# Patient Record
Sex: MALE | Race: WHITE | NOT HISPANIC OR LATINO | URBAN - METROPOLITAN AREA
[De-identification: names, ages, dates, MRNs, and addresses within clinical notes are randomized per-mention and may not be internally consistent; named-entity substitution may affect disease eponyms.]

---

## 2017-02-07 ENCOUNTER — OUTPATIENT (OUTPATIENT)
Dept: OUTPATIENT SERVICES | Facility: HOSPITAL | Age: 62
LOS: 1 days | End: 2017-02-07

## 2017-02-17 DIAGNOSIS — Z01.20 ENCOUNTER FOR DENTAL EXAMINATION AND CLEANING WITHOUT ABNORMAL FINDINGS: ICD-10-CM

## 2017-03-23 ENCOUNTER — NON-APPOINTMENT (OUTPATIENT)
Age: 62
End: 2017-03-23

## 2017-03-23 ENCOUNTER — APPOINTMENT (OUTPATIENT)
Dept: CARDIOLOGY | Facility: CLINIC | Age: 62
End: 2017-03-23

## 2017-03-23 VITALS — HEART RATE: 77 BPM | SYSTOLIC BLOOD PRESSURE: 106 MMHG | DIASTOLIC BLOOD PRESSURE: 70 MMHG | OXYGEN SATURATION: 97 %

## 2017-08-08 ENCOUNTER — OUTPATIENT (OUTPATIENT)
Dept: OUTPATIENT SERVICES | Facility: HOSPITAL | Age: 62
LOS: 1 days | End: 2017-08-08

## 2017-08-16 DIAGNOSIS — Z01.20 ENCOUNTER FOR DENTAL EXAMINATION AND CLEANING WITHOUT ABNORMAL FINDINGS: ICD-10-CM

## 2017-09-21 ENCOUNTER — NON-APPOINTMENT (OUTPATIENT)
Age: 62
End: 2017-09-21

## 2017-09-21 ENCOUNTER — APPOINTMENT (OUTPATIENT)
Dept: CARDIOLOGY | Facility: CLINIC | Age: 62
End: 2017-09-21
Payer: MEDICARE

## 2017-09-21 VITALS — OXYGEN SATURATION: 99 % | HEART RATE: 61 BPM | DIASTOLIC BLOOD PRESSURE: 66 MMHG | SYSTOLIC BLOOD PRESSURE: 110 MMHG

## 2017-09-21 VITALS — HEIGHT: 69 IN | BODY MASS INDEX: 27.55 KG/M2 | WEIGHT: 186 LBS

## 2017-09-21 PROCEDURE — 99214 OFFICE O/P EST MOD 30 MIN: CPT | Mod: 25

## 2017-09-21 PROCEDURE — 93000 ELECTROCARDIOGRAM COMPLETE: CPT

## 2017-10-03 ENCOUNTER — OUTPATIENT (OUTPATIENT)
Dept: OUTPATIENT SERVICES | Facility: HOSPITAL | Age: 62
LOS: 1 days | End: 2017-10-03

## 2017-10-04 DIAGNOSIS — Z01.20 ENCOUNTER FOR DENTAL EXAMINATION AND CLEANING WITHOUT ABNORMAL FINDINGS: ICD-10-CM

## 2018-03-22 ENCOUNTER — APPOINTMENT (OUTPATIENT)
Dept: CARDIOLOGY | Facility: CLINIC | Age: 63
End: 2018-03-22

## 2018-04-06 ENCOUNTER — APPOINTMENT (OUTPATIENT)
Dept: CARDIOLOGY | Facility: CLINIC | Age: 63
End: 2018-04-06
Payer: MEDICARE

## 2018-04-06 ENCOUNTER — NON-APPOINTMENT (OUTPATIENT)
Age: 63
End: 2018-04-06

## 2018-04-06 VITALS
HEART RATE: 75 BPM | DIASTOLIC BLOOD PRESSURE: 75 MMHG | BODY MASS INDEX: 27.7 KG/M2 | WEIGHT: 187 LBS | SYSTOLIC BLOOD PRESSURE: 118 MMHG | HEIGHT: 69 IN | OXYGEN SATURATION: 97 %

## 2018-04-06 DIAGNOSIS — R73.01 IMPAIRED FASTING GLUCOSE: ICD-10-CM

## 2018-04-06 PROCEDURE — 99214 OFFICE O/P EST MOD 30 MIN: CPT | Mod: 25

## 2018-04-06 PROCEDURE — 93000 ELECTROCARDIOGRAM COMPLETE: CPT

## 2018-05-22 ENCOUNTER — OUTPATIENT (OUTPATIENT)
Dept: OUTPATIENT SERVICES | Facility: HOSPITAL | Age: 63
LOS: 1 days | End: 2018-05-22

## 2018-05-24 DIAGNOSIS — Z01.20 ENCOUNTER FOR DENTAL EXAMINATION AND CLEANING WITHOUT ABNORMAL FINDINGS: ICD-10-CM

## 2018-07-16 ENCOUNTER — INPATIENT (INPATIENT)
Facility: HOSPITAL | Age: 63
LOS: 2 days | Discharge: HOME | End: 2018-07-19
Attending: ORTHOPAEDIC SURGERY | Admitting: ORTHOPAEDIC SURGERY
Payer: MEDICARE

## 2018-07-16 VITALS
HEART RATE: 83 BPM | SYSTOLIC BLOOD PRESSURE: 127 MMHG | RESPIRATION RATE: 16 BRPM | TEMPERATURE: 98 F | WEIGHT: 169.98 LBS | HEIGHT: 70 IN | DIASTOLIC BLOOD PRESSURE: 96 MMHG | OXYGEN SATURATION: 96 %

## 2018-07-16 DIAGNOSIS — F31.9 BIPOLAR DISORDER, UNSPECIFIED: ICD-10-CM

## 2018-07-16 DIAGNOSIS — N40.1 BENIGN PROSTATIC HYPERPLASIA WITH LOWER URINARY TRACT SYMPTOMS: ICD-10-CM

## 2018-07-16 DIAGNOSIS — I10 ESSENTIAL (PRIMARY) HYPERTENSION: ICD-10-CM

## 2018-07-16 DIAGNOSIS — S82.892A OTHER FRACTURE OF LEFT LOWER LEG, INITIAL ENCOUNTER FOR CLOSED FRACTURE: ICD-10-CM

## 2018-07-16 DIAGNOSIS — E78.5 HYPERLIPIDEMIA, UNSPECIFIED: ICD-10-CM

## 2018-07-16 DIAGNOSIS — K21.9 GASTRO-ESOPHAGEAL REFLUX DISEASE WITHOUT ESOPHAGITIS: ICD-10-CM

## 2018-07-16 LAB
ABO RH CONFIRMATION: SIGNIFICANT CHANGE UP
ALBUMIN SERPL ELPH-MCNC: 3.9 G/DL — SIGNIFICANT CHANGE UP (ref 3.3–5)
ALP SERPL-CCNC: 116 U/L — SIGNIFICANT CHANGE UP (ref 40–120)
ALT FLD-CCNC: 25 U/L — SIGNIFICANT CHANGE UP (ref 12–78)
ANION GAP SERPL CALC-SCNC: 6 MMOL/L — SIGNIFICANT CHANGE UP (ref 5–17)
APTT BLD: 34.1 SEC — SIGNIFICANT CHANGE UP (ref 27.5–37.4)
AST SERPL-CCNC: 23 U/L — SIGNIFICANT CHANGE UP (ref 15–37)
BASOPHILS # BLD AUTO: 0.04 K/UL — SIGNIFICANT CHANGE UP (ref 0–0.2)
BASOPHILS NFR BLD AUTO: 0.4 % — SIGNIFICANT CHANGE UP (ref 0–2)
BILIRUB SERPL-MCNC: 0.4 MG/DL — SIGNIFICANT CHANGE UP (ref 0.2–1.2)
BLD GP AB SCN SERPL QL: SIGNIFICANT CHANGE UP
BUN SERPL-MCNC: 15 MG/DL — SIGNIFICANT CHANGE UP (ref 7–23)
CALCIUM SERPL-MCNC: 9.7 MG/DL — SIGNIFICANT CHANGE UP (ref 8.5–10.1)
CHLORIDE SERPL-SCNC: 106 MMOL/L — SIGNIFICANT CHANGE UP (ref 96–108)
CK SERPL-CCNC: 76 U/L — SIGNIFICANT CHANGE UP (ref 26–308)
CO2 SERPL-SCNC: 27 MMOL/L — SIGNIFICANT CHANGE UP (ref 22–31)
CREAT SERPL-MCNC: 0.84 MG/DL — SIGNIFICANT CHANGE UP (ref 0.5–1.3)
EOSINOPHIL # BLD AUTO: 0.2 K/UL — SIGNIFICANT CHANGE UP (ref 0–0.5)
EOSINOPHIL NFR BLD AUTO: 2.2 % — SIGNIFICANT CHANGE UP (ref 0–6)
GLUCOSE SERPL-MCNC: 111 MG/DL — HIGH (ref 70–99)
HCT VFR BLD CALC: 40.1 % — SIGNIFICANT CHANGE UP (ref 39–50)
HGB BLD-MCNC: 14.2 G/DL — SIGNIFICANT CHANGE UP (ref 13–17)
IMM GRANULOCYTES NFR BLD AUTO: 0.2 % — SIGNIFICANT CHANGE UP (ref 0–1.5)
INR BLD: 1.15 RATIO — SIGNIFICANT CHANGE UP (ref 0.88–1.16)
LYMPHOCYTES # BLD AUTO: 2.29 K/UL — SIGNIFICANT CHANGE UP (ref 1–3.3)
LYMPHOCYTES # BLD AUTO: 25.5 % — SIGNIFICANT CHANGE UP (ref 13–44)
MCHC RBC-ENTMCNC: 30.8 PG — SIGNIFICANT CHANGE UP (ref 27–34)
MCHC RBC-ENTMCNC: 35.4 GM/DL — SIGNIFICANT CHANGE UP (ref 32–36)
MCV RBC AUTO: 87 FL — SIGNIFICANT CHANGE UP (ref 80–100)
MONOCYTES # BLD AUTO: 0.61 K/UL — SIGNIFICANT CHANGE UP (ref 0–0.9)
MONOCYTES NFR BLD AUTO: 6.8 % — SIGNIFICANT CHANGE UP (ref 2–14)
NEUTROPHILS # BLD AUTO: 5.81 K/UL — SIGNIFICANT CHANGE UP (ref 1.8–7.4)
NEUTROPHILS NFR BLD AUTO: 64.9 % — SIGNIFICANT CHANGE UP (ref 43–77)
NRBC # BLD: 0 /100 WBCS — SIGNIFICANT CHANGE UP (ref 0–0)
PLATELET # BLD AUTO: 221 K/UL — SIGNIFICANT CHANGE UP (ref 150–400)
POTASSIUM SERPL-MCNC: 3.9 MMOL/L — SIGNIFICANT CHANGE UP (ref 3.5–5.3)
POTASSIUM SERPL-SCNC: 3.9 MMOL/L — SIGNIFICANT CHANGE UP (ref 3.5–5.3)
PROT SERPL-MCNC: 7.7 GM/DL — SIGNIFICANT CHANGE UP (ref 6–8.3)
PROTHROM AB SERPL-ACNC: 12.5 SEC — SIGNIFICANT CHANGE UP (ref 9.8–12.7)
RBC # BLD: 4.61 M/UL — SIGNIFICANT CHANGE UP (ref 4.2–5.8)
RBC # FLD: 11.5 % — SIGNIFICANT CHANGE UP (ref 10.3–14.5)
SODIUM SERPL-SCNC: 139 MMOL/L — SIGNIFICANT CHANGE UP (ref 135–145)
TROPONIN I SERPL-MCNC: <0.015 NG/ML — SIGNIFICANT CHANGE UP (ref 0.01–0.04)
TYPE + AB SCN PNL BLD: SIGNIFICANT CHANGE UP
WBC # BLD: 8.97 K/UL — SIGNIFICANT CHANGE UP (ref 3.8–10.5)
WBC # FLD AUTO: 8.97 K/UL — SIGNIFICANT CHANGE UP (ref 3.8–10.5)

## 2018-07-16 PROCEDURE — 73700 CT LOWER EXTREMITY W/O DYE: CPT | Mod: 26,LT

## 2018-07-16 PROCEDURE — 70450 CT HEAD/BRAIN W/O DYE: CPT | Mod: 26

## 2018-07-16 PROCEDURE — 76377 3D RENDER W/INTRP POSTPROCES: CPT | Mod: 26

## 2018-07-16 PROCEDURE — 99285 EMERGENCY DEPT VISIT HI MDM: CPT

## 2018-07-16 PROCEDURE — 71045 X-RAY EXAM CHEST 1 VIEW: CPT | Mod: 26

## 2018-07-16 PROCEDURE — 72125 CT NECK SPINE W/O DYE: CPT | Mod: 26

## 2018-07-16 PROCEDURE — 93010 ELECTROCARDIOGRAM REPORT: CPT

## 2018-07-16 PROCEDURE — 73610 X-RAY EXAM OF ANKLE: CPT | Mod: 26,76,LT

## 2018-07-16 PROCEDURE — 73590 X-RAY EXAM OF LOWER LEG: CPT | Mod: 26,LT

## 2018-07-16 RX ORDER — HEPARIN SODIUM 5000 [USP'U]/ML
5000 INJECTION INTRAVENOUS; SUBCUTANEOUS ONCE
Qty: 0 | Refills: 0 | Status: DISCONTINUED | OUTPATIENT
Start: 2018-07-16 | End: 2018-07-16

## 2018-07-16 RX ORDER — SODIUM CHLORIDE 9 MG/ML
1000 INJECTION INTRAMUSCULAR; INTRAVENOUS; SUBCUTANEOUS
Qty: 0 | Refills: 0 | Status: COMPLETED | OUTPATIENT
Start: 2018-07-16 | End: 2018-07-16

## 2018-07-16 RX ORDER — MORPHINE SULFATE 50 MG/1
4 CAPSULE, EXTENDED RELEASE ORAL ONCE
Qty: 0 | Refills: 0 | Status: DISCONTINUED | OUTPATIENT
Start: 2018-07-16 | End: 2018-07-16

## 2018-07-16 RX ORDER — SODIUM CHLORIDE 9 MG/ML
3 INJECTION INTRAMUSCULAR; INTRAVENOUS; SUBCUTANEOUS ONCE
Qty: 0 | Refills: 0 | Status: COMPLETED | OUTPATIENT
Start: 2018-07-16 | End: 2018-07-16

## 2018-07-16 RX ORDER — SODIUM CHLORIDE 9 MG/ML
1000 INJECTION, SOLUTION INTRAVENOUS
Qty: 0 | Refills: 0 | Status: DISCONTINUED | OUTPATIENT
Start: 2018-07-16 | End: 2018-07-17

## 2018-07-16 RX ADMIN — SODIUM CHLORIDE 3 MILLILITER(S): 9 INJECTION INTRAMUSCULAR; INTRAVENOUS; SUBCUTANEOUS at 07:34

## 2018-07-16 RX ADMIN — MORPHINE SULFATE 4 MILLIGRAM(S): 50 CAPSULE, EXTENDED RELEASE ORAL at 08:35

## 2018-07-16 RX ADMIN — MORPHINE SULFATE 4 MILLIGRAM(S): 50 CAPSULE, EXTENDED RELEASE ORAL at 08:50

## 2018-07-16 RX ADMIN — SODIUM CHLORIDE 75 MILLILITER(S): 9 INJECTION, SOLUTION INTRAVENOUS at 16:30

## 2018-07-16 RX ADMIN — SODIUM CHLORIDE 125 MILLILITER(S): 9 INJECTION INTRAMUSCULAR; INTRAVENOUS; SUBCUTANEOUS at 11:38

## 2018-07-16 NOTE — ED PROVIDER NOTE - OBJECTIVE STATEMENT
62 y/o male in ED from group home found at bottom of stairs unwitnessed fall.   pt with left ankle deformity.   pt A&Ox0.   no other visible trauma.  no v/d noted as per group staff.

## 2018-07-16 NOTE — H&P ADULT - PROBLEM SELECTOR PLAN 2
BP stable  Continue valsartan-HCTZ. Hold for SBP <90mmHg BP stable  Continue valsartan-HCTZ. Hold for SBP <90mmHg  Continue ppx aspirin

## 2018-07-16 NOTE — H&P ADULT - HISTORY OF PRESENT ILLNESS
Pt is a 64 yo male with pertinent h/o severe Intellectual Disability, HTN, presenting with LLE deformity following a fall at group home. Per pt's aide who is present with pt, the fall was unwitnessed, pt was found lying at the bottom of the staircase, unknown if there was any LOC. No known previous falls in the past. Per aide, pt is independent at the group, walks unassisted with any device. Denies any recent illness or change in mental status. Pt is reported to be at his normal baseline.    In the ED, pt is afebrile, normotensive (BP of 119/69), XR of the Left ankle showed displaced fracture of the medial malleolus with lateral tibiotalar dislocation. Orthopedics was consulted with ORIF in the OR planned for the AM. Pt received x1 dose of morphine for pain    CBC and CMP were also obtained and wnl. CT head with no evidence of intracranial bleed or infarct, CT cervical spine with no evidence of fracture or dislocation. Pt is a 64 yo, nonverbal, male with pertinent h/o severe Intellectual Disability, HTN, presenting with LLE deformity following a fall at group home. Per pt's aide who is present with pt, the fall was unwitnessed, pt was found lying at the bottom of ~8 steps staircase, unknown if there was any LOC. No known previous falls in the past. Per aide, pt is independent at the group home, walks unassisted with any device. Staff denies any recent illness or change in mental status. Pt is reported to be at his normal baseline.    In the ED, pt is afebrile, normotensive (BP of 119/69), XR of the Left ankle showed displaced fracture of the medial malleolus with lateral tibiotalar dislocation. Orthopedics was consulted with ORIF in the OR planned for the AM. Pt received x1 dose of morphine for pain    CBC and CMP were also obtained and wnl. CT head with no evidence of intracranial bleed or infarct, CT cervical spine with no evidence of fracture or dislocation.

## 2018-07-16 NOTE — ED PROVIDER NOTE - CARE PLAN
Principal Discharge DX:	Closed fracture of left ankle, initial encounter  Secondary Diagnosis:	Fall, initial encounter

## 2018-07-16 NOTE — CHART NOTE - NSCHARTNOTEFT_GEN_A_CORE
Pt seen and examined at bedside  LLE elevated  Swelling increased from prior exam  Compartments soft and compressible  Cap refill brisk, toes appear well perfused

## 2018-07-16 NOTE — H&P ADULT - EXTREMITIES COMMENTS
LLE in ACE wrap from mid tibial to midfoot, no pedal edema, toes warm  with good capillary refill, RLE intact

## 2018-07-16 NOTE — ED PROVIDER NOTE - MUSCULOSKELETAL MINIMAL EXAM
visible deformity of left ankle.  pulses present  via doppler.  pt moving left foot/RANGE OF MOTION LIMITED

## 2018-07-16 NOTE — ED ADULT NURSE NOTE - PMH
Mental retardation Aggressiveness    Agitation    Autism    Bipolar affective disorder    BPH (benign prostatic hypertrophy) with urinary obstruction    GERD (gastroesophageal reflux disease)    Hematuria    Hyperlipidemia    Hypertension    Mental retardation    Nonverbal    Schizophrenia, paranoid type

## 2018-07-16 NOTE — ED ADULT NURSE REASSESSMENT NOTE - NS ED NURSE REASSESS COMMENT FT1
received report from kallie albarran, pt has movement to all LLE toes wrapped in cast, aide at bedside.

## 2018-07-16 NOTE — H&P ADULT - NSHPPHYSICALEXAM_GEN_ALL_CORE
Vital Signs Last 24 Hrs  T(C): 37 (16 Jul 2018 17:46), Max: 37 (16 Jul 2018 17:46)  T(F): 98.6 (16 Jul 2018 17:46), Max: 98.6 (16 Jul 2018 17:46)  HR: 70 (16 Jul 2018 17:46) (70 - 85)  BP: 119/69 (16 Jul 2018 17:46) (119/69 - 127/96)  BP(mean): --  RR: 20 (16 Jul 2018 17:46) (15 - 20)  SpO2: 98% (16 Jul 2018 17:46) (96% - 98%)

## 2018-07-16 NOTE — H&P ADULT - NSHPLABSRESULTS_GEN_ALL_CORE
XR of Left ankle:  "Markedly displaced fracture of the medial malleolus   with severe lateral displacement of the distal fracture fragment. There   is an additional fracture fragment of the distal tibia likely   representing a posterior malleolar fracture. There is lateral tibiotalar dislocation"    CT head: no intracranial hemorrhage    CT cervical spine: no fracture or dislocation XR of Left ankle:  "Markedly displaced fracture of the medial malleolus   with severe lateral displacement of the distal fracture fragment. There   is an additional fracture fragment of the distal tibia likely   representing a posterior malleolar fracture. There is lateral tibiotalar dislocation"    CT head: no intracranial hemorrhage    CT cervical spine: no fracture or dislocation    EKG: normal sinus rhythm

## 2018-07-16 NOTE — H&P ADULT - ASSESSMENT
64yo M with L trimalleolar fracture secondary to fall, with cause of fall unknown however considering possible syncope. Pt is currently at normal baseline behavior.    Admit to Med Surg 62yo M with L trimalleolar fracture secondary to unwitnessed fall down 8 steps staircase. Pt is currently at normal baseline behavior. Pt observed in the emergency room for ~12-16hrs and has been hemodynamically stable, troponin x1 negative          Admit to Med Surg 64yo M with L trimalleolar fracture secondary to unwitnessed fall down 8 steps staircase. Pt is currently at normal baseline behavior. Pt observed in the emergency room for ~12-16hrs and has been hemodynamically stable, troponin x1 negative    Admit to Med Surg

## 2018-07-16 NOTE — H&P ADULT - PMH
Aggressiveness    Agitation    Autism    Bipolar affective disorder    BPH (benign prostatic hypertrophy) with urinary obstruction    GERD (gastroesophageal reflux disease)    Hematuria    Hyperlipidemia    Hypertension    Mental retardation    Nonverbal    Schizophrenia, paranoid type

## 2018-07-16 NOTE — CONSULT NOTE ADULT - SUBJECTIVE AND OBJECTIVE BOX
Patient is a 63M with severe MR who presents to the ED today for a c/o of L Ankle Pain and visible deformity. Patient's aid states that he was walking down a flight of stairs when he tripped and fell. Patient was found at the bottom of the stairs following his fall. Patient ambulates in group home without assistive devices. Patient is a poor historian secondary to mental retardation. Unable to assess full review of systems secondary to mental status. No known orthopedic history per aid and documents available from group home. No other orthopedic concerns at this time       PAST MEDICAL & SURGICAL HISTORY:  Mental retardation  HTN  HLD  Autism  Impulse control disorder  Schizophrenia  GERD  BPH  Mild Osteopenia    Home Medications:  Fluticasone  Risperidone  Tamulosin  Pravastatin  Fluvoxamine  Niacin  Valsartan  Loratadine  Calcium  ASA 81mg    Allergies  No Known Allergies    PHYSICAL EXAM:  Vital Signs Last 24 Hrs  T(C): 36.7 (16 Jul 2018 07:15), Max: 36.7 (16 Jul 2018 07:15)  T(F): 98 (16 Jul 2018 07:15), Max: 98 (16 Jul 2018 07:15)  HR: 83 (16 Jul 2018 07:15) (83 - 83)  BP: 127/96 (16 Jul 2018 07:15) (127/96 - 127/96)  RR: 16 (16 Jul 2018 07:15) (16 - 16)  SpO2: 96% (16 Jul 2018 07:15) (96% - 96%)    Gen: NAD; Resting comfortably  LLE: Gross deformity of ankle noted; 2cm ecchymosis over medial malleolus  Skin intact with tenting over medial malleolus; No erythema  SILT L3-S1  EHL/FHL/TA/GS intact  Full AROM Knee and Hip  -Log roll  +DP (doppler); No palpable DP/PT pulse; Foot cold to palpation  Compartments soft and compressible   No calf tenderness    Secondary Survey: No TTP over bony prominences, SILT, palpable pulses, full/painless range of motion, compartments soft     Imaging:  XR L Ankle: Closed Trimalleolar Ankle fracture/ dislocation with Lateral Talar displacement    Procedure: Closed Reduction of L Ankle  Patient was NVI preprocedure and postprocedure. Patient tolerated procedure well with no complications. Patient was placed in a well padded Trilam splint and post-reduction films were obtained which showed adequate alignment.

## 2018-07-16 NOTE — ED ADULT TRIAGE NOTE - CHIEF COMPLAINT QUOTE
Pt presents to the ED after being found at the bottom of the stairs at an adult care home, CDD. Pt nonverbal at baseline. Pt has pain and deformity noted to the left ankle. Trauma alert called.

## 2018-07-16 NOTE — H&P ADULT - NSHPSOCIALHISTORY_GEN_ALL_CORE
Lives at [facility] group home, no h/o alcohol use or tobacco use Lives at [facility] group home, no h/o alcohol use or tobacco use

## 2018-07-16 NOTE — H&P ADULT - ATTENDING COMMENTS
Pt seen and examined with PGY 1, Family Medicine , Dr. Bertha Fisher.    Presentation, diagnostic details and assessment and plan discussed and reviewed in detail.    Agree with plan of care.     Pt is a 64 yo, nonverbal, male with pertinent h/o severe Intellectual Disability, HTN, presenting with LLE injury  after a fall down a staircase.     Pt is admitted with a  left trimaleolar fracture.      - pt received 1 dose of heparin for DVT prophylaxis in the ED  - cont pain control   - NPO after midnight  - for surgical repair in AM  - cont usual meds  - pt can be cleared for surgery

## 2018-07-16 NOTE — H&P ADULT - PROBLEM SELECTOR PLAN 1
OR in the AM for ORIF  NPO after midnight  Leg in ACE wrap  Keep LLE elevated and non-weight bearing  Morphine PRN for pain  Heparin SubQ for ppx - hold for OR    RCRI score of 0.4%- pt at low risk for surgical intervention OR in the AM for ORIF  NPO after midnight  LR IVF while NPO  Leg in ACE wrap  Keep LLE elevated and non-weight bearing  Morphine PRN for pain  Heparin SubQ for ppx - hold for OR    RCRI score of 0.4%- pt at low risk for surgical intervention OR in the AM for ORIF  NPO after midnight  LR IVF while NPO  Leg in ACE wrap  Keep LLE elevated and non-weight bearing  Morphine PRN for pain      RCRI score of 0.4%- pt at low risk for surgical intervention OR in the AM for ORIF  NPO after midnight  LR IVF while NPO  Leg in ACE wrap      Keep LLE elevated and non-weight bearing  Morphine PRN for pain      RCRI score of 0.4%- pt at low risk for surgical intervention  Pt can be cleared for surgery at this time

## 2018-07-16 NOTE — ED PROVIDER NOTE - PROGRESS NOTE DETAILS
Attending RAMSEY Paredes/riaz Ulrich and dr. Llanos at bedside.  Reviewed with pt and aides results of xrays. Attending Serg Paredes informs that Dr. Reardon will take pt to his service. Attending josephine Paredes/riaz Howard for admission Attending Paredes, no bruising seen on chest, abd, or back.  Nontender to palpation torso

## 2018-07-16 NOTE — ED PROVIDER NOTE - MEDICAL DECISION MAKING DETAILS
pt s/p fall down flight of stairs unwitnessed with visible left ankle deformity.   will w/u and admit for possible syncope.  ortho consult

## 2018-07-16 NOTE — CHART NOTE - NSCHARTNOTEFT_GEN_A_CORE
Trauma alert  Pt seen and examined at bedside  LLE without palpable DP pulse but detectable by doppler  Pt closed reduced and placed in well padded splint  Pt NVI post-procedure  Post-reduction films reveal adequate anatomic alignment  Ice and elevate  Formal consult note to follow

## 2018-07-16 NOTE — H&P ADULT - MUSCULOSKELETAL
detailed exam ROM intact/LLE in ACE wrap from mid tibial to midfoot, no pedal edema, toes warm  with good capillary refill, RLE intact

## 2018-07-17 ENCOUNTER — TRANSCRIPTION ENCOUNTER (OUTPATIENT)
Age: 63
End: 2018-07-17

## 2018-07-17 DIAGNOSIS — Z29.9 ENCOUNTER FOR PROPHYLACTIC MEASURES, UNSPECIFIED: ICD-10-CM

## 2018-07-17 LAB
ANION GAP SERPL CALC-SCNC: 8 MMOL/L — SIGNIFICANT CHANGE UP (ref 5–17)
APPEARANCE UR: ABNORMAL
APTT BLD: 35.1 SEC — SIGNIFICANT CHANGE UP (ref 27.5–37.4)
BACTERIA # UR AUTO: ABNORMAL
BILIRUB UR-MCNC: NEGATIVE — SIGNIFICANT CHANGE UP
BUN SERPL-MCNC: 17 MG/DL — SIGNIFICANT CHANGE UP (ref 7–23)
CALCIUM SERPL-MCNC: 9 MG/DL — SIGNIFICANT CHANGE UP (ref 8.5–10.1)
CHLORIDE SERPL-SCNC: 109 MMOL/L — HIGH (ref 96–108)
CO2 SERPL-SCNC: 26 MMOL/L — SIGNIFICANT CHANGE UP (ref 22–31)
COLOR SPEC: YELLOW — SIGNIFICANT CHANGE UP
COMMENT - URINE: SIGNIFICANT CHANGE UP
CREAT SERPL-MCNC: 0.67 MG/DL — SIGNIFICANT CHANGE UP (ref 0.5–1.3)
DIFF PNL FLD: NEGATIVE — SIGNIFICANT CHANGE UP
GLUCOSE SERPL-MCNC: 112 MG/DL — HIGH (ref 70–99)
GLUCOSE UR QL: NEGATIVE MG/DL — SIGNIFICANT CHANGE UP
HCT VFR BLD CALC: 35.5 % — LOW (ref 39–50)
HGB BLD-MCNC: 12.4 G/DL — LOW (ref 13–17)
INR BLD: 1.26 RATIO — HIGH (ref 0.88–1.16)
KETONES UR-MCNC: ABNORMAL
LEUKOCYTE ESTERASE UR-ACNC: ABNORMAL
MCHC RBC-ENTMCNC: 30 PG — SIGNIFICANT CHANGE UP (ref 27–34)
MCHC RBC-ENTMCNC: 34.9 GM/DL — SIGNIFICANT CHANGE UP (ref 32–36)
MCV RBC AUTO: 86 FL — SIGNIFICANT CHANGE UP (ref 80–100)
NITRITE UR-MCNC: NEGATIVE — SIGNIFICANT CHANGE UP
NRBC # BLD: 0 /100 WBCS — SIGNIFICANT CHANGE UP (ref 0–0)
PH UR: 6.5 — SIGNIFICANT CHANGE UP (ref 5–8)
PLATELET # BLD AUTO: 230 K/UL — SIGNIFICANT CHANGE UP (ref 150–400)
POTASSIUM SERPL-MCNC: 3.6 MMOL/L — SIGNIFICANT CHANGE UP (ref 3.5–5.3)
POTASSIUM SERPL-SCNC: 3.6 MMOL/L — SIGNIFICANT CHANGE UP (ref 3.5–5.3)
PROT UR-MCNC: 15 MG/DL
PROTHROM AB SERPL-ACNC: 13.7 SEC — HIGH (ref 9.8–12.7)
RBC # BLD: 4.13 M/UL — LOW (ref 4.2–5.8)
RBC # FLD: 11.6 % — SIGNIFICANT CHANGE UP (ref 10.3–14.5)
RBC CASTS # UR COMP ASSIST: SIGNIFICANT CHANGE UP /HPF (ref 0–4)
SODIUM SERPL-SCNC: 143 MMOL/L — SIGNIFICANT CHANGE UP (ref 135–145)
SP GR SPEC: 1.01 — SIGNIFICANT CHANGE UP (ref 1.01–1.02)
UROBILINOGEN FLD QL: 1 MG/DL
WBC # BLD: 11.64 K/UL — HIGH (ref 3.8–10.5)
WBC # FLD AUTO: 11.64 K/UL — HIGH (ref 3.8–10.5)
WBC UR QL: ABNORMAL

## 2018-07-17 PROCEDURE — 27823 TREATMENT OF ANKLE FRACTURE: CPT | Mod: LT

## 2018-07-17 PROCEDURE — 99024 POSTOP FOLLOW-UP VISIT: CPT

## 2018-07-17 PROCEDURE — 99223 1ST HOSP IP/OBS HIGH 75: CPT | Mod: 57

## 2018-07-17 PROCEDURE — 76000 FLUOROSCOPY <1 HR PHYS/QHP: CPT | Mod: 26

## 2018-07-17 RX ORDER — FLUVOXAMINE MALEATE 25 MG/1
25 TABLET ORAL
Qty: 0 | Refills: 0 | Status: DISCONTINUED | OUTPATIENT
Start: 2018-07-17 | End: 2018-07-19

## 2018-07-17 RX ORDER — FLUTICASONE PROPIONATE 50 MCG
1 SPRAY, SUSPENSION NASAL
Qty: 0 | Refills: 0 | Status: DISCONTINUED | OUTPATIENT
Start: 2018-07-16 | End: 2018-07-17

## 2018-07-17 RX ORDER — VALSARTAN 80 MG/1
80 TABLET ORAL DAILY
Qty: 0 | Refills: 0 | Status: DISCONTINUED | OUTPATIENT
Start: 2018-07-17 | End: 2018-07-19

## 2018-07-17 RX ORDER — LIDOCAINE 4 G/100G
1 CREAM TOPICAL AT BEDTIME
Qty: 0 | Refills: 0 | Status: DISCONTINUED | OUTPATIENT
Start: 2018-07-17 | End: 2018-07-19

## 2018-07-17 RX ORDER — CHLORHEXIDINE GLUCONATE 213 G/1000ML
15 SOLUTION TOPICAL
Qty: 0 | Refills: 0 | Status: DISCONTINUED | OUTPATIENT
Start: 2018-07-17 | End: 2018-07-17

## 2018-07-17 RX ORDER — ONDANSETRON 8 MG/1
4 TABLET, FILM COATED ORAL
Qty: 0 | Refills: 0 | Status: DISCONTINUED | OUTPATIENT
Start: 2018-07-17 | End: 2018-07-17

## 2018-07-17 RX ORDER — RISPERIDONE 4 MG/1
2 TABLET ORAL
Qty: 0 | Refills: 0 | Status: DISCONTINUED | OUTPATIENT
Start: 2018-07-16 | End: 2018-07-17

## 2018-07-17 RX ORDER — SODIUM CHLORIDE 9 MG/ML
3 INJECTION INTRAMUSCULAR; INTRAVENOUS; SUBCUTANEOUS ONCE
Qty: 0 | Refills: 0 | Status: COMPLETED | OUTPATIENT
Start: 2018-07-17 | End: 2018-07-17

## 2018-07-17 RX ORDER — HYDROCHLOROTHIAZIDE 25 MG
12.5 TABLET ORAL DAILY
Qty: 0 | Refills: 0 | Status: DISCONTINUED | OUTPATIENT
Start: 2018-07-17 | End: 2018-07-17

## 2018-07-17 RX ORDER — CHLORHEXIDINE GLUCONATE 213 G/1000ML
15 SOLUTION TOPICAL
Qty: 0 | Refills: 0 | Status: DISCONTINUED | OUTPATIENT
Start: 2018-07-17 | End: 2018-07-19

## 2018-07-17 RX ORDER — ATORVASTATIN CALCIUM 80 MG/1
10 TABLET, FILM COATED ORAL AT BEDTIME
Qty: 0 | Refills: 0 | Status: DISCONTINUED | OUTPATIENT
Start: 2018-07-17 | End: 2018-07-17

## 2018-07-17 RX ORDER — MORPHINE SULFATE 50 MG/1
4 CAPSULE, EXTENDED RELEASE ORAL ONCE
Qty: 0 | Refills: 0 | Status: DISCONTINUED | OUTPATIENT
Start: 2018-07-17 | End: 2018-07-17

## 2018-07-17 RX ORDER — CEFAZOLIN SODIUM 1 G
2000 VIAL (EA) INJECTION EVERY 8 HOURS
Qty: 0 | Refills: 0 | Status: COMPLETED | OUTPATIENT
Start: 2018-07-17 | End: 2018-07-18

## 2018-07-17 RX ORDER — DOCUSATE SODIUM 100 MG
100 CAPSULE ORAL DAILY
Qty: 0 | Refills: 0 | Status: DISCONTINUED | OUTPATIENT
Start: 2018-07-17 | End: 2018-07-19

## 2018-07-17 RX ORDER — NIACIN 50 MG
1000 TABLET ORAL AT BEDTIME
Qty: 0 | Refills: 0 | Status: DISCONTINUED | OUTPATIENT
Start: 2018-07-17 | End: 2018-07-17

## 2018-07-17 RX ORDER — LIDOCAINE 4 G/100G
1 CREAM TOPICAL AT BEDTIME
Qty: 0 | Refills: 0 | Status: DISCONTINUED | OUTPATIENT
Start: 2018-07-17 | End: 2018-07-17

## 2018-07-17 RX ORDER — ACETAMINOPHEN 500 MG
650 TABLET ORAL EVERY 6 HOURS
Qty: 0 | Refills: 0 | Status: DISCONTINUED | OUTPATIENT
Start: 2018-07-17 | End: 2018-07-19

## 2018-07-17 RX ORDER — DOCUSATE SODIUM 100 MG
100 CAPSULE ORAL DAILY
Qty: 0 | Refills: 0 | Status: DISCONTINUED | OUTPATIENT
Start: 2018-07-17 | End: 2018-07-17

## 2018-07-17 RX ORDER — HEPARIN SODIUM 5000 [USP'U]/ML
5000 INJECTION INTRAVENOUS; SUBCUTANEOUS EVERY 8 HOURS
Qty: 0 | Refills: 0 | Status: DISCONTINUED | OUTPATIENT
Start: 2018-07-17 | End: 2018-07-18

## 2018-07-17 RX ORDER — TAMSULOSIN HYDROCHLORIDE 0.4 MG/1
0.4 CAPSULE ORAL AT BEDTIME
Qty: 0 | Refills: 0 | Status: DISCONTINUED | OUTPATIENT
Start: 2018-07-17 | End: 2018-07-17

## 2018-07-17 RX ORDER — FLUTICASONE PROPIONATE 50 MCG
1 SPRAY, SUSPENSION NASAL
Qty: 0 | Refills: 0 | Status: DISCONTINUED | OUTPATIENT
Start: 2018-07-17 | End: 2018-07-19

## 2018-07-17 RX ORDER — MORPHINE SULFATE 50 MG/1
4 CAPSULE, EXTENDED RELEASE ORAL
Qty: 0 | Refills: 0 | Status: DISCONTINUED | OUTPATIENT
Start: 2018-07-17 | End: 2018-07-17

## 2018-07-17 RX ORDER — ONDANSETRON 8 MG/1
4 TABLET, FILM COATED ORAL ONCE
Qty: 0 | Refills: 0 | Status: DISCONTINUED | OUTPATIENT
Start: 2018-07-17 | End: 2018-07-17

## 2018-07-17 RX ORDER — LORATADINE 10 MG/1
10 TABLET ORAL DAILY
Qty: 0 | Refills: 0 | Status: DISCONTINUED | OUTPATIENT
Start: 2018-07-17 | End: 2018-07-17

## 2018-07-17 RX ORDER — SODIUM CHLORIDE 9 MG/ML
1000 INJECTION, SOLUTION INTRAVENOUS
Qty: 0 | Refills: 0 | Status: DISCONTINUED | OUTPATIENT
Start: 2018-07-17 | End: 2018-07-17

## 2018-07-17 RX ORDER — FENTANYL CITRATE 50 UG/ML
50 INJECTION INTRAVENOUS
Qty: 0 | Refills: 0 | Status: DISCONTINUED | OUTPATIENT
Start: 2018-07-17 | End: 2018-07-17

## 2018-07-17 RX ORDER — ASPIRIN/CALCIUM CARB/MAGNESIUM 324 MG
81 TABLET ORAL DAILY
Qty: 0 | Refills: 0 | Status: DISCONTINUED | OUTPATIENT
Start: 2018-07-17 | End: 2018-07-18

## 2018-07-17 RX ORDER — NIACIN 50 MG
1000 TABLET ORAL AT BEDTIME
Qty: 0 | Refills: 0 | Status: DISCONTINUED | OUTPATIENT
Start: 2018-07-17 | End: 2018-07-19

## 2018-07-17 RX ORDER — LORATADINE 10 MG/1
10 TABLET ORAL DAILY
Qty: 0 | Refills: 0 | Status: DISCONTINUED | OUTPATIENT
Start: 2018-07-17 | End: 2018-07-19

## 2018-07-17 RX ORDER — MORPHINE SULFATE 50 MG/1
4 CAPSULE, EXTENDED RELEASE ORAL
Qty: 0 | Refills: 0 | Status: DISCONTINUED | OUTPATIENT
Start: 2018-07-17 | End: 2018-07-19

## 2018-07-17 RX ORDER — HYDROCHLOROTHIAZIDE 25 MG
12.5 TABLET ORAL DAILY
Qty: 0 | Refills: 0 | Status: DISCONTINUED | OUTPATIENT
Start: 2018-07-17 | End: 2018-07-19

## 2018-07-17 RX ORDER — FLUVOXAMINE MALEATE 25 MG/1
25 TABLET ORAL
Qty: 0 | Refills: 0 | Status: DISCONTINUED | OUTPATIENT
Start: 2018-07-17 | End: 2018-07-17

## 2018-07-17 RX ORDER — RISPERIDONE 4 MG/1
2 TABLET ORAL
Qty: 0 | Refills: 0 | Status: DISCONTINUED | OUTPATIENT
Start: 2018-07-17 | End: 2018-07-19

## 2018-07-17 RX ORDER — ACETAMINOPHEN 500 MG
650 TABLET ORAL EVERY 6 HOURS
Qty: 0 | Refills: 0 | Status: DISCONTINUED | OUTPATIENT
Start: 2018-07-17 | End: 2018-07-17

## 2018-07-17 RX ORDER — OXYCODONE HYDROCHLORIDE 5 MG/1
5 TABLET ORAL EVERY 4 HOURS
Qty: 0 | Refills: 0 | Status: DISCONTINUED | OUTPATIENT
Start: 2018-07-17 | End: 2018-07-17

## 2018-07-17 RX ORDER — TAMSULOSIN HYDROCHLORIDE 0.4 MG/1
0.4 CAPSULE ORAL AT BEDTIME
Qty: 0 | Refills: 0 | Status: DISCONTINUED | OUTPATIENT
Start: 2018-07-17 | End: 2018-07-19

## 2018-07-17 RX ORDER — ASPIRIN/CALCIUM CARB/MAGNESIUM 324 MG
81 TABLET ORAL DAILY
Qty: 0 | Refills: 0 | Status: DISCONTINUED | OUTPATIENT
Start: 2018-07-17 | End: 2018-07-17

## 2018-07-17 RX ORDER — ATORVASTATIN CALCIUM 80 MG/1
10 TABLET, FILM COATED ORAL AT BEDTIME
Qty: 0 | Refills: 0 | Status: DISCONTINUED | OUTPATIENT
Start: 2018-07-17 | End: 2018-07-19

## 2018-07-17 RX ORDER — VALSARTAN 80 MG/1
80 TABLET ORAL DAILY
Qty: 0 | Refills: 0 | Status: DISCONTINUED | OUTPATIENT
Start: 2018-07-17 | End: 2018-07-17

## 2018-07-17 RX ORDER — ONDANSETRON 8 MG/1
4 TABLET, FILM COATED ORAL
Qty: 0 | Refills: 0 | Status: DISCONTINUED | OUTPATIENT
Start: 2018-07-17 | End: 2018-07-19

## 2018-07-17 RX ADMIN — SODIUM CHLORIDE 100 MILLILITER(S): 9 INJECTION, SOLUTION INTRAVENOUS at 18:48

## 2018-07-17 RX ADMIN — TAMSULOSIN HYDROCHLORIDE 0.4 MILLIGRAM(S): 0.4 CAPSULE ORAL at 02:24

## 2018-07-17 RX ADMIN — HEPARIN SODIUM 5000 UNIT(S): 5000 INJECTION INTRAVENOUS; SUBCUTANEOUS at 22:23

## 2018-07-17 RX ADMIN — CHLORHEXIDINE GLUCONATE 15 MILLILITER(S): 213 SOLUTION TOPICAL at 06:40

## 2018-07-17 RX ADMIN — FLUVOXAMINE MALEATE 25 MILLIGRAM(S): 25 TABLET ORAL at 02:25

## 2018-07-17 RX ADMIN — Medication 1 SPRAY(S): at 06:39

## 2018-07-17 RX ADMIN — RISPERIDONE 2 MILLIGRAM(S): 4 TABLET ORAL at 06:40

## 2018-07-17 RX ADMIN — Medication 12.5 MILLIGRAM(S): at 06:40

## 2018-07-17 RX ADMIN — Medication 100 MILLIGRAM(S): at 21:57

## 2018-07-17 RX ADMIN — SODIUM CHLORIDE 75 MILLILITER(S): 9 INJECTION, SOLUTION INTRAVENOUS at 03:59

## 2018-07-17 RX ADMIN — VALSARTAN 80 MILLIGRAM(S): 80 TABLET ORAL at 06:40

## 2018-07-17 NOTE — DISCHARGE NOTE ADULT - SECONDARY DIAGNOSIS.
Bipolar affective disorder BPH with urinary obstruction GERD (gastroesophageal reflux disease) Hyperlipidemia Hypertension

## 2018-07-17 NOTE — CHART NOTE - NSCHARTNOTEFT_GEN_A_CORE
Dx: L Trimalleolar Ankle Fx    Sx: ORIF of L Ankle    Surgeon: Luis    Clearance: Cleared    Consent: Obtained    H&P: In chart                          12.4   11.64 )-----------( 230      ( 17 Jul 2018 04:04 )             35.5     17 Jul 2018 04:17    143    |  109    |  17     ----------------------------<  112    3.6     |  26     |  0.67     Ca    9.0        17 Jul 2018 04:17    TPro  7.7    /  Alb  3.9    /  TBili  0.4    /  DBili  x      /  AST  23     /  ALT  25     /  AlkPhos  116    16 Jul 2018 07:25    PT/INR - ( 17 Jul 2018 04:04 )   PT: 13.7 sec;   INR: 1.26 ratio         PTT - ( 17 Jul 2018 04:04 )  PTT:35.1 sec    Type + Screen 07-16 @ 10:55  --  --  NEG  --  --  A POS      UA: Pending    EKG: Complete    CXR: Complete

## 2018-07-17 NOTE — PROGRESS NOTE ADULT - ASSESSMENT
63M w/ severe MR with L Trimalleolar Ankle Fracture Dislocation  Medical Optimization for OR  Analgesia  OR today for ORIF of L ankle  Hold chemical AC ppx for OR today  NWB LLE  NPO for OR today  IVF while NPO  FU CT LLE  All risks, benefits and alternatives to the recommended surgical procedure were discussed which include but are not limited to bleeding, infection, nerve damage, vascular damage, failure of the wound to heal, the need for further surgery, loss of limb, DVT, PE, loss of life as well as the risks associated with general anesthesia. The patient verbalized understanding and provided informed consent to move forward with surgery.  All questions were answered and the patient verbalized understanding.  The patient is in agreement with this treatment plan.  Discussed at length with the patient's brother healthcare proxy

## 2018-07-17 NOTE — PROGRESS NOTE ADULT - ASSESSMENT
A/P: 63M with L trimalleolar ankle fracture  Plan for OR today with Dr. Smith for ORIF  NPO except meds  IVFs while npo  Hold chemical dvt prophylaxis   SCD  Medical optimization for OR  FU Labs  Pain control  NWB LLE in splint  Keep splint c/d/i  Ice/elevation at all times

## 2018-07-17 NOTE — BRIEF OPERATIVE NOTE - OPERATION/FINDINGS
Comminuted fracture of the medial malleolus with displaced fractures of the fibula and the posterior malleolus.

## 2018-07-17 NOTE — DISCHARGE NOTE ADULT - MEDICATION SUMMARY - MEDICATIONS TO STOP TAKING
I will STOP taking the medications listed below when I get home from the hospital:  None I will STOP taking the medications listed below when I get home from the hospital:    Aspirin Enteric Coated 81 mg oral delayed release tablet  -- 1 tab(s) by mouth once a day

## 2018-07-17 NOTE — PROGRESS NOTE ADULT - ASSESSMENT
62yo M with L trimalleolar fracture secondary to unwitnessed fall down 8 steps staircase. Pt is currently at normal baseline behavior. Pt observed in the emergency room for ~12-16hrs and has been hemodynamically stable, troponin x1 negative

## 2018-07-17 NOTE — BRIEF OPERATIVE NOTE - PRE-OP DX
Closed fracture of left ankle, initial encounter  07/17/2018  Trimalleolar fracture of the Left ankle  Active  Tania Juarez

## 2018-07-17 NOTE — DISCHARGE NOTE ADULT - CARE PLAN
Principal Discharge DX:	Closed fracture of left ankle, initial encounter  Goal:	Return to baseline ADLs  Assessment and plan of treatment:	1.	Pain Control  2.	Non-Weight Bearing left lower Extremity in splint, with assistive devices as needed  3.	DVT Prophylaxis as instructed by AC team, see med rec for details  4.	PT as needed  5.	Follow up with Dr. Smith as outpatient in 10-14 days after discharge from the hospital or rehab. Call office for appointment.   6.	Staple/Suture removal and repeat x-rays on  Post-Op Day 14  7.	Ice/Elevate affected area as needed  8.	Keep Splint Clean and dry. Principal Discharge DX:	Closed fracture of left ankle, initial encounter  Goal:	Return to baseline ADLs  Assessment and plan of treatment:	1.	Pain Control  2.	Non-Weight Bearing left lower Extremity in splint, with assistive devices as needed  3.	Aspirin 325 mg twice daily until 8/15/2018, may take home dose baby aspirin thereafter  4.	PT as needed  5.	Follow up with Dr. Smith as outpatient in 10-14 days after discharge from the hospital or rehab. Call office for appointment.   6.	Staple/Suture removal and repeat x-rays on  Post-Op Day 14  7.	Ice/Elevate affected area as needed  8.	Keep Splint Clean and dry.  Secondary Diagnosis:	Bipolar affective disorder  Goal:	Stable patient  Assessment and plan of treatment:	Continue home medications. Follow-up with primary care physician within 1-2 weeks  Secondary Diagnosis:	BPH with urinary obstruction  Goal:	Stable patient  Assessment and plan of treatment:	Continue home medications. Follow-up with primary care physician within 1-2 weeks  Secondary Diagnosis:	GERD (gastroesophageal reflux disease)  Goal:	Stable patient  Assessment and plan of treatment:	May add or increase dose of PPI if experiences stomach discomfort from aspirin at increased dose.  Secondary Diagnosis:	Hyperlipidemia  Goal:	Stable patient  Assessment and plan of treatment:	Continue home medications. Follow-up with primary care physician within 1-2 weeks.  Secondary Diagnosis:	Hypertension  Goal:	Stable patient  Assessment and plan of treatment:	Continue home medications. Follow-up with primary care physician within 1-2 weeks.

## 2018-07-17 NOTE — BRIEF OPERATIVE NOTE - POST-OP DX
Closed fracture of left ankle, initial encounter  07/17/2018  Trimalleolar ankle fracture of Left side  Active  Tania Juarez

## 2018-07-17 NOTE — BRIEF OPERATIVE NOTE - PROCEDURE
<<-----Click on this checkbox to enter Procedure Ankle fracture surgery  07/17/2018    Active  CTONG

## 2018-07-17 NOTE — DISCHARGE NOTE ADULT - CARE PROVIDERS DIRECT ADDRESSES
,[email or web address]_Unknown

## 2018-07-17 NOTE — DISCHARGE NOTE ADULT - PLAN OF CARE
Return to baseline ADLs 1.	Pain Control  2.	Non-Weight Bearing left lower Extremity in splint, with assistive devices as needed  3.	DVT Prophylaxis as instructed by AC team, see med rec for details  4.	PT as needed  5.	Follow up with Dr. Smith as outpatient in 10-14 days after discharge from the hospital or rehab. Call office for appointment.   6.	Staple/Suture removal and repeat x-rays on  Post-Op Day 14  7.	Ice/Elevate affected area as needed  8.	Keep Splint Clean and dry. 1.	Pain Control  2.	Non-Weight Bearing left lower Extremity in splint, with assistive devices as needed  3.	Aspirin 325 mg twice daily until 8/15/2018, may take home dose baby aspirin thereafter  4.	PT as needed  5.	Follow up with Dr. Smith as outpatient in 10-14 days after discharge from the hospital or rehab. Call office for appointment.   6.	Staple/Suture removal and repeat x-rays on  Post-Op Day 14  7.	Ice/Elevate affected area as needed  8.	Keep Splint Clean and dry. Stable patient Continue home medications. Follow-up with primary care physician within 1-2 weeks May add or increase dose of PPI if experiences stomach discomfort from aspirin at increased dose. Continue home medications. Follow-up with primary care physician within 1-2 weeks.

## 2018-07-17 NOTE — DISCHARGE NOTE ADULT - PROVIDER TOKENS
DANIELLE:'[id number]' TOKEN:'[id number]',FREE:[LAST:[[facility]valis],PHONE:[(   )    -],FAX:[(   )    -]]

## 2018-07-17 NOTE — DISCHARGE NOTE ADULT - PATIENT PORTAL LINK FT
You can access the [facility] Patient Portal, offered by [facility], by registering with the following website: [email or web address]

## 2018-07-17 NOTE — DISCHARGE NOTE ADULT - CARE PROVIDER_API CALL
Aly Smith (DO), [facility]  [address] [place] [address]  Phone: [phone]  Fax: [phone] Aly Smith (DO), [facility]  [address] [place] [address]  Phone: [phone]  Fax: [phone]    Rosalia,   Phone: (   )    -  Fax: (   )    -

## 2018-07-17 NOTE — DISCHARGE NOTE ADULT - MEDICATION SUMMARY - MEDICATIONS TO TAKE
I will START or STAY ON the medications listed below when I get home from the hospital:    chlorhexadine rinse  -- 0.5 dose(s) by mouth 2 times a day Swish and spit out  -- Indication: For Oral hygiene     acetaminophen 325 mg oral tablet  -- 2 tab(s) by mouth every 6 hours, As needed, Mild Pain (1 - 3)  -- Indication: For As needed for pain    oxyCODONE 5 mg oral tablet  -- 1 tab(s) by mouth every 8 hours, As Needed for pain for 3 days  -- Indication: For As needed for pain    Aspirin Enteric Coated 81 mg oral delayed release tablet  -- 1 tab(s) by mouth once a day  -- Indication: For Heart health    tamsulosin 0.4 mg oral capsule  -- 1 cap(s) by mouth once a day  -- Indication: For BPH with urinary obstruction    fluvoxaMINE 50 mg oral tablet  -- 0.5 tab(s) by mouth 2 times a day  -- Indication: For Bipolar affective disorder    loratadine 10 mg oral tablet  -- 1 tab(s) by mouth once a day  -- Indication: For Allergies    niacin 1000 mg oral tablet, extended release  -- 1 tab(s) by mouth once a day (at bedtime)  -- Indication: For Supplement    pravastatin 40 mg oral tablet  -- 1 tab(s) by mouth once a day  -- Indication: For Cholesterol    valsartan-hydrochlorothiazide 80mg-12.5mg oral tablet  -- 1 tab(s) by mouth once a day  -- Indication: For Blood pressure    risperiDONE 2 mg oral tablet  -- 1 tab(s) by mouth 2 times a day  -- Indication: For Bipolar affective disorder    Lidoderm 5% topical film  -- Apply on skin to affected area once a day (at bedtime), As Needed  -- Indication: For Pain    fluticasone 50 mcg/inh nasal spray  -- 1 spray(s) into nose 2 times a day  -- Indication: For Allergies (duplicate, only need one)    fluticasone 50 mcg inhalation powder  -- 1 puff(s) inhaled 2 times a day  -- Indication: For Allergies (duplicate, only need one)    Multiple Vitamins oral capsule  -- 1 cap(s) by mouth once a day  -- Indication: For Supplement    Calcium 600+D oral tablet  -- 1 tab(s) by mouth 2 times a day  -- Indication: For Supplement I will START or STAY ON the medications listed below when I get home from the hospital:    chlorhexadine rinse  -- 0.5 dose(s) by mouth 2 times a day Swish and spit out  -- Indication: For Oral hygiene     acetaminophen 325 mg oral tablet  -- 2 tab(s) by mouth every 6 hours, As needed, Mild Pain (1 - 3)  -- Indication: For As needed for pain    oxyCODONE 5 mg oral tablet  -- 1 tab(s) by mouth every 8 hours, As Needed for pain for 3 days  -- Indication: For As needed for pain    Aspirin Enteric Coated 325 mg oral delayed release tablet  -- 1 tab(s) by mouth 2 times a day for 4 weeks  -- Indication: For Blood clot prevention. Resume 81 mg AFTER     tamsulosin 0.4 mg oral capsule  -- 1 cap(s) by mouth once a day  -- Indication: For BPH with urinary obstruction    fluvoxaMINE 50 mg oral tablet  -- 0.5 tab(s) by mouth 2 times a day  -- Indication: For Bipolar affective disorder    loratadine 10 mg oral tablet  -- 1 tab(s) by mouth once a day  -- Indication: For Allergies    niacin 1000 mg oral tablet, extended release  -- 1 tab(s) by mouth once a day (at bedtime)  -- Indication: For Supplement    pravastatin 40 mg oral tablet  -- 1 tab(s) by mouth once a day  -- Indication: For Cholesterol    valsartan-hydrochlorothiazide 80mg-12.5mg oral tablet  -- 1 tab(s) by mouth once a day  -- Indication: For Blood pressure    risperiDONE 2 mg oral tablet  -- 1 tab(s) by mouth 2 times a day  -- Indication: For Bipolar affective disorder    Lidoderm 5% topical film  -- Apply on skin to affected area once a day (at bedtime), As Needed  -- Indication: For Pain    fluticasone 50 mcg/inh nasal spray  -- 1 spray(s) into nose 2 times a day  -- Indication: For Allergies (duplicate, only need one)    fluticasone 50 mcg inhalation powder  -- 1 puff(s) inhaled 2 times a day  -- Indication: For Allergies (duplicate, only need one)    Multiple Vitamins oral capsule  -- 1 cap(s) by mouth once a day  -- Indication: For Supplement    Calcium 600+D oral tablet  -- 1 tab(s) by mouth 2 times a day  -- Indication: For Supplement

## 2018-07-17 NOTE — PROGRESS NOTE ADULT - PROBLEM SELECTOR PLAN 1
s/p ORIF, ortho resident note appreciated, no complications  LLE in ACE bandage      Keep LLE elevated and non-weight bearing  Morphine PRN for pain      RCRI score of 0.4%- pt at low risk for surgical intervention  Pt can be cleared for surgery at this time

## 2018-07-17 NOTE — PROGRESS NOTE ADULT - ATTENDING COMMENTS
Patient seen and examined with Family Medicine Residents Summer Giraldo, Chaparrita Gabriel and Kailee Gama on the Family Medicine Teaching Service.  Agree with history, physical, labs and plan which were reviewed in detail after a face to face encounter with the patient.

## 2018-07-17 NOTE — DISCHARGE NOTE ADULT - HOSPITAL COURSE
Pt is a 64 yo, nonverbal, male with pertinent h/o severe Intellectual Disability, HTN, presenting with LLE deformity following a fall at group home. Per pt's aide who is present with pt, the fall was unwitnessed, pt was found lying at the bottom of ~8 steps staircase, unknown if there was any LOC. No known previous falls in the past. Per aide, pt is independent at the group home, walks unassisted with any device. Staff denies any recent illness or change in mental status. Pt is reported to be at his normal baseline. Now s/p repair of LLE trimalleolar fracture. Doing well. Pt is a 64 yo, nonverbal, male with pertinent h/o severe Intellectual Disability, HTN, presenting with LLE deformity following a fall at group home. Per pt's aide who is present with pt, the fall was unwitnessed, pt was found lying at the bottom of ~8 steps staircase, unknown if there was any LOC. No known previous falls in the past. Per aide, pt is independent at the group home, walks unassisted with any device. Staff denies any recent illness or change in mental status. Pt is reported to be at his normal baseline. Now s/p repair of LLE trimalleolar fracture. Doing well.    Orthopedic Summary  H&P:  Pt is a 63y Male  PAST MEDICAL & SURGICAL HISTORY:  Mental retardation  Aggressiveness  Agitation  Nonverbal  Autism  Bipolar affective disorder  Schizophrenia, paranoid type  GERD (gastroesophageal reflux disease)  Hypertension  Hyperlipidemia  Hematuria  BPH (benign prostatic hypertrophy) with urinary obstruction  No significant past surgical history       Now s/p ORIF Left trimal. Pt is afebrile with stable vital signs. Pain is controlled. Alert and Oriented. Exam reveals intact EHL FHL +DP. Splint is clean and dry and intact.  Vital Signs Last 24 Hrs  T(C): 36.8 (07-19-18 @ 05:58), Max: 36.9 (07-18-18 @ 11:23)  T(F): 98.2 (07-19-18 @ 05:58), Max: 98.4 (07-18-18 @ 11:23)  HR: 77 (07-19-18 @ 05:58) (74 - 95)  BP: 115/54 (07-19-18 @ 05:58) (94/57 - 115/54)  BP(mean): 3 (07-18-18 @ 17:01) (3 - 3)  RR: 16 (07-19-18 @ 05:58) (16 - 17)  SpO2: 93% (07-19-18 @ 05:58) (93% - 99%)                        10.7   11.36 )-----------( 197      ( 19 Jul 2018 06:18 )             30.8         Hospital Course:     The patient is a 63y Male status post Open Reduction Internal Fixation of a Left  Trimal Fracture. Pt sustained injury from a fall and was admitted through the ED. Prior to surgery Patient was medically optimized. Prophylactic antibiotics were started within 30 minutes before the procedure and continued for 24 hours.  There were no complications during the procedure.  The patient was transferred to recovery room in stable condition and subsequently to the orthopedic floor.  Patient was placed on ECASA 325BID for DVT/PE prophylaxis per the Anticoagulation Team.  All home medications were continued.  Physical therapy daily and daily labs were followed.  The Splint was kept clean, dry, intact. IF applicable, dressing was changed prior to discharge. The rest of the hospital stay was unremarkable.

## 2018-07-18 LAB
ANION GAP SERPL CALC-SCNC: 11 MMOL/L — SIGNIFICANT CHANGE UP (ref 5–17)
BASOPHILS # BLD AUTO: 0.02 K/UL — SIGNIFICANT CHANGE UP (ref 0–0.2)
BASOPHILS NFR BLD AUTO: 0.1 % — SIGNIFICANT CHANGE UP (ref 0–2)
BUN SERPL-MCNC: 13 MG/DL — SIGNIFICANT CHANGE UP (ref 7–23)
CALCIUM SERPL-MCNC: 9 MG/DL — SIGNIFICANT CHANGE UP (ref 8.5–10.1)
CHLORIDE SERPL-SCNC: 108 MMOL/L — SIGNIFICANT CHANGE UP (ref 96–108)
CO2 SERPL-SCNC: 23 MMOL/L — SIGNIFICANT CHANGE UP (ref 22–31)
CREAT SERPL-MCNC: 0.76 MG/DL — SIGNIFICANT CHANGE UP (ref 0.5–1.3)
EOSINOPHIL # BLD AUTO: 0 K/UL — SIGNIFICANT CHANGE UP (ref 0–0.5)
EOSINOPHIL NFR BLD AUTO: 0 % — SIGNIFICANT CHANGE UP (ref 0–6)
GLUCOSE SERPL-MCNC: 119 MG/DL — HIGH (ref 70–99)
HCT VFR BLD CALC: 32.4 % — LOW (ref 39–50)
HGB BLD-MCNC: 11.5 G/DL — LOW (ref 13–17)
IMM GRANULOCYTES NFR BLD AUTO: 0.5 % — SIGNIFICANT CHANGE UP (ref 0–1.5)
LYMPHOCYTES # BLD AUTO: 0.93 K/UL — LOW (ref 1–3.3)
LYMPHOCYTES # BLD AUTO: 6.2 % — LOW (ref 13–44)
MCHC RBC-ENTMCNC: 31.1 PG — SIGNIFICANT CHANGE UP (ref 27–34)
MCHC RBC-ENTMCNC: 35.5 GM/DL — SIGNIFICANT CHANGE UP (ref 32–36)
MCV RBC AUTO: 87.6 FL — SIGNIFICANT CHANGE UP (ref 80–100)
MONOCYTES # BLD AUTO: 1.16 K/UL — HIGH (ref 0–0.9)
MONOCYTES NFR BLD AUTO: 7.7 % — SIGNIFICANT CHANGE UP (ref 2–14)
NEUTROPHILS # BLD AUTO: 12.89 K/UL — HIGH (ref 1.8–7.4)
NEUTROPHILS NFR BLD AUTO: 85.5 % — HIGH (ref 43–77)
NRBC # BLD: 0 /100 WBCS — SIGNIFICANT CHANGE UP (ref 0–0)
PLATELET # BLD AUTO: 207 K/UL — SIGNIFICANT CHANGE UP (ref 150–400)
POTASSIUM SERPL-MCNC: 3.6 MMOL/L — SIGNIFICANT CHANGE UP (ref 3.5–5.3)
POTASSIUM SERPL-SCNC: 3.6 MMOL/L — SIGNIFICANT CHANGE UP (ref 3.5–5.3)
RBC # BLD: 3.7 M/UL — LOW (ref 4.2–5.8)
RBC # FLD: 11.6 % — SIGNIFICANT CHANGE UP (ref 10.3–14.5)
SODIUM SERPL-SCNC: 142 MMOL/L — SIGNIFICANT CHANGE UP (ref 135–145)
WBC # BLD: 15.07 K/UL — HIGH (ref 3.8–10.5)
WBC # FLD AUTO: 15.07 K/UL — HIGH (ref 3.8–10.5)

## 2018-07-18 PROCEDURE — 99223 1ST HOSP IP/OBS HIGH 75: CPT

## 2018-07-18 RX ORDER — PANTOPRAZOLE SODIUM 20 MG/1
40 TABLET, DELAYED RELEASE ORAL
Qty: 0 | Refills: 0 | Status: DISCONTINUED | OUTPATIENT
Start: 2018-07-19 | End: 2018-07-19

## 2018-07-18 RX ORDER — ASPIRIN/CALCIUM CARB/MAGNESIUM 324 MG
325 TABLET ORAL
Qty: 0 | Refills: 0 | Status: DISCONTINUED | OUTPATIENT
Start: 2018-07-18 | End: 2018-07-19

## 2018-07-18 RX ADMIN — HEPARIN SODIUM 5000 UNIT(S): 5000 INJECTION INTRAVENOUS; SUBCUTANEOUS at 05:47

## 2018-07-18 RX ADMIN — Medication 12.5 MILLIGRAM(S): at 05:48

## 2018-07-18 RX ADMIN — Medication 100 MILLIGRAM(S): at 10:56

## 2018-07-18 RX ADMIN — Medication 1 SPRAY(S): at 05:47

## 2018-07-18 RX ADMIN — RISPERIDONE 2 MILLIGRAM(S): 4 TABLET ORAL at 18:06

## 2018-07-18 RX ADMIN — TAMSULOSIN HYDROCHLORIDE 0.4 MILLIGRAM(S): 0.4 CAPSULE ORAL at 22:38

## 2018-07-18 RX ADMIN — ATORVASTATIN CALCIUM 10 MILLIGRAM(S): 80 TABLET, FILM COATED ORAL at 22:38

## 2018-07-18 RX ADMIN — Medication 81 MILLIGRAM(S): at 10:56

## 2018-07-18 RX ADMIN — CHLORHEXIDINE GLUCONATE 15 MILLILITER(S): 213 SOLUTION TOPICAL at 18:06

## 2018-07-18 RX ADMIN — Medication 1 SPRAY(S): at 18:06

## 2018-07-18 RX ADMIN — VALSARTAN 80 MILLIGRAM(S): 80 TABLET ORAL at 05:47

## 2018-07-18 RX ADMIN — Medication 1 TABLET(S): at 10:55

## 2018-07-18 RX ADMIN — Medication 1 TABLET(S): at 10:56

## 2018-07-18 RX ADMIN — Medication 325 MILLIGRAM(S): at 18:05

## 2018-07-18 RX ADMIN — RISPERIDONE 2 MILLIGRAM(S): 4 TABLET ORAL at 05:46

## 2018-07-18 RX ADMIN — FLUVOXAMINE MALEATE 25 MILLIGRAM(S): 25 TABLET ORAL at 05:46

## 2018-07-18 RX ADMIN — FLUVOXAMINE MALEATE 25 MILLIGRAM(S): 25 TABLET ORAL at 18:05

## 2018-07-18 RX ADMIN — Medication 1000 MILLIGRAM(S): at 22:38

## 2018-07-18 RX ADMIN — LORATADINE 10 MILLIGRAM(S): 10 TABLET ORAL at 10:55

## 2018-07-18 RX ADMIN — Medication 100 MILLIGRAM(S): at 05:41

## 2018-07-18 NOTE — PHYSICAL THERAPY INITIAL EVALUATION ADULT - LEVEL OF INDEPENDENCE: SIT/STAND, REHAB EVAL
Patient not able to follow commands to keep NWBg,getting frustrated and sat himself back into the bed x2./unable to perform

## 2018-07-18 NOTE — PHYSICAL THERAPY INITIAL EVALUATION ADULT - GENERAL OBSERVATIONS, REHAB EVAL
Patient received in bed, L ankle in splint. Group home aide at bedside. +O2@2L, +B SCDs, +IV disconnected for session by RN. Patient non verbal, h/o severe Intellectual Disability,

## 2018-07-18 NOTE — PROGRESS NOTE ADULT - ASSESSMENT
A/p: 63M w/ L trimalleolar Fx and dislocation POD1  Pain Control  DVT ppx  NWB LLE in trilam  PT  Incentive Spirometry  Medical management appreciated  DC planning

## 2018-07-18 NOTE — CONSULT NOTE ADULT - ASSESSMENT
A 64 y/o male with a pmhx of mental retardation, baseline nonverbal, HTN, presenting with LLE deformity following a fall at group home and found to have left trimalleolar fracture and dislocation who underwent ORIF with Dr. Smith yesterday 7/17/18, POD #1.     PLAN:   Start  mg BID starting today x 4 weeks (7/18/18 to 8/15/18)  start PPI with protonix 40 mg daily while on ASA therapy  encourage mobilization and maintain venodyne  f/u repeat CBC/BMP    Thank you for consult, will sign off.

## 2018-07-18 NOTE — CONSULT NOTE ADULT - SUBJECTIVE AND OBJECTIVE BOX
HPI: This is a 62 yo, nonverbal, male with pertinent h/o severe Intellectual Disability, HTN, presenting with LLE deformity following a fall at group home. Per pt's aide who is present with pt, the fall was unwitnessed, pt was found lying at the bottom of ~8 steps staircase, unknown if there was any LOC. No known previous falls in the past. Per aide, pt is independent at the group home, walks unassisted with any device. Staff denies any recent illness or change in mental status. Pt is reported to be at his normal baseline.    In the ED, pt is afebrile, normotensive (BP of 119/69), XR of the Left ankle showed displaced fracture of the medial malleolus with lateral tibiotalar dislocation. Orthopedics was consulted with ORIF in the OR planned for the AM. Pt received x1 dose of morphine for pain    CBC and CMP were also obtained and wnl. CT head with no evidence of intracranial bleed or infarct, CT cervical spine with no evidence of fracture or dislocation. (16 Jul 2018 22:28)    7/18/18: Pt seen at bedside, resting comfortably. He is nonverbal but follows 1 simple step command.     Patient is a 63y old  Male who presents with a chief complaint of Unwitnessed fall with left lower extremity deformity (17 Jul 2018 22:46)    Consulted by Dr. Smith for VTE prophylaxis, risk stratification, and anticoagulation management.    PAST MEDICAL & SURGICAL HISTORY:  Mental retardation  Aggressiveness  Agitation  Nonverbal  Autism  Bipolar affective disorder  Schizophrenia, paranoid type  GERD (gastroesophageal reflux disease)  Hypertension  Hyperlipidemia  Hematuria  BPH (benign prostatic hypertrophy) with urinary obstruction  No significant past surgical history    BMI: 24.4    CrCL: 105.67    Caprini VTE Risk Score: 7    IMPROVE Bleeding Risk Score: 2.5    Falls Risk:   High ( X )  Mod (  )  Low (  )    EBL: 150 ml    FAMILY HISTORY:  No pertinent family history in first degree relatives  Family history unknown    Denies any personal or familial history of clotting or bleeding disorders.    Allergies    No Known Allergies    Intolerances    REVIEW OF SYSTEMS    (  )Fever	     (  )Constipation	(  )SOB			(  )Headache	(  )Dysuria  (  )Chills	     (  )Melena	(  )Dyspnea present on exertion    (  )Dizziness                    (  )Polyuria  (  )Nausea	     (  )Hematochezia	(  )Cough		                    (  )Syncope   	(  )Hematuria  (  )Vomiting    (  )Chest Pain	(  )Wheezing		(  )Weakness  (  )Diarrhea     (  )Palpitations	(  )Anorexia		(  )Myalgia    Unable to obtain review of systems due to: nonverbal      PHYSICAL EXAM:    Constitutional: Appears Well    Neurological: Awake, nonverbal, follows 1 step simple commands    Skin: Warm    Respiratory and Thorax: normal effort; Breath sounds: normal; No rales/wheezing/rhonchi  	  Cardiovascular: S1, S2, regular, NMBR	    Gastrointestinal: BS + x 4Q, nontender	    Genitourinary:  Bladder nondistended, nontender    Musculoskeletal:   General Right:   no muscle/joint tenderness,   normal tone, no joint swelling,   ROM: full	    General Left:   no muscle/joint tenderness,   normal tone, no joint swelling,   ROM: limited    Left ankle, ACE wrap, no oozing noted, dressing intact    Lower extrems:   Right: no calf tenderness              negative annie's sign               + pedal pulses    Left:   no calf tenderness              negative annie's sign               + pedal pulses                          11.5   15.07 )-----------( 207      ( 18 Jul 2018 06:17 )             32.4       07-18    142  |  108  |  13  ----------------------------<  119<H>  3.6   |  23  |  0.76    Ca    9.0      18 Jul 2018 06:17    PT/INR - ( 17 Jul 2018 04:04 )   PT: 13.7 sec;   INR: 1.26 ratio    PTT - ( 17 Jul 2018 04:04 )  PTT:35.1 sec				    Vital Signs Last 24 Hrs  T(C): 36.9 (18 Jul 2018 11:23), Max: 36.9 (17 Jul 2018 18:20)  T(F): 98.4 (18 Jul 2018 11:23), Max: 98.4 (17 Jul 2018 18:20)  HR: 95 (18 Jul 2018 11:23) (56 - 95)  BP: 94/57 (18 Jul 2018 11:23) (94/57 - 125/55)  BP(mean): --  RR: 16 (18 Jul 2018 11:23) (13 - 17)  SpO2: 99% (18 Jul 2018 11:23) (97% - 100%)    MEDICATIONS  (STANDING):  aspirin 325 milliGRAM(s) Oral two times a day  atorvastatin 10 milliGRAM(s) Oral at bedtime  calcium carbonate 1250 mG  + Vitamin D (OsCal 500 + D) 1 Tablet(s) Oral daily  chlorhexidine 0.12% Liquid 15 milliLiter(s) Swish and Spit two times a day  docusate sodium 100 milliGRAM(s) Oral daily  fluticasone propionate 50 MICROgram(s)/spray Nasal Spray 1 Spray(s) Both Nostrils two times a day  fluvoxaMINE 25 milliGRAM(s) Oral two times a day  hydrochlorothiazide 12.5 milliGRAM(s) Oral daily  lidocaine   Patch 1 Patch Transdermal at bedtime  loratadine 10 milliGRAM(s) Oral daily  multivitamin 1 Tablet(s) Oral daily  niacin SR 1000 milliGRAM(s) Oral at bedtime  risperiDONE   Tablet 2 milliGRAM(s) Oral two times a day  tamsulosin 0.4 milliGRAM(s) Oral at bedtime  valsartan 80 milliGRAM(s) Oral daily    DVT Prophylaxis:  LMWH                   (  )  Heparin SQ           (  )  Coumadin             (  )  Xarelto                  (  )  Eliquis                   (  )  Venodynes           ( X )  Ambulation          ( X )  UFH                       (  )  Contraindicated  (  )  ASA ( x )

## 2018-07-18 NOTE — PROGRESS NOTE ADULT - ASSESSMENT
Pt is a 64 yo, nonverbal, male with pertinent h/o severe Intellectual Disability, HTN, presenting with LLE deformity following a fall at group home. Per pt's aide who is present with pt, the fall was unwitnessed, pt was found lying at the bottom of ~8 steps staircase, unknown if there was any LOC. No known previous falls in the past. Per aide, pt is independent at the group home, walks unassisted with any device. Staff denies any recent illness or change in mental status. Pt is reported to be at his normal baseline.    In the ED, pt is afebrile, normotensive (BP of 119/69), XR of the Left ankle showed displaced fracture of the medial malleolus with lateral tibiotalar dislocation. Orthopedics was consulted with ORIF in the OR planned for the AM. Pt received x1 dose of morphine for pain    CBC and CMP were also obtained and wnl. CT head with no evidence of intracranial bleed or infarct, CT cervical spine with no evidence of fracture or dislocation. (16 Jul 2018 22:28) Pt is a 62 yo, nonverbal, male with pertinent h/o severe Intellectual Disability, HTN, presenting with a trimalleolar fracture with a lateral tibiotalar dislocation s/p ORIF and is doing well.     Trimalleolar fracture and lateral tibiotalar dislocation  -S/P ORIF  -Physical therapy saw pt.  -Continue Morphine 4mg PRN for pain management      HTN  Pts blood pressures have been stable will continue hydrochlorothiazide 12.5mg and valsartan 80mg    HLD  -continue atorvastatin 10mg     Dispo  -pt will be monitored for one more night and will be discharged tomorrow.

## 2018-07-18 NOTE — PHYSICAL THERAPY INITIAL EVALUATION ADULT - ACTIVE RANGE OF MOTION EXAMINATION, REHAB EVAL
bilateral  lower extremity Active ROM was WFL (within functional limits)/L ankle not tested/bilateral upper extremity Active ROM was WFL (within functional limits)

## 2018-07-19 VITALS
SYSTOLIC BLOOD PRESSURE: 99 MMHG | DIASTOLIC BLOOD PRESSURE: 58 MMHG | TEMPERATURE: 98 F | RESPIRATION RATE: 18 BRPM | HEART RATE: 96 BPM | OXYGEN SATURATION: 95 %

## 2018-07-19 LAB
ANION GAP SERPL CALC-SCNC: 5 MMOL/L — SIGNIFICANT CHANGE UP (ref 5–17)
BUN SERPL-MCNC: 14 MG/DL — SIGNIFICANT CHANGE UP (ref 7–23)
CALCIUM SERPL-MCNC: 8.8 MG/DL — SIGNIFICANT CHANGE UP (ref 8.5–10.1)
CHLORIDE SERPL-SCNC: 108 MMOL/L — SIGNIFICANT CHANGE UP (ref 96–108)
CO2 SERPL-SCNC: 29 MMOL/L — SIGNIFICANT CHANGE UP (ref 22–31)
CREAT SERPL-MCNC: 0.65 MG/DL — SIGNIFICANT CHANGE UP (ref 0.5–1.3)
GLUCOSE SERPL-MCNC: 100 MG/DL — HIGH (ref 70–99)
HCT VFR BLD CALC: 30.8 % — LOW (ref 39–50)
HGB BLD-MCNC: 10.7 G/DL — LOW (ref 13–17)
MCHC RBC-ENTMCNC: 30.8 PG — SIGNIFICANT CHANGE UP (ref 27–34)
MCHC RBC-ENTMCNC: 34.7 GM/DL — SIGNIFICANT CHANGE UP (ref 32–36)
MCV RBC AUTO: 88.8 FL — SIGNIFICANT CHANGE UP (ref 80–100)
NRBC # BLD: 0 /100 WBCS — SIGNIFICANT CHANGE UP (ref 0–0)
PLATELET # BLD AUTO: 197 K/UL — SIGNIFICANT CHANGE UP (ref 150–400)
POTASSIUM SERPL-MCNC: 3.7 MMOL/L — SIGNIFICANT CHANGE UP (ref 3.5–5.3)
POTASSIUM SERPL-SCNC: 3.7 MMOL/L — SIGNIFICANT CHANGE UP (ref 3.5–5.3)
RBC # BLD: 3.47 M/UL — LOW (ref 4.2–5.8)
RBC # FLD: 11.7 % — SIGNIFICANT CHANGE UP (ref 10.3–14.5)
SODIUM SERPL-SCNC: 142 MMOL/L — SIGNIFICANT CHANGE UP (ref 135–145)
WBC # BLD: 11.36 K/UL — HIGH (ref 3.8–10.5)
WBC # FLD AUTO: 11.36 K/UL — HIGH (ref 3.8–10.5)

## 2018-07-19 RX ORDER — LIDOCAINE 4 G/100G
1 CREAM TOPICAL
Qty: 0 | Refills: 0 | COMMUNITY
Start: 2018-07-19

## 2018-07-19 RX ORDER — FLUTICASONE PROPIONATE 50 MCG
1 SPRAY, SUSPENSION NASAL
Qty: 0 | Refills: 0 | DISCHARGE
Start: 2018-07-19

## 2018-07-19 RX ORDER — ASPIRIN/CALCIUM CARB/MAGNESIUM 324 MG
1 TABLET ORAL
Qty: 0 | Refills: 0 | COMMUNITY

## 2018-07-19 RX ORDER — ACETAMINOPHEN 500 MG
2 TABLET ORAL
Qty: 0 | Refills: 0 | COMMUNITY
Start: 2018-07-19

## 2018-07-19 RX ADMIN — Medication 12.5 MILLIGRAM(S): at 06:45

## 2018-07-19 RX ADMIN — LORATADINE 10 MILLIGRAM(S): 10 TABLET ORAL at 13:05

## 2018-07-19 RX ADMIN — Medication 1 TABLET(S): at 13:05

## 2018-07-19 RX ADMIN — VALSARTAN 80 MILLIGRAM(S): 80 TABLET ORAL at 06:45

## 2018-07-19 RX ADMIN — Medication 1 SPRAY(S): at 06:46

## 2018-07-19 RX ADMIN — PANTOPRAZOLE SODIUM 40 MILLIGRAM(S): 20 TABLET, DELAYED RELEASE ORAL at 06:46

## 2018-07-19 RX ADMIN — CHLORHEXIDINE GLUCONATE 15 MILLILITER(S): 213 SOLUTION TOPICAL at 06:47

## 2018-07-19 RX ADMIN — Medication 100 MILLIGRAM(S): at 13:05

## 2018-07-19 RX ADMIN — RISPERIDONE 2 MILLIGRAM(S): 4 TABLET ORAL at 06:45

## 2018-07-19 RX ADMIN — FLUVOXAMINE MALEATE 25 MILLIGRAM(S): 25 TABLET ORAL at 06:45

## 2018-07-19 RX ADMIN — Medication 325 MILLIGRAM(S): at 06:45

## 2018-07-19 NOTE — PROGRESS NOTE ADULT - ASSESSMENT
A/p: 63M w/ L trimalleolar Fx and dislocation POD2  Pain Control  DVT ppx  NWB LLE in trilam  PT  Incentive Spirometry  Medical management appreciated  DC planning

## 2018-07-19 NOTE — PROGRESS NOTE ADULT - SUBJECTIVE AND OBJECTIVE BOX
CHIEF COMPLAINT: Unwitnessed fall with LLE deformity    SUBJECTIVE: Pt is a 64 yo, nonverbal, male with pertinent h/o severe Intellectual Disability, HTN, presenting with LLE deformity following a fall at group home. Per pt's aide who is present with pt, the fall was unwitnessed, pt was found lying at the bottom of ~8 steps staircase, unknown if there was any LOC. No known previous falls in the past. Per aide, pt is independent at the group home, walks unassisted with any device. Staff denies any recent illness or change in mental status. Pt is reported to be at his normal baseline.    In the ED, pt is afebrile, normotensive (BP of 119/69), XR of the Left ankle showed displaced fracture of the medial malleolus with lateral tibiotalar dislocation. Orthopedics was consulted with ORIF in the OR planned for the AM. Pt received x1 dose of morphine for pain    7/17/18 - Patient seen and examined at bedside in PACU, s/p ORIF for trimalleolar fx. Patient is somnolent, no respiratory distress. Will f/u with Orthopedic team.    REVIEW OF SYSTEMS:  unable to assess 2/2 condition    VS  HR 85  /70  RR 15 satting 96% on suppO2 NC    I&O's Summary    17 Jul 2018 07:01  -  17 Jul 2018 18:36  --------------------------------------------------------  IN: 0 mL / OUT: 250 mL / NET: -250 mL        CAPILLARY BLOOD GLUCOSE          PHYSICAL EXAM:    Constitutional: NAD, somnolent+ well-developed  Neck: Soft and supple, No LAD, No JVD  Respiratory: Breath sounds are clear bilaterally, No wheezing, rales or rhonchi, SuppO2 on NC  Cardiovascular: S1 and S2, regular rate and rhythm, no Murmurs, gallops or rubs  Gastrointestinal: Bowel Sounds present, soft, nontender, nondistended, no guarding, no rebound  Extremities: No peripheral edema, LLE in ace bandages  Vascular: 2+ peripheral pulses, DP/PT, Radial, Carotid  Skin: No rashes    MEDICATIONS:  MEDICATIONS  (STANDING):  lactated ringers. 1000 milliLiter(s) (100 mL/Hr) IV Continuous <Continuous>      LABS: All Labs Reviewed:                        12.4   11.64 )-----------( 230      ( 17 Jul 2018 04:04 )             35.5     07-17    143  |  109<H>  |  17  ----------------------------<  112<H>  3.6   |  26  |  0.67    Ca    9.0      17 Jul 2018 04:17    TPro  7.7  /  Alb  3.9  /  TBili  0.4  /  DBili  x   /  AST  23  /  ALT  25  /  AlkPhos  116  07-16    PT/INR - ( 17 Jul 2018 04:04 )   PT: 13.7 sec;   INR: 1.26 ratio         PTT - ( 17 Jul 2018 04:04 )  PTT:35.1 sec  CARDIAC MARKERS ( 16 Jul 2018 07:25 )  <0.015 ng/mL / x     / 76 U/L / x     / x          Blood Culture:     RADIOLOGY/EKG:    DVT PPX:    ADVANCED DIRECTIVE:    DISPOSITION:
CHIEF COMPLAINT:  HPI:  Pt is a 62 yo, nonverbal, male with pertinent h/o severe Intellectual Disability, HTN, presenting with LLE deformity following a fall at group home. Per pt's aide who is present with pt, the fall was unwitnessed, pt was found lying at the bottom of ~8 steps staircase, unknown if there was any LOC. No known previous falls in the past. Per aide, pt is independent at the group home, walks unassisted with any device. Staff denies any recent illness or change in mental status. Pt is reported to be at his normal baseline.    7/18 Pt was seen and examined at bedside. Pt is nonverbal but awake. Pt did not seem to be in any apparent distress. The splint was present on the left leg.    SUBJECTIVE:     REVIEW OF SYSTEMS:  Unable to obtain due to patients intellectual disability    Vital Signs Last 24 Hrs  T(C): 36.3 (18 Jul 2018 17:01), Max: 36.9 (17 Jul 2018 18:20)  T(F): 97.4 (18 Jul 2018 17:01), Max: 98.4 (17 Jul 2018 18:20)  HR: 87 (18 Jul 2018 17:01) (56 - 95)  BP: 96/44 (18 Jul 2018 17:01) (94/57 - 125/55)  BP(mean): 3 (18 Jul 2018 17:01) (3 - 3)  RR: 17 (18 Jul 2018 17:01) (13 - 17)  SpO2: 97% (18 Jul 2018 17:01) (97% - 100%)    I&O's Summary    17 Jul 2018 07:01  -  18 Jul 2018 07:00  --------------------------------------------------------  IN: 2300 mL / OUT: 302 mL / NET: 1998 mL        CAPILLARY BLOOD GLUCOSE          PHYSICAL EXAM:  Constitutional: NAD, awake and alert  Respiratory: Breath sounds are clear bilaterally, No wheezing, rales or rhonchi  Cardiovascular: S1 and S2, regular rate and rhythm, no Murmurs, gallops or rubs  Gastrointestinal: Bowel Sounds present, soft, nontender, nondistended, no guarding, no rebound  Extremities: No peripheral edema  Vascular: 2+ peripheral pulses        MEDICATIONS:  MEDICATIONS  (STANDING):  aspirin 325 milliGRAM(s) Oral two times a day  atorvastatin 10 milliGRAM(s) Oral at bedtime  calcium carbonate 1250 mG  + Vitamin D (OsCal 500 + D) 1 Tablet(s) Oral daily  chlorhexidine 0.12% Liquid 15 milliLiter(s) Swish and Spit two times a day  docusate sodium 100 milliGRAM(s) Oral daily  fluticasone propionate 50 MICROgram(s)/spray Nasal Spray 1 Spray(s) Both Nostrils two times a day  fluvoxaMINE 25 milliGRAM(s) Oral two times a day  hydrochlorothiazide 12.5 milliGRAM(s) Oral daily  lidocaine   Patch 1 Patch Transdermal at bedtime  loratadine 10 milliGRAM(s) Oral daily  multivitamin 1 Tablet(s) Oral daily  niacin SR 1000 milliGRAM(s) Oral at bedtime  risperiDONE   Tablet 2 milliGRAM(s) Oral two times a day  tamsulosin 0.4 milliGRAM(s) Oral at bedtime  valsartan 80 milliGRAM(s) Oral daily      LABS: All Labs Reviewed:                        11.5   15.07 )-----------( 207      ( 18 Jul 2018 06:17 )             32.4     07-18    142  |  108  |  13  ----------------------------<  119<H>  3.6   |  23  |  0.76    Ca    9.0      18 Jul 2018 06:17      PT/INR - ( 17 Jul 2018 04:04 )   PT: 13.7 sec;   INR: 1.26 ratio         PTT - ( 17 Jul 2018 04:04 )  PTT:35.1 sec      Blood Culture:     RADIOLOGY/EKG:    < from: Xray Tibia + Fibula 2 Views, Left (07.16.18 @ 09:22) >  IMPRESSION:    Trimalleolar fracture with lateral tibiotalar dislocation.  Anatomic tibiotalar alignment on postreduction radiographs.
Orthopedics Post-op Check    This is a 64y/o Male s/p Left Ankle ORIF POD 0.  Pt seen at bedside on the floor, resting comfortably.    Vital Signs Last 24 Hrs  T(C): 36.8 (07-17-18 @ 20:30), Max: 36.9 (07-17-18 @ 18:20)  T(F): 98.2 (07-17-18 @ 20:30), Max: 98.4 (07-17-18 @ 18:20)  HR: 62 (07-17-18 @ 20:30) (62 - 90)  BP: 115/58 (07-17-18 @ 20:30) (103/67 - 134/65)  BP(mean): 65 (07-17-18 @ 11:18) (65 - 65)  RR: 15 (07-17-18 @ 20:30) (13 - 18)  SpO2: 99% (07-17-18 @ 20:30) (96% - 99%)                        12.4   11.64 )-----------( 230      ( 17 Jul 2018 04:04 )             35.5     17 Jul 2018 04:17    143    |  109    |  17     ----------------------------<  112    3.6     |  26     |  0.67     Ca    9.0        17 Jul 2018 04:17    TPro  7.7    /  Alb  3.9    /  TBili  0.4    /  DBili  x      /  AST  23     /  ALT  25     /  AlkPhos  116    16 Jul 2018 07:25    PT/INR - ( 17 Jul 2018 04:04 )   PT: 13.7 sec;   INR: 1.26 ratio         PTT - ( 17 Jul 2018 04:04 )  PTT:35.1 sec    Exam:  Exam limited by patient inability to follow commands.  Dressing/trilam splint clean and dry.  Pt not moving toes, pt does not respond to stimuli of toes, including painful stimuli.  Cap refill <2secs.  Foot is warm to touch   DP and PT pulses 2+.    A/P: 64y/o male s/p left ankle ORIF; sensation and motor function masked by block.  -Analgesia  -Ppx ABX  -DVT PE ppx  -OOB PT NWB LLE with trilam splint  -Montior NV status     Dr. Smith aware.
Patient is a 63M with severe MR who presents to the ED today for a c/o of L Ankle Pain and visible deformity. Patient's aid states that he was walking down a flight of stairs when he tripped and fell. Patient was found at the bottom of the stairs following his fall. Patient ambulates in group home without assistive devices. Patient is a poor historian secondary to mental retardation. Unable to assess full review of systems secondary to mental status. No known orthopedic history per aid and documents available from group home. No other orthopedic concerns at this time       PAST MEDICAL & SURGICAL HISTORY:  Mental retardation  HTN  HLD  Autism  Impulse control disorder  Schizophrenia  GERD  BPH  Mild Osteopenia    Home Medications:  Fluticasone  Risperidone  Tamulosin  Pravastatin  Fluvoxamine  Niacin  Valsartan  Loratadine  Calcium  ASA 81mg    Allergies  No Known Allergies    ROS: Noncontrib  FH: noncontrib    PHYSICAL EXAM:  Vital Signs Last 24 Hrs  T(C): 36.7 (16 Jul 2018 07:15), Max: 36.7 (16 Jul 2018 07:15)  T(F): 98 (16 Jul 2018 07:15), Max: 98 (16 Jul 2018 07:15)  HR: 83 (16 Jul 2018 07:15) (83 - 83)  BP: 127/96 (16 Jul 2018 07:15) (127/96 - 127/96)  RR: 16 (16 Jul 2018 07:15) (16 - 16)  SpO2: 96% (16 Jul 2018 07:15) (96% - 96%)    Gen: NAD; Resting comfortably  LLE: Gross deformity of ankle noted; 2cm ecchymosis over medial malleolus  Skin intact with tenting over medial malleolus; No erythema  SILT L3-S1  EHL/FHL/TA/GS intact  Full AROM Knee and Hip  -Log roll  +DP (doppler); No palpable DP/PT pulse; Foot cold to palpation  Compartments soft and compressible   No calf tenderness    Secondary Survey: No TTP over bony prominences, SILT, palpable pulses, full/painless range of motion, compartments soft     Imaging:  XR L Ankle: Closed Trimalleolar Ankle fracture/ dislocation with Lateral Talar displacement    Procedure: Closed Reduction of L Ankle  Patient was NVI preprocedure and postprocedure. Patient tolerated procedure well with no complications. Patient was placed in a well padded Trilam splint and post-reduction films were obtained which showed adequate alignment.
Pt S/E at bedside, no acute events overnight, pain controlled    Vital Signs Last 24 Hrs  T(C): 36.7 (17 Jul 2018 01:53), Max: 37 (16 Jul 2018 17:46)  T(F): 98 (17 Jul 2018 01:53), Max: 98.6 (16 Jul 2018 17:46)  HR: 76 (17 Jul 2018 01:53) (70 - 85)  BP: 134/65 (17 Jul 2018 01:53) (119/69 - 134/65)  BP(mean): --  RR: 18 (17 Jul 2018 01:53) (15 - 20)  SpO2: 99% (17 Jul 2018 01:53) (96% - 99%)    Gen: NAD    Left Lower Extremity:  Splint intact  +wiggles toes  responds to tactile stimuli   brisk CR  Compartments soft/no pain with passive stretch toes
Pt S/E at bedside, no acute events overnight, pain controlled    Vital Signs Last 24 Hrs  T(C): 36.8 (19 Jul 2018 05:58), Max: 36.9 (18 Jul 2018 11:23)  T(F): 98.2 (19 Jul 2018 05:58), Max: 98.4 (18 Jul 2018 11:23)  HR: 77 (19 Jul 2018 05:58) (74 - 95)  BP: 115/54 (19 Jul 2018 05:58) (94/57 - 115/54)  BP(mean): 3 (18 Jul 2018 17:01) (3 - 3)  RR: 16 (19 Jul 2018 05:58) (16 - 17)  SpO2: 93% (19 Jul 2018 05:58) (93% - 99%)    Gen: NAD    PE  unable to follow commands  Dressing/trilam splint clean and dry.  Pt not moving toes, pt does not respond to stimuli of toes, including painful stimuli.  Cap refill <2secs.  Foot is warm to touch   DP and PT pulses 2+.
Pt S/E at bedside, no acute events overnight. Unable to assess full ROS 2/2 to pt baseline mental status. Pt does not appear to be in pain at this time.     ICU Vital Signs Last 24 Hrs  T(C): 36.2 (18 Jul 2018 03:50), Max: 36.9 (17 Jul 2018 18:20)  T(F): 97.1 (18 Jul 2018 03:50), Max: 98.4 (17 Jul 2018 18:20)  HR: 57 (18 Jul 2018 03:50) (56 - 90)  BP: 123/59 (18 Jul 2018 03:50) (103/67 - 125/55)  BP(mean): 65 (17 Jul 2018 11:18) (65 - 65)  RR: 15 (18 Jul 2018 03:50) (13 - 18)  SpO2: 100% (18 Jul 2018 03:50) (96% - 100%)    Gen: NAD, AAOx3    Left Lower Extremity:  Dressing/splint clean, dry, intact  +EHL/FHL/TA/GS  SILT L3-S1  Compartments soft, anterior dressing windowed  No calf TTP B/L  cap refill brisk, including 2nd toe w/ ecchymosis noted
Pt has been seen and examined with FP resident, resident supervised agree with a/p       Patient is a 63y old  Male who presents with a chief complaint of Unwitnessed fall with left lower extremity deformity (17 Jul 2018 22:46)          PHYSICAL EXAM:  Vital Signs Last 24 Hrs  T(C): 36.9 (18 Jul 2018 11:23), Max: 36.9 (17 Jul 2018 18:20)  T(F): 98.4 (18 Jul 2018 11:23), Max: 98.4 (17 Jul 2018 18:20)  HR: 95 (18 Jul 2018 11:23) (56 - 95)  BP: 94/57 (18 Jul 2018 11:23) (94/57 - 125/55)  BP(mean): --  RR: 16 (18 Jul 2018 11:23) (13 - 17)  SpO2: 99% (18 Jul 2018 11:23) (97% - 100%)  general- comfortable   -rs-aeeb,cta  -cvs-s1s2 normal   -p/a-soft,bs+  -extremity- left leg elastic wrap present       A/P    #Left trimalleolar fracture s/p surgery    #d/c plan for tomorrow
Pt has been seen and examined with FP resident, resident supervised agree with a/p       Patient is a 63y old  Male who presents with a chief complaint of Unwitnessed fall with left lower extremity deformity (17 Jul 2018 22:46)      general- comfortable   -rs-aeeb,cta  -cvs-s1s2 normal   -p/a-soft,bs+  -extremity- left leg elastic wrap present       A/P    #Left trimalleolar fracture s/p surgery    #d/c today with further management as an outpt, time spent 65 minutes

## 2018-07-19 NOTE — PROGRESS NOTE ADULT - PROVIDER SPECIALTY LIST ADULT
Family Medicine
Hospitalist
Hospitalist
Orthopedics
Family Medicine

## 2018-07-20 RX ORDER — OXYCODONE HYDROCHLORIDE 5 MG/1
1 TABLET ORAL
Qty: 0 | Refills: 0 | COMMUNITY

## 2018-07-20 RX ORDER — OXYCODONE HYDROCHLORIDE 5 MG/1
1 TABLET ORAL
Qty: 30 | Refills: 0 | OUTPATIENT
Start: 2018-07-20

## 2018-07-23 PROBLEM — F79 UNSPECIFIED INTELLECTUAL DISABILITIES: Chronic | Status: ACTIVE | Noted: 2018-07-16

## 2018-07-25 DIAGNOSIS — F84.9 PERVASIVE DEVELOPMENTAL DISORDER, UNSPECIFIED: ICD-10-CM

## 2018-07-25 DIAGNOSIS — N40.0 BENIGN PROSTATIC HYPERPLASIA WITHOUT LOWER URINARY TRACT SYMPTOMS: ICD-10-CM

## 2018-07-25 DIAGNOSIS — F31.9 BIPOLAR DISORDER, UNSPECIFIED: ICD-10-CM

## 2018-07-25 DIAGNOSIS — F20.0 PARANOID SCHIZOPHRENIA: ICD-10-CM

## 2018-07-25 DIAGNOSIS — K21.9 GASTRO-ESOPHAGEAL REFLUX DISEASE WITHOUT ESOPHAGITIS: ICD-10-CM

## 2018-07-25 DIAGNOSIS — N40.1 BENIGN PROSTATIC HYPERPLASIA WITH LOWER URINARY TRACT SYMPTOMS: ICD-10-CM

## 2018-07-25 DIAGNOSIS — F72 SEVERE INTELLECTUAL DISABILITIES: ICD-10-CM

## 2018-07-25 DIAGNOSIS — S82.852A DISPLACED TRIMALLEOLAR FRACTURE OF LEFT LOWER LEG, INITIAL ENCOUNTER FOR CLOSED FRACTURE: ICD-10-CM

## 2018-07-25 DIAGNOSIS — E78.5 HYPERLIPIDEMIA, UNSPECIFIED: ICD-10-CM

## 2018-07-25 DIAGNOSIS — F84.0 AUTISTIC DISORDER: ICD-10-CM

## 2018-07-25 DIAGNOSIS — S93.05XA DISLOCATION OF LEFT ANKLE JOINT, INITIAL ENCOUNTER: ICD-10-CM

## 2018-07-25 DIAGNOSIS — I10 ESSENTIAL (PRIMARY) HYPERTENSION: ICD-10-CM
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
## 2018-07-25 DIAGNOSIS — M85.80 OTHER SPECIFIED DISORDERS OF BONE DENSITY AND STRUCTURE, UNSPECIFIED SITE: ICD-10-CM

## 2018-07-25 DIAGNOSIS — F63.9 IMPULSE DISORDER, UNSPECIFIED: ICD-10-CM

## 2018-07-30 ENCOUNTER — APPOINTMENT (OUTPATIENT)
Dept: ORTHOPEDIC SURGERY | Facility: CLINIC | Age: 63
End: 2018-07-30
Payer: MEDICARE

## 2018-07-30 DIAGNOSIS — Z78.9 OTHER SPECIFIED HEALTH STATUS: ICD-10-CM

## 2018-07-30 DIAGNOSIS — F79 UNSPECIFIED INTELLECTUAL DISABILITIES: ICD-10-CM

## 2018-07-30 PROCEDURE — 29515 APPLICATION SHORT LEG SPLINT: CPT | Mod: 58,LT

## 2018-07-30 PROCEDURE — 99024 POSTOP FOLLOW-UP VISIT: CPT

## 2018-08-06 ENCOUNTER — APPOINTMENT (OUTPATIENT)
Dept: ORTHOPEDIC SURGERY | Facility: CLINIC | Age: 63
End: 2018-08-06
Payer: COMMERCIAL

## 2018-08-06 PROCEDURE — 97760 ORTHOTIC MGMT&TRAING 1ST ENC: CPT

## 2018-08-06 PROCEDURE — 99024 POSTOP FOLLOW-UP VISIT: CPT

## 2018-08-06 PROCEDURE — 73610 X-RAY EXAM OF ANKLE: CPT | Mod: LT

## 2018-08-14 ENCOUNTER — APPOINTMENT (OUTPATIENT)
Dept: CARDIOLOGY | Facility: CLINIC | Age: 63
End: 2018-08-14

## 2018-08-14 ENCOUNTER — APPOINTMENT (OUTPATIENT)
Dept: ORTHOPEDIC SURGERY | Facility: CLINIC | Age: 63
End: 2018-08-14

## 2018-08-17 ENCOUNTER — APPOINTMENT (OUTPATIENT)
Dept: ORTHOPEDIC SURGERY | Facility: CLINIC | Age: 63
End: 2018-08-17
Payer: MEDICARE

## 2018-08-17 PROCEDURE — 99024 POSTOP FOLLOW-UP VISIT: CPT

## 2018-08-17 PROCEDURE — 73610 X-RAY EXAM OF ANKLE: CPT | Mod: LT

## 2018-09-13 NOTE — ED ADULT NURSE NOTE - CAS DISCH CONDITION
[facility]        NAME: Seamus Avila is a 56 y o  female  : 1958    MRN: [id number]  DATE: 2018  TIME: 10:56 AM    Assessment and Plan   Herpes zoster without complication [F02 9]  1  Herpes zoster without complication  valACYclovir (VALTREX) 1,000 mg tablet         Patient Instructions   Maintain good hygiene of site, wash and dry well  Utilize hypo-allergenic soap  ie Dove or Neutrogena  Complete course of antivirals as directed  May utilize tylenol or ibuprofen for discomfort  Follow up with PCP in 3-5 days  Proceed to  ER if symptoms worsen  Chief Complaint     Chief Complaint   Patient presents with    Rash     Patient relates noticed a localized rash to right abdomen on   + painful and itchy  Denies fever  No new medications, soaps or detergents  History of Present Illness       Patient presents with rash on R lower abdomen which started this past   Denies fevers, chills, N/V/D  Noted to be painful  Has been applying topical bacitracin with no improvement  No changes in soaps, lotions, detergents, meds  Review of Systems   Review of Systems   Constitutional: Negative for chills, fatigue and fever  Respiratory: Negative  Cardiovascular: Negative  Gastrointestinal: Negative  Musculoskeletal: Negative  Skin: Positive for rash           Current Medications       Current Outpatient Prescriptions:     Probiotic Product (PROBIOTIC-10 PO), Take by mouth, Disp: , Rfl:     valACYclovir (VALTREX) 1,000 mg tablet, Take 1 tablet (1,000 mg total) by mouth 3 (three) times a day for 7 days, Disp: 21 tablet, Rfl: 0    Current Allergies     Allergies as of 2018 - Reviewed 2018   Allergen Reaction Noted    Ciprofloxacin Nausea Only 2018    Flagyl [metronidazole] Nausea Only 2018    Flexeril [cyclobenzaprine]  2017    Flu virus vaccine  2014    Morphine  2017    Onion Hives 2015 The following portions of the patient's history were reviewed and updated as appropriate: allergies, current medications, past family history, past medical history, past social history, past surgical history and problem list      Past Medical History:   Diagnosis Date    C  difficile diarrhea     resolved 06 jun 2017    Elevated CEA     resolved 06 sep 2017    GERD (gastroesophageal reflux disease)     Reactive airway disease     RESOLVED 06 NOV 2017    Rotator cuff tear     Sleep disorder     RESOLVED 06 NOV 2017       Past Surgical History:   Procedure Laterality Date    BREAST BIOPSY      COLONOSCOPY      DILATION AND CURETTAGE OF UTERUS      ROTATOR CUFF REPAIR      WRIST FRACTURE SURGERY         Family History   Problem Relation Age of Onset    Heart disease Mother         CARDIAC DISORDER    Heart disease Father         CARDIAC DISORDER    Heart failure Father         CONGESTIVE HEART FAILURE    Stroke Father          Medications have been verified  Objective   /74   Pulse 81   Temp 98 7 °F (37 1 °C) (Tympanic)   Resp 16   Ht 5' (1 524 m)   Wt 57 6 kg (127 lb)   SpO2 98%   BMI 24 80 kg/m²        Physical Exam     Physical Exam   Constitutional: She is oriented to person, place, and time  She appears well-developed and well-nourished  No distress  HENT:   Head: Normocephalic and atraumatic  Eyes: Pupils are equal, round, and reactive to light  Cardiovascular: Normal rate, regular rhythm, normal heart sounds and intact distal pulses  Pulmonary/Chest: Effort normal and breath sounds normal  No respiratory distress  She has no wheezes  Musculoskeletal: Normal range of motion  Neurological: She is alert and oriented to person, place, and time  Skin: Skin is warm and dry  Rash noted  She is not diaphoretic  Psychiatric: She has a normal mood and affect  Nursing note and vitals reviewed  Stable

## 2018-09-24 ENCOUNTER — APPOINTMENT (OUTPATIENT)
Dept: ORTHOPEDIC SURGERY | Facility: CLINIC | Age: 63
End: 2018-09-24
Payer: COMMERCIAL

## 2018-09-24 PROCEDURE — 99024 POSTOP FOLLOW-UP VISIT: CPT

## 2018-09-24 PROCEDURE — 73610 X-RAY EXAM OF ANKLE: CPT | Mod: LT

## 2018-10-12 ENCOUNTER — APPOINTMENT (OUTPATIENT)
Dept: CARDIOLOGY | Facility: CLINIC | Age: 63
End: 2018-10-12

## 2018-10-26 RX ORDER — OXYCODONE HYDROCHLORIDE 5 MG/1
1 TABLET ORAL
Qty: 30 | Refills: 0 | OUTPATIENT
Start: 2018-10-26

## 2018-10-26 RX ORDER — FLUVOXAMINE MALEATE 25 MG/1
0.5 TABLET ORAL
Qty: 60 | Refills: 0 | OUTPATIENT
Start: 2018-10-26

## 2018-10-26 RX ORDER — ACETAMINOPHEN 500 MG
2 TABLET ORAL
Qty: 90 | Refills: 0 | OUTPATIENT
Start: 2018-10-26

## 2018-10-26 RX ORDER — ASPIRIN/CALCIUM CARB/MAGNESIUM 324 MG
1 TABLET ORAL
Qty: 30 | Refills: 0 | OUTPATIENT
Start: 2018-10-26

## 2018-10-26 RX ORDER — TAMSULOSIN HYDROCHLORIDE 0.4 MG/1
1 CAPSULE ORAL
Qty: 30 | Refills: 0 | OUTPATIENT
Start: 2018-10-26

## 2018-10-26 RX ORDER — FLUVOXAMINE MALEATE 25 MG/1
0.5 TABLET ORAL
Qty: 0 | Refills: 0 | COMMUNITY

## 2018-10-26 RX ORDER — ASPIRIN/CALCIUM CARB/MAGNESIUM 324 MG
1 TABLET ORAL
Qty: 0 | Refills: 0 | COMMUNITY

## 2018-10-26 RX ORDER — RISPERIDONE 4 MG/1
1 TABLET ORAL
Qty: 60 | Refills: 0 | OUTPATIENT
Start: 2018-10-26

## 2018-10-29 ENCOUNTER — APPOINTMENT (OUTPATIENT)
Dept: CARDIOLOGY | Facility: CLINIC | Age: 63
End: 2018-10-29
Payer: MEDICARE

## 2018-10-29 PROCEDURE — 93306 TTE W/DOPPLER COMPLETE: CPT

## 2018-11-07 ENCOUNTER — APPOINTMENT (OUTPATIENT)
Dept: CARDIOLOGY | Facility: CLINIC | Age: 63
End: 2018-11-07
Payer: MEDICARE

## 2018-11-07 ENCOUNTER — NON-APPOINTMENT (OUTPATIENT)
Age: 63
End: 2018-11-07

## 2018-11-07 VITALS — HEART RATE: 58 BPM | SYSTOLIC BLOOD PRESSURE: 132 MMHG | OXYGEN SATURATION: 97 % | DIASTOLIC BLOOD PRESSURE: 78 MMHG

## 2018-11-07 VITALS — WEIGHT: 187 LBS | BODY MASS INDEX: 27.7 KG/M2 | HEIGHT: 69 IN

## 2018-11-07 PROCEDURE — 93000 ELECTROCARDIOGRAM COMPLETE: CPT

## 2018-11-07 PROCEDURE — 99214 OFFICE O/P EST MOD 30 MIN: CPT | Mod: 25

## 2018-11-07 NOTE — DISCUSSION/SUMMARY
[Hyperlipidemia] : hyperlipidemia [Diet Modification] : diet modification [Exercise] : exercise [Hypertension] : hypertension [Stable] : stable [None] : none [Exercise Regimen] : an exercise regimen [Weight Loss] : weight loss [Sodium Restriction] : sodium restriction

## 2018-11-07 NOTE — PHYSICAL EXAM
[General Appearance - Well Developed] : well developed [Normal Conjunctiva] : the conjunctiva exhibited no abnormalities [Normal Oral Mucosa] : normal oral mucosa [] : no respiratory distress [Respiration, Rhythm And Depth] : normal respiratory rhythm and effort [Exaggerated Use Of Accessory Muscles For Inspiration] : no accessory muscle use [Auscultation Breath Sounds / Voice Sounds] : lungs were clear to auscultation bilaterally [Chest Palpation] : palpation of the chest revealed no abnormalities [Lungs Percussion] : the lungs were normal to percussion [Heart Rate And Rhythm] : heart rate and rhythm were normal [Heart Sounds] : normal S1 and S2 [Arterial Pulses Normal] : the arterial pulses were normal [Edema] : no peripheral edema present [Systolic grade ___/6] : A grade [unfilled]/6 systolic murmur was heard. [Bowel Sounds] : normal bowel sounds [Abdomen Soft] : soft [Abnormal Walk] : normal gait [Nail Clubbing] : no clubbing of the fingernails [FreeTextEntry1] : multiple scratch marks on both forearms  , right ankle swelling  [No Anxiety] : not feeling anxious [Normal Appearance] : was normal in appearance

## 2018-11-07 NOTE — HISTORY OF PRESENT ILLNESS
[FreeTextEntry1] : Patient  with severe autism,hypertension,hyperlipidemia who came for follow up .  patient is non verbal \par patient had right ankle fracture 3 months ago , was under rehab , patient does have left leg swelling ,  Patient had echo showed normal study \par \par had recent blood  work 6/2018, normal lipid profile. HDL 52  LDL 67     TG 79\par \par  \par his blood pressure is controlled ,\par \par

## 2018-11-07 NOTE — REASON FOR VISIT
[Follow-Up - Clinic] : a clinic follow-up of [Abnormal ECG] : an abnormal ECG [Hyperlipidemia] : hyperlipidemia [Hypertension] : hypertension [Medication Management] : Medication management

## 2018-11-12 ENCOUNTER — INPATIENT (INPATIENT)
Facility: HOSPITAL | Age: 63
LOS: 1 days | Discharge: NURSING, REHAB OR LONG-TERM CARE | DRG: 300 | End: 2018-11-14
Attending: FAMILY MEDICINE | Admitting: FAMILY MEDICINE
Payer: MEDICARE

## 2018-11-12 VITALS
OXYGEN SATURATION: 97 % | SYSTOLIC BLOOD PRESSURE: 160 MMHG | RESPIRATION RATE: 18 BRPM | HEIGHT: 68 IN | HEART RATE: 86 BPM | DIASTOLIC BLOOD PRESSURE: 91 MMHG | TEMPERATURE: 97 F | WEIGHT: 194.01 LBS

## 2018-11-12 DIAGNOSIS — Z29.9 ENCOUNTER FOR PROPHYLACTIC MEASURES, UNSPECIFIED: ICD-10-CM

## 2018-11-12 DIAGNOSIS — I10 ESSENTIAL (PRIMARY) HYPERTENSION: ICD-10-CM

## 2018-11-12 DIAGNOSIS — F31.9 BIPOLAR DISORDER, UNSPECIFIED: ICD-10-CM

## 2018-11-12 DIAGNOSIS — I82.402 ACUTE EMBOLISM AND THROMBOSIS OF UNSPECIFIED DEEP VEINS OF LEFT LOWER EXTREMITY: ICD-10-CM

## 2018-11-12 DIAGNOSIS — S82.892A OTHER FRACTURE OF LEFT LOWER LEG, INITIAL ENCOUNTER FOR CLOSED FRACTURE: Chronic | ICD-10-CM

## 2018-11-12 DIAGNOSIS — I80.00 PHLEBITIS AND THROMBOPHLEBITIS OF SUPERFICIAL VESSELS OF UNSPECIFIED LOWER EXTREMITY: ICD-10-CM

## 2018-11-12 DIAGNOSIS — E78.5 HYPERLIPIDEMIA, UNSPECIFIED: ICD-10-CM

## 2018-11-12 LAB
ALBUMIN SERPL ELPH-MCNC: 3.3 G/DL — SIGNIFICANT CHANGE UP (ref 3.3–5)
ALP SERPL-CCNC: 150 U/L — HIGH (ref 30–120)
ALT FLD-CCNC: 21 U/L DA — SIGNIFICANT CHANGE UP (ref 10–60)
ANION GAP SERPL CALC-SCNC: 6 MMOL/L — SIGNIFICANT CHANGE UP (ref 5–17)
APTT BLD: 42.8 SEC — HIGH (ref 28.5–37)
AST SERPL-CCNC: 16 U/L — SIGNIFICANT CHANGE UP (ref 10–40)
BASOPHILS # BLD AUTO: 0.02 K/UL — SIGNIFICANT CHANGE UP (ref 0–0.2)
BASOPHILS NFR BLD AUTO: 0.3 % — SIGNIFICANT CHANGE UP (ref 0–2)
BILIRUB SERPL-MCNC: 0.2 MG/DL — SIGNIFICANT CHANGE UP (ref 0.2–1.2)
BUN SERPL-MCNC: 10 MG/DL — SIGNIFICANT CHANGE UP (ref 7–23)
CALCIUM SERPL-MCNC: 9.5 MG/DL — SIGNIFICANT CHANGE UP (ref 8.4–10.5)
CHLORIDE SERPL-SCNC: 107 MMOL/L — SIGNIFICANT CHANGE UP (ref 96–108)
CO2 SERPL-SCNC: 29 MMOL/L — SIGNIFICANT CHANGE UP (ref 22–31)
CREAT SERPL-MCNC: 0.7 MG/DL — SIGNIFICANT CHANGE UP (ref 0.5–1.3)
EOSINOPHIL # BLD AUTO: 0.31 K/UL — SIGNIFICANT CHANGE UP (ref 0–0.5)
EOSINOPHIL NFR BLD AUTO: 4 % — SIGNIFICANT CHANGE UP (ref 0–6)
GLUCOSE SERPL-MCNC: 106 MG/DL — HIGH (ref 70–99)
HCT VFR BLD CALC: 34.2 % — LOW (ref 39–50)
HGB BLD-MCNC: 11.3 G/DL — LOW (ref 13–17)
IMM GRANULOCYTES NFR BLD AUTO: 0.3 % — SIGNIFICANT CHANGE UP (ref 0–1.5)
INR BLD: 1.25 RATIO — HIGH (ref 0.88–1.16)
LYMPHOCYTES # BLD AUTO: 1.93 K/UL — SIGNIFICANT CHANGE UP (ref 1–3.3)
LYMPHOCYTES # BLD AUTO: 25 % — SIGNIFICANT CHANGE UP (ref 13–44)
MCHC RBC-ENTMCNC: 27.8 PG — SIGNIFICANT CHANGE UP (ref 27–34)
MCHC RBC-ENTMCNC: 33 GM/DL — SIGNIFICANT CHANGE UP (ref 32–36)
MCV RBC AUTO: 84 FL — SIGNIFICANT CHANGE UP (ref 80–100)
MONOCYTES # BLD AUTO: 0.79 K/UL — SIGNIFICANT CHANGE UP (ref 0–0.9)
MONOCYTES NFR BLD AUTO: 10.2 % — SIGNIFICANT CHANGE UP (ref 2–14)
NEUTROPHILS # BLD AUTO: 4.65 K/UL — SIGNIFICANT CHANGE UP (ref 1.8–7.4)
NEUTROPHILS NFR BLD AUTO: 60.2 % — SIGNIFICANT CHANGE UP (ref 43–77)
NRBC # BLD: 0 /100 WBCS — SIGNIFICANT CHANGE UP (ref 0–0)
PLATELET # BLD AUTO: 263 K/UL — SIGNIFICANT CHANGE UP (ref 150–400)
POTASSIUM SERPL-MCNC: 4.2 MMOL/L — SIGNIFICANT CHANGE UP (ref 3.5–5.3)
POTASSIUM SERPL-SCNC: 4.2 MMOL/L — SIGNIFICANT CHANGE UP (ref 3.5–5.3)
PROT SERPL-MCNC: 6.9 G/DL — SIGNIFICANT CHANGE UP (ref 6–8.3)
PROTHROM AB SERPL-ACNC: 13.7 SEC — HIGH (ref 10–12.9)
RAPID RVP RESULT: SIGNIFICANT CHANGE UP
RBC # BLD: 4.07 M/UL — LOW (ref 4.2–5.8)
RBC # FLD: 13.1 % — SIGNIFICANT CHANGE UP (ref 10.3–14.5)
SODIUM SERPL-SCNC: 142 MMOL/L — SIGNIFICANT CHANGE UP (ref 135–145)
WBC # BLD: 7.72 K/UL — SIGNIFICANT CHANGE UP (ref 3.8–10.5)
WBC # FLD AUTO: 7.72 K/UL — SIGNIFICANT CHANGE UP (ref 3.8–10.5)

## 2018-11-12 PROCEDURE — 99223 1ST HOSP IP/OBS HIGH 75: CPT | Mod: AI

## 2018-11-12 PROCEDURE — 93010 ELECTROCARDIOGRAM REPORT: CPT

## 2018-11-12 PROCEDURE — 99284 EMERGENCY DEPT VISIT MOD MDM: CPT

## 2018-11-12 PROCEDURE — 71045 X-RAY EXAM CHEST 1 VIEW: CPT | Mod: 26

## 2018-11-12 RX ORDER — ASPIRIN/CALCIUM CARB/MAGNESIUM 324 MG
325 TABLET ORAL DAILY
Qty: 0 | Refills: 0 | Status: DISCONTINUED | OUTPATIENT
Start: 2018-11-12 | End: 2018-11-14

## 2018-11-12 RX ORDER — ATORVASTATIN CALCIUM 80 MG/1
10 TABLET, FILM COATED ORAL AT BEDTIME
Qty: 0 | Refills: 0 | Status: DISCONTINUED | OUTPATIENT
Start: 2018-11-12 | End: 2018-11-14

## 2018-11-12 RX ORDER — RISPERIDONE 4 MG/1
1 TABLET ORAL
Qty: 0 | Refills: 0 | COMMUNITY

## 2018-11-12 RX ORDER — LORATADINE 10 MG/1
10 TABLET ORAL DAILY
Qty: 0 | Refills: 0 | Status: DISCONTINUED | OUTPATIENT
Start: 2018-11-12 | End: 2018-11-14

## 2018-11-12 RX ORDER — ASPIRIN/CALCIUM CARB/MAGNESIUM 324 MG
1 TABLET ORAL
Qty: 0 | Refills: 0 | COMMUNITY

## 2018-11-12 RX ORDER — FLUTICASONE PROPIONATE 50 MCG
1 SPRAY, SUSPENSION NASAL
Qty: 0 | Refills: 0 | Status: DISCONTINUED | OUTPATIENT
Start: 2018-11-12 | End: 2018-11-14

## 2018-11-12 RX ORDER — ENOXAPARIN SODIUM 100 MG/ML
90 INJECTION SUBCUTANEOUS
Qty: 0 | Refills: 0 | Status: DISCONTINUED | OUTPATIENT
Start: 2018-11-13 | End: 2018-11-14

## 2018-11-12 RX ORDER — FLUVOXAMINE MALEATE 25 MG/1
1 TABLET ORAL
Qty: 0 | Refills: 0 | COMMUNITY

## 2018-11-12 RX ORDER — TAMSULOSIN HYDROCHLORIDE 0.4 MG/1
1 CAPSULE ORAL
Qty: 0 | Refills: 0 | COMMUNITY

## 2018-11-12 RX ORDER — CEPHALEXIN 500 MG
500 CAPSULE ORAL EVERY 12 HOURS
Qty: 0 | Refills: 0 | Status: DISCONTINUED | OUTPATIENT
Start: 2018-11-12 | End: 2018-11-14

## 2018-11-12 RX ORDER — FLUVOXAMINE MALEATE 25 MG/1
50 TABLET ORAL
Qty: 0 | Refills: 0 | Status: DISCONTINUED | OUTPATIENT
Start: 2018-11-12 | End: 2018-11-14

## 2018-11-12 RX ORDER — RISPERIDONE 4 MG/1
2 TABLET ORAL
Qty: 0 | Refills: 0 | Status: DISCONTINUED | OUTPATIENT
Start: 2018-11-12 | End: 2018-11-14

## 2018-11-12 RX ORDER — TAMSULOSIN HYDROCHLORIDE 0.4 MG/1
0.4 CAPSULE ORAL AT BEDTIME
Qty: 0 | Refills: 0 | Status: DISCONTINUED | OUTPATIENT
Start: 2018-11-12 | End: 2018-11-14

## 2018-11-12 RX ORDER — ENOXAPARIN SODIUM 100 MG/ML
90 INJECTION SUBCUTANEOUS ONCE
Qty: 0 | Refills: 0 | Status: COMPLETED | OUTPATIENT
Start: 2018-11-12 | End: 2018-11-12

## 2018-11-12 RX ORDER — NIACIN 50 MG
1000 TABLET ORAL AT BEDTIME
Qty: 0 | Refills: 0 | Status: DISCONTINUED | OUTPATIENT
Start: 2018-11-12 | End: 2018-11-14

## 2018-11-12 RX ORDER — CHLORHEXIDINE GLUCONATE 213 G/1000ML
15 SOLUTION TOPICAL
Qty: 0 | Refills: 0 | Status: DISCONTINUED | OUTPATIENT
Start: 2018-11-12 | End: 2018-11-14

## 2018-11-12 RX ORDER — FLUTICASONE PROPIONATE 220 MCG
1 AEROSOL WITH ADAPTER (GRAM) INHALATION
Qty: 0 | Refills: 0 | COMMUNITY

## 2018-11-12 RX ADMIN — TAMSULOSIN HYDROCHLORIDE 0.4 MILLIGRAM(S): 0.4 CAPSULE ORAL at 21:39

## 2018-11-12 RX ADMIN — ATORVASTATIN CALCIUM 10 MILLIGRAM(S): 80 TABLET, FILM COATED ORAL at 21:39

## 2018-11-12 RX ADMIN — ENOXAPARIN SODIUM 90 MILLIGRAM(S): 100 INJECTION SUBCUTANEOUS at 17:46

## 2018-11-12 RX ADMIN — Medication 1000 MILLIGRAM(S): at 21:39

## 2018-11-12 NOTE — H&P ADULT - HISTORY OF PRESENT ILLNESS
***Patient has profound MR and is non-verbal.  HPI and ROS extremely limited and collateral information obtained via charts and notes***    59M with profound MR, HTN, HLD, BPH, osteoporosis who presents with left leg swelling.  Patient had surgery in 7/2018 for a left medial malleolus fracture s/p fall.  Patient started to develop left leg swelling though unclear of start date.  Patient went to see his cardiologist today and was sent for an outpatient venous doppler and was found to have a left sided lower extremity DVT from mid thigh to calf involving mid and distal femoral vein, popliteal and visualized portions of the tibial veins in the calf.  Per staff, patient did not complain of any pain.  When asked, patient cannot verbalize but did not express any pain.  In the ED, patient was started on lovenox 90mg for the DVT.

## 2018-11-12 NOTE — H&P ADULT - NSHPLABSRESULTS_GEN_ALL_CORE
LABS:                        11.3<L>  7.72  )-----------( 263      ( 12 Nov 2018 16:40 )             34.2<L>    142    |  107    |  10     ----------------------------<  106<H>    12 Nov 2018 16:40  4.2     |  29     |  0.70         Ca 9.5           12 Nov 2018 16:40        TPro  6.9    /  Alb  3.3    /  TBili  0.2    /  DBili  x      /  AST  16     /  ALT  21     /  AlkPhos  150<H>  12 Nov 2018 16:40    PT/INR - ( 12 Nov 2018 16:40 )   PT: 13.7<H>;   INR: 1.25<H>         PTT - ( 12 Nov 2018 16:40 )  PTT:42.8<H>    CXR  < from: Xray Chest 1 View- PORTABLE-Urgent (11.12.18 @ 16:34) >    Portable AP radiograph of the chest performed and compared to 7/16/2018.    The heart is not enlarged. The trachea is midline. There is no infiltrate   or pleural effusion. The osseous structures are intact.    Impression: No active pulmonary disease.    < end of copied text >

## 2018-11-12 NOTE — ED ADULT TRIAGE NOTE - CHIEF COMPLAINT QUOTE
Per staff from patient's Group Home, the patient has a blood clot left leg noted today in outpatinet study. Patient with recent history of Fx left foot with surgical repair and rehab. Patient is developmentally disabled and nonverbal.

## 2018-11-12 NOTE — ED ADULT NURSE NOTE - CAS EDN INTEG ASSESS
How Severe Is Your Skin Lesion?: mild
Has Your Skin Lesion Been Treated?: not been treated
Is This A New Presentation, Or A Follow-Up?: Skin Lesion
WDL

## 2018-11-12 NOTE — H&P ADULT - PROBLEM SELECTOR PLAN 4
- patient has a diagnosis of bipolar, OCD, schizophrenia   - for now, will continue fluvoxamine and risperidone

## 2018-11-12 NOTE — H&P ADULT - PROBLEM SELECTOR PLAN 1
- admitted to medicine  - continue with lovenox 90mg BID  - consider switching to a NOAC  - no indication for CTA at this time (though treatment for a PE would be the same)  - will continue and finish current regiment of kelfex 500mg BID (did 11 out of 14 days already, will continue for 3 more days) -> no indication to escalate abx

## 2018-11-12 NOTE — ED ADULT NURSE NOTE - OBJECTIVE STATEMENT
Patient presents to ED, awake and alert, non-verbal at baseline, s/p discovery of blood clot to left leg after ultrasound study completed today. Patient had foot surgery 3 months ago, completed rehabilitation and has had no complications. As per patient's aide, no facial grimacing or verbal indicators of pain have been noted, no fever, chills, shortness of breath have been noted. Patient moves extremities freely and no distress is noted. Healed surgical scar noted to left foot.

## 2018-11-12 NOTE — H&P ADULT - PROBLEM SELECTOR PLAN 5
IMPROVE VTE Individual Risk Assessment          RISK                                                          Points  [  ] Previous VTE                                                 3  [  ] Thrombophilia                                              2  [  ] Lower limb paralysis                                    2        (unable to hold up >15 seconds)    [  ] Current Cancer                                             2         (within 6 months)  [  ] Immobilization > 24 hrs                              1  [  ] ICU/CCU stay > 24 hours                            1  [X] Age > 60                                                        1    IMPROVE VTE Score  1    - already was diagnosed with a DVT -> on lovenox treatment dose

## 2018-11-12 NOTE — ED PROVIDER NOTE - CARDIAC PEDAL EDEMA
left leg calf region is swollen and mildly tender to palpation. mild erythematous skin on the left lower leg.

## 2018-11-12 NOTE — PATIENT PROFILE ADULT - FALL HARM RISK
other/coagulation(Bleeding disorder R/T clinical cond/anti-coags)/bones(Osteoporosis,prev fx,steroid use,metastatic bone ca)

## 2018-11-12 NOTE — H&P ADULT - NSHPPHYSICALEXAM_GEN_ALL_CORE
PHYSICAL EXAM:  Vital Signs Last 24 Hrs  T(C): 36.1 (12 Nov 2018 15:47), Max: 36.1 (12 Nov 2018 15:47)  T(F): 97 (12 Nov 2018 15:47), Max: 97 (12 Nov 2018 15:47)  HR: 86 (12 Nov 2018 15:47) (86 - 86)  BP: 160/91 (12 Nov 2018 15:47) (160/91 - 160/91)  BP(mean): --  RR: 18 (12 Nov 2018 15:47) (18 - 18)  SpO2: 97% (12 Nov 2018 15:47) (97% - 97%)    GENERAL:     NAD, well-groomed, well-developed  HEAD:     atraumatic, normocephalic  EYES:     EOMI, conjunctiva and sclera clear  ENMT:     no tonsillar erythema or exudates or enlargement, no oral lesions, moist mucous membranes, good dentition  NECK:     supple, no JVD  RESPIRATORY:     clear to auscultation bilaterally, no rales or rhonchi or wheezing or rubs  CARDIOVASCULAR:     regular rate and rhythm, no murmurs or rubs or gallops, 2+ peripheral pulses  GASTROINTESTINAL:     soft, nontender, nondistended, no hepatosplenomegaly palpated, bowel sounds present  EXTREMITIES:     no clubbing or cyanosis or edema  MUSCULOSKELETAL:     left lower extremity swelling up to mid thigh, slightly warmer than right, no gross deformities  NERVOUS SYSTEM:     moving all 4s  SKIN:     no rashes or lesions  PSYCH:     awake, non-verbal, grunts and mumbles

## 2018-11-12 NOTE — H&P ADULT - ASSESSMENT
59M with profound MR, HTN, HLD, BPH, osteoporosis who presents with left leg swelling, found to have left leg DVT.  Patient with stable vital signs and no visualized complaints of chest pain or difficulty breathing to suspect PE at this time.

## 2018-11-12 NOTE — ED PROVIDER NOTE - OBJECTIVE STATEMENT
62 y/o M pt with hx of HTN, developmental delay and non-verbal at baseline from [facility] presents to the ED c/o blood clot in left leg as per staff. Pt has a hx of fracture of left foot with surgical repairs completed in July 2018. He was seen at his cardiologist office today and was sent for an outpatient venous doppler study to r/o DVT due to a swollen left leg. The reports of the doppler study are mentioned below, states that a DVT was present. As per staff, pt did not complain of pain in the leg.           sukhi impression- Left sided lower extremity DVT from mid thigh to calf involving mid and distal femoral vein, popliteal and visualized posterior tibial veins in the calf. (11/12/18)    bella-antwon

## 2018-11-13 LAB
ALBUMIN SERPL ELPH-MCNC: 3.2 G/DL — LOW (ref 3.3–5)
ALP SERPL-CCNC: 149 U/L — HIGH (ref 30–120)
ALT FLD-CCNC: 21 U/L DA — SIGNIFICANT CHANGE UP (ref 10–60)
ANION GAP SERPL CALC-SCNC: 7 MMOL/L — SIGNIFICANT CHANGE UP (ref 5–17)
APTT BLD: 62.9 SEC — HIGH (ref 27.5–36.3)
AST SERPL-CCNC: 17 U/L — SIGNIFICANT CHANGE UP (ref 10–40)
BASOPHILS # BLD AUTO: 0.04 K/UL — SIGNIFICANT CHANGE UP (ref 0–0.2)
BASOPHILS NFR BLD AUTO: 0.6 % — SIGNIFICANT CHANGE UP (ref 0–2)
BILIRUB SERPL-MCNC: 0.3 MG/DL — SIGNIFICANT CHANGE UP (ref 0.2–1.2)
BUN SERPL-MCNC: 6 MG/DL — LOW (ref 7–23)
CALCIUM SERPL-MCNC: 9.5 MG/DL — SIGNIFICANT CHANGE UP (ref 8.4–10.5)
CHLORIDE SERPL-SCNC: 108 MMOL/L — SIGNIFICANT CHANGE UP (ref 96–108)
CO2 SERPL-SCNC: 28 MMOL/L — SIGNIFICANT CHANGE UP (ref 22–31)
CREAT SERPL-MCNC: 0.69 MG/DL — SIGNIFICANT CHANGE UP (ref 0.5–1.3)
EOSINOPHIL # BLD AUTO: 0.21 K/UL — SIGNIFICANT CHANGE UP (ref 0–0.5)
EOSINOPHIL NFR BLD AUTO: 3.1 % — SIGNIFICANT CHANGE UP (ref 0–6)
GLUCOSE SERPL-MCNC: 91 MG/DL — SIGNIFICANT CHANGE UP (ref 70–99)
HCT VFR BLD CALC: 34 % — LOW (ref 39–50)
HGB BLD-MCNC: 11.2 G/DL — LOW (ref 13–17)
IMM GRANULOCYTES NFR BLD AUTO: 0.1 % — SIGNIFICANT CHANGE UP (ref 0–1.5)
INR BLD: 1.25 RATIO — HIGH (ref 0.88–1.16)
LYMPHOCYTES # BLD AUTO: 1.82 K/UL — SIGNIFICANT CHANGE UP (ref 1–3.3)
LYMPHOCYTES # BLD AUTO: 26.9 % — SIGNIFICANT CHANGE UP (ref 13–44)
MAGNESIUM SERPL-MCNC: 2.2 MG/DL — SIGNIFICANT CHANGE UP (ref 1.6–2.6)
MCHC RBC-ENTMCNC: 27.5 PG — SIGNIFICANT CHANGE UP (ref 27–34)
MCHC RBC-ENTMCNC: 32.9 GM/DL — SIGNIFICANT CHANGE UP (ref 32–36)
MCV RBC AUTO: 83.3 FL — SIGNIFICANT CHANGE UP (ref 80–100)
MONOCYTES # BLD AUTO: 0.61 K/UL — SIGNIFICANT CHANGE UP (ref 0–0.9)
MONOCYTES NFR BLD AUTO: 9 % — SIGNIFICANT CHANGE UP (ref 2–14)
MRSA PCR RESULT.: DETECTED
NEUTROPHILS # BLD AUTO: 4.07 K/UL — SIGNIFICANT CHANGE UP (ref 1.8–7.4)
NEUTROPHILS NFR BLD AUTO: 60.3 % — SIGNIFICANT CHANGE UP (ref 43–77)
PHOSPHATE SERPL-MCNC: 3.3 MG/DL — SIGNIFICANT CHANGE UP (ref 2.5–4.5)
PLATELET # BLD AUTO: 291 K/UL — SIGNIFICANT CHANGE UP (ref 150–400)
POTASSIUM SERPL-MCNC: 3.9 MMOL/L — SIGNIFICANT CHANGE UP (ref 3.5–5.3)
POTASSIUM SERPL-SCNC: 3.9 MMOL/L — SIGNIFICANT CHANGE UP (ref 3.5–5.3)
PROT SERPL-MCNC: 6.8 G/DL — SIGNIFICANT CHANGE UP (ref 6–8.3)
PROTHROM AB SERPL-ACNC: 13.7 SEC — HIGH (ref 10–12.9)
RBC # BLD: 4.08 M/UL — LOW (ref 4.2–5.8)
RBC # FLD: 13.1 % — SIGNIFICANT CHANGE UP (ref 10.3–14.5)
S AUREUS DNA NOSE QL NAA+PROBE: DETECTED
SODIUM SERPL-SCNC: 143 MMOL/L — SIGNIFICANT CHANGE UP (ref 135–145)
WBC # BLD: 6.76 K/UL — SIGNIFICANT CHANGE UP (ref 3.8–10.5)
WBC # FLD AUTO: 6.76 K/UL — SIGNIFICANT CHANGE UP (ref 3.8–10.5)

## 2018-11-13 PROCEDURE — 99232 SBSQ HOSP IP/OBS MODERATE 35: CPT

## 2018-11-13 RX ADMIN — Medication 1 SPRAY(S): at 05:15

## 2018-11-13 RX ADMIN — FLUVOXAMINE MALEATE 50 MILLIGRAM(S): 25 TABLET ORAL at 05:15

## 2018-11-13 RX ADMIN — ATORVASTATIN CALCIUM 10 MILLIGRAM(S): 80 TABLET, FILM COATED ORAL at 22:04

## 2018-11-13 RX ADMIN — Medication 1 SPRAY(S): at 17:13

## 2018-11-13 RX ADMIN — ENOXAPARIN SODIUM 90 MILLIGRAM(S): 100 INJECTION SUBCUTANEOUS at 05:15

## 2018-11-13 RX ADMIN — CHLORHEXIDINE GLUCONATE 15 MILLILITER(S): 213 SOLUTION TOPICAL at 05:15

## 2018-11-13 RX ADMIN — TAMSULOSIN HYDROCHLORIDE 0.4 MILLIGRAM(S): 0.4 CAPSULE ORAL at 22:03

## 2018-11-13 RX ADMIN — Medication 325 MILLIGRAM(S): at 16:27

## 2018-11-13 RX ADMIN — FLUVOXAMINE MALEATE 50 MILLIGRAM(S): 25 TABLET ORAL at 17:13

## 2018-11-13 RX ADMIN — CHLORHEXIDINE GLUCONATE 15 MILLILITER(S): 213 SOLUTION TOPICAL at 17:13

## 2018-11-13 RX ADMIN — RISPERIDONE 2 MILLIGRAM(S): 4 TABLET ORAL at 05:15

## 2018-11-13 RX ADMIN — RISPERIDONE 2 MILLIGRAM(S): 4 TABLET ORAL at 17:13

## 2018-11-13 RX ADMIN — Medication 1 TABLET(S): at 16:27

## 2018-11-13 RX ADMIN — Medication 500 MILLIGRAM(S): at 05:15

## 2018-11-13 RX ADMIN — LORATADINE 10 MILLIGRAM(S): 10 TABLET ORAL at 16:27

## 2018-11-13 RX ADMIN — ENOXAPARIN SODIUM 90 MILLIGRAM(S): 100 INJECTION SUBCUTANEOUS at 17:14

## 2018-11-13 RX ADMIN — Medication 500 MILLIGRAM(S): at 17:13

## 2018-11-13 RX ADMIN — Medication 1000 MILLIGRAM(S): at 22:03

## 2018-11-13 NOTE — CONSULT NOTE ADULT - SUBJECTIVE AND OBJECTIVE BOX
All records reviewed.  pt seen w pt advocate from [facility]    HPI:      58 y/o man from [facility], hx MR, lisabal, HTN, HLD, BPH, osteoporosis who presents with left leg swelling/duration unknown.  Patient had surgery in 7/2018 for  left medial malleolus fracture s/p fall.  Seen by outpatient cardiologist w above complaint and Venous Duplex leg at [facility] sig for left sided lower extremity DVT from mid thigh to calf involving mid and distal femoral vein, popliteal and visualized portions of the tibial veins in the calf.  Per staff, patient did not complain of any pain.   In the ED, patient was started on lovenox 90mg for the DVT      PAST MEDICAL & SURGICAL HISTORY:  Mental retardation  Aggressiveness  Agitation  Nonverbal  Autism  Bipolar affective disorder  Schizophrenia, paranoid type  GERD (gastroesophageal reflux disease)  Hypertension  Hyperlipidemia  Hematuria  BPH (benign prostatic hypertrophy) with urinary obstruction  Fracture of left malleolus: repaired in 7/2018      Review of System:    MEDICATIONS  (STANDING):  aspirin enteric coated 325 milliGRAM(s) Oral daily  atorvastatin 10 milliGRAM(s) Oral at bedtime  calcium carbonate 1250 mG  + Vitamin D (OsCal 500 + D) 1 Tablet(s) Oral daily  cephalexin 500 milliGRAM(s) Oral every 12 hours  chlorhexidine 0.12% Liquid 15 milliLiter(s) Swish and Spit two times a day  enoxaparin Injectable 90 milliGRAM(s) SubCutaneous two times a day  fluticasone propionate 50 MICROgram(s)/spray Nasal Spray 1 Spray(s) Both Nostrils two times a day  fluvoxaMINE 50 milliGRAM(s) Oral two times a day  loratadine 10 milliGRAM(s) Oral daily  niacin SR 1000 milliGRAM(s) Oral at bedtime  risperiDONE   Tablet 2 milliGRAM(s) Oral two times a day  tamsulosin 0.4 milliGRAM(s) Oral at bedtime    MEDICATIONS  (PRN):      Allergies    No Known Allergies    Intolerances        SOCIAL HISTORY:  neg tobacco/Etoh    FAMILY HISTORY:  No pertinent family history in first degree relatives  Family history unknown      Vital Signs Last 24 Hrs  T(C): 36.4 (13 Nov 2018 14:20), Max: 36.8 (13 Nov 2018 00:08)  T(F): 97.5 (13 Nov 2018 14:20), Max: 98.3 (13 Nov 2018 00:08)  HR: 75 (13 Nov 2018 14:20) (56 - 86)  BP: 138/75 (13 Nov 2018 14:20) (113/77 - 160/91)  BP(mean): --  RR: 18 (13 Nov 2018 14:20) (18 - 19)  SpO2: 97% (13 Nov 2018 14:20) (94% - 97%)    PHYSICAL EXAM:      General:well developed well nourished, in no acute distress in bed  Eyes:sclera anicteric, pupils equal and reactive to light  ENMT:buccal mucosa moist, pharynx not injected  Neck:supple, trachea midline  Lungs:clear, no wheeze/rhonchi  Cardiovascular:regular rate and rhythm, S1 S2  Abdomen:soft, nontender, no organomegaly present, bowel sounds normal  Neurological:alert and oriented x3, Cranial Nerves II-XII grossly intact  Skin:no increased ecchymosis/petechiae/purpura  Lymph Nodes:no palpable cervical/supraclavicular lymph nodes enlargements  Extremities:left leg trace edema to thigh        LABS:                        11.2   6.76  )-----------( 291      ( 13 Nov 2018 08:00 )             34.0     11-13 @ 08:00  WBC6.76  RBC4.08 Hgb11.2 Hct34.0  MCV83.3  Hxur682  Auto Txxdkv75.3 Band-- Auto Lymph26.9 Atypical Lymph-- Reactive Lymph-- Auto Mono9.0 Auto Eos3.1 Auto Baso0.6        11-12 @ 16:40  WBC7.72  RBC4.07 Hgb11.3 Hct34.2  MCV84.0  Xkdd478  Auto Rhazrj48.2 Band-- Auto Lymph25.0 Atypical Lymph-- Reactive Lymph-- Auto Mono10.2 Auto Eos4.0 Auto Baso0.3          11-13    143  |  108  |  6<L>  ----------------------------<  91  3.9   |  28  |  0.69    Ca    9.5      13 Nov 2018 08:00  Phos  3.3     11-13  Mg     2.2     11-13    TPro  6.8  /  Alb  3.2<L>  /  TBili  0.3  /  DBili  x   /  AST  17  /  ALT  21  /  AlkPhos  149<H>  11-13 11-13 @ 08:00  PT13.7 INR1.25  PTT62.9  11-12 @ 16:40  PT13.7 INR1.25  PTT42.8        PERIPHERAL BLOOD SMEAR REVIEW:      RADIOLOGY & ADDITIONAL STUDIES:

## 2018-11-13 NOTE — SWALLOW BEDSIDE ASSESSMENT ADULT - SWALLOW EVAL: RECOMMENDED FEEDING/EATING TECHNIQUES
small sips/bites/alternate food with liquid/crush medication (when feasible)/position upright (90 degrees)/maintain upright posture during/after eating for 30 mins

## 2018-11-13 NOTE — PROGRESS NOTE ADULT - ASSESSMENT
Patient is 64 yo male with profound MR, HTN, HLD, BPH, osteoporosis who presents with left leg swelling, found to have left leg DVT.  Patient with stable vital signs and no visualized complaints of chest pain or difficulty breathing to suspect PE at this time.      1.  Acute deep vein thrombosis (DVT) of left lower extremity,   -Continue with lovenox 90mg BID  - Will consider switching to a NOAC Pending hematology consult  - will continue and finish current regiment of kelfex 500mg BID (did 11 out of 14 days already, will continue for 3 more days) -> no indication to escalate abx.     2.  Essential hypertension.  Stable.    - monitor BP.     3.  Hyperlipidemia,  cont with pravastatin or equivalent.     4.  Bipolar affective disorder,  Patient has a diagnosis of bipolar, OCD, schizophrenia   - for now, will continue fluvoxamine and risperidone.       Plan of care was discussed with patient  in great details, All questions were answered to their satisfaction  Seems to understand, and in agreement

## 2018-11-13 NOTE — CONSULT NOTE ADULT - ASSESSMENT
60 y/o man from [facility], hx MRsumit, HTN, HLD, BPH, osteoporosis who presents with left leg swelling/duration unknown.  Patient had surgery in 7/2018 for  left medial malleolus fracture s/p fall.  Seen by outpatient cardiologist w above complaint and Venous Duplex leg at [facility] for left sided lower extremity DVT from mid thigh to calf involving mid and distal femoral vein, popliteal and visualized portions of the tibial veins in the calf.  Per staff, patient did not complain of any pain.   In the ED, patient was started on lovenox 90mg for the DVT    left leg DVT w mild swelling, post left malleolus fx surgery.  provoked.  recommend 3 months of anticoagulation, can use Xarelto.  no thrombophilia workup necessary.    discussed w advocate    thank you, please call if any questions

## 2018-11-13 NOTE — PROGRESS NOTE ADULT - SUBJECTIVE AND OBJECTIVE BOX
CC.  Left DVT  HPI.  Patient appears to be comfortable.  Non-verbal at baseline.    Unable to obtain ROS, and History       Vital Signs Last 24 Hrs  T(C): 36.7 (13 Nov 2018 09:38), Max: 36.8 (13 Nov 2018 00:08)  T(F): 98 (13 Nov 2018 09:38), Max: 98.3 (13 Nov 2018 00:08)  HR: 64 (13 Nov 2018 09:38) (56 - 86)  BP: 123/74 (13 Nov 2018 09:38) (113/77 - 160/91)  BP(mean): --  RR: 18 (13 Nov 2018 09:38) (18 - 19)  SpO2: 94% (13 Nov 2018 09:38) (94% - 97%)      PHYSICAL EXAM-  GENERAL: NAD  HEAD:  Atraumatic, Normocephalic  EYES: EOMI, PERRLA, conjunctiva and sclera clear  NECK: Supple, No JVD, Normal thyroid  NERVOUS SYSTEM:  Alert moving all extremities  CHEST/LUNG: Clear to percussion bilaterally; No rales, rhonchi, wheezing, or rubs  HEART: Regular rate and rhythm; No murmurs, rubs, or gallops  ABDOMEN: Soft, Nontender, Nondistended; Bowel sounds present  EXTREMITIES:    No clubbing, cyanosis, or edema  SKIN: No rashes or lesions                              11.2   6.76  )-----------( 291      ( 13 Nov 2018 08:00 )             34.0     11-13    143  |  108  |  6<L>  ----------------------------<  91  3.9   |  28  |  0.69    Ca    9.5      13 Nov 2018 08:00  Phos  3.3     11-13  Mg     2.2     11-13    TPro  6.8  /  Alb  3.2<L>  /  TBili  0.3  /  DBili  x   /  AST  17  /  ALT  21  /  AlkPhos  149<H>  11-13            PT/INR - ( 13 Nov 2018 08:00 )   PT: 13.7 sec;   INR: 1.25 ratio         PTT - ( 13 Nov 2018 08:00 )  PTT:62.9 sec    MEDICATIONS  (STANDING):  aspirin enteric coated 325 milliGRAM(s) Oral daily  atorvastatin 10 milliGRAM(s) Oral at bedtime  calcium carbonate 1250 mG  + Vitamin D (OsCal 500 + D) 1 Tablet(s) Oral daily  cephalexin 500 milliGRAM(s) Oral every 12 hours  chlorhexidine 0.12% Liquid 15 milliLiter(s) Swish and Spit two times a day  enoxaparin Injectable 90 milliGRAM(s) SubCutaneous two times a day  fluticasone propionate 50 MICROgram(s)/spray Nasal Spray 1 Spray(s) Both Nostrils two times a day  fluvoxaMINE 50 milliGRAM(s) Oral two times a day  loratadine 10 milliGRAM(s) Oral daily  niacin SR 1000 milliGRAM(s) Oral at bedtime  risperiDONE   Tablet 2 milliGRAM(s) Oral two times a day  tamsulosin 0.4 milliGRAM(s) Oral at bedtime    MEDICATIONS  (PRN):    Imaging Personally Reviewed:     [x ] YES  [ ] NO    Consultant(s) Notes Reviewed:  [x ] YES  [ ] NO    Care Discussed with Consultants/Other Providers [x ] YES  [ ] No medical contraindication for discharge

## 2018-11-14 ENCOUNTER — TRANSCRIPTION ENCOUNTER (OUTPATIENT)
Age: 63
End: 2018-11-14

## 2018-11-14 VITALS
SYSTOLIC BLOOD PRESSURE: 129 MMHG | TEMPERATURE: 98 F | DIASTOLIC BLOOD PRESSURE: 79 MMHG | RESPIRATION RATE: 24 BRPM | OXYGEN SATURATION: 94 % | HEART RATE: 116 BPM

## 2018-11-14 LAB
ANION GAP SERPL CALC-SCNC: 10 MMOL/L — SIGNIFICANT CHANGE UP (ref 5–17)
BUN SERPL-MCNC: 17 MG/DL — SIGNIFICANT CHANGE UP (ref 7–23)
CALCIUM SERPL-MCNC: 9.5 MG/DL — SIGNIFICANT CHANGE UP (ref 8.4–10.5)
CHLORIDE SERPL-SCNC: 106 MMOL/L — SIGNIFICANT CHANGE UP (ref 96–108)
CO2 SERPL-SCNC: 26 MMOL/L — SIGNIFICANT CHANGE UP (ref 22–31)
CREAT SERPL-MCNC: 0.73 MG/DL — SIGNIFICANT CHANGE UP (ref 0.5–1.3)
GLUCOSE SERPL-MCNC: 117 MG/DL — HIGH (ref 70–99)
HCT VFR BLD CALC: 35.1 % — LOW (ref 39–50)
HGB BLD-MCNC: 11.5 G/DL — LOW (ref 13–17)
MCHC RBC-ENTMCNC: 26.9 PG — LOW (ref 27–34)
MCHC RBC-ENTMCNC: 32.8 GM/DL — SIGNIFICANT CHANGE UP (ref 32–36)
MCV RBC AUTO: 82.2 FL — SIGNIFICANT CHANGE UP (ref 80–100)
NRBC # BLD: 0 /100 WBCS — SIGNIFICANT CHANGE UP (ref 0–0)
PLATELET # BLD AUTO: 272 K/UL — SIGNIFICANT CHANGE UP (ref 150–400)
POTASSIUM SERPL-MCNC: 4.1 MMOL/L — SIGNIFICANT CHANGE UP (ref 3.5–5.3)
POTASSIUM SERPL-SCNC: 4.1 MMOL/L — SIGNIFICANT CHANGE UP (ref 3.5–5.3)
RBC # BLD: 4.27 M/UL — SIGNIFICANT CHANGE UP (ref 4.2–5.8)
RBC # FLD: 13.3 % — SIGNIFICANT CHANGE UP (ref 10.3–14.5)
SODIUM SERPL-SCNC: 142 MMOL/L — SIGNIFICANT CHANGE UP (ref 135–145)
WBC # BLD: 8.16 K/UL — SIGNIFICANT CHANGE UP (ref 3.8–10.5)
WBC # FLD AUTO: 8.16 K/UL — SIGNIFICANT CHANGE UP (ref 3.8–10.5)

## 2018-11-14 PROCEDURE — 99239 HOSP IP/OBS DSCHRG MGMT >30: CPT

## 2018-11-14 PROCEDURE — 85610 PROTHROMBIN TIME: CPT

## 2018-11-14 PROCEDURE — 99285 EMERGENCY DEPT VISIT HI MDM: CPT | Mod: 25

## 2018-11-14 PROCEDURE — 94640 AIRWAY INHALATION TREATMENT: CPT

## 2018-11-14 PROCEDURE — 85730 THROMBOPLASTIN TIME PARTIAL: CPT

## 2018-11-14 PROCEDURE — 96372 THER/PROPH/DIAG INJ SC/IM: CPT

## 2018-11-14 PROCEDURE — 87581 M.PNEUMON DNA AMP PROBE: CPT

## 2018-11-14 PROCEDURE — 87486 CHLMYD PNEUM DNA AMP PROBE: CPT

## 2018-11-14 PROCEDURE — 85027 COMPLETE CBC AUTOMATED: CPT

## 2018-11-14 PROCEDURE — 87633 RESP VIRUS 12-25 TARGETS: CPT

## 2018-11-14 PROCEDURE — 84100 ASSAY OF PHOSPHORUS: CPT

## 2018-11-14 PROCEDURE — 93005 ELECTROCARDIOGRAM TRACING: CPT

## 2018-11-14 PROCEDURE — 87798 DETECT AGENT NOS DNA AMP: CPT

## 2018-11-14 PROCEDURE — 80048 BASIC METABOLIC PNL TOTAL CA: CPT

## 2018-11-14 PROCEDURE — 36415 COLL VENOUS BLD VENIPUNCTURE: CPT

## 2018-11-14 PROCEDURE — 87640 STAPH A DNA AMP PROBE: CPT

## 2018-11-14 PROCEDURE — 83735 ASSAY OF MAGNESIUM: CPT

## 2018-11-14 PROCEDURE — 80053 COMPREHEN METABOLIC PANEL: CPT

## 2018-11-14 PROCEDURE — 87641 MR-STAPH DNA AMP PROBE: CPT

## 2018-11-14 PROCEDURE — 71045 X-RAY EXAM CHEST 1 VIEW: CPT

## 2018-11-14 RX ORDER — RIVAROXABAN 15 MG-20MG
15 KIT ORAL
Qty: 0 | Refills: 0 | Status: DISCONTINUED | OUTPATIENT
Start: 2018-11-14 | End: 2018-11-14

## 2018-11-14 RX ORDER — ACETAMINOPHEN 500 MG
2 TABLET ORAL
Qty: 0 | Refills: 0 | COMMUNITY

## 2018-11-14 RX ORDER — RIVAROXABAN 15 MG-20MG
1 KIT ORAL
Qty: 42 | Refills: 0 | OUTPATIENT
Start: 2018-11-14 | End: 2018-12-04

## 2018-11-14 RX ORDER — ASPIRIN/CALCIUM CARB/MAGNESIUM 324 MG
1 TABLET ORAL
Qty: 0 | Refills: 0 | COMMUNITY

## 2018-11-14 RX ADMIN — Medication 325 MILLIGRAM(S): at 12:38

## 2018-11-14 RX ADMIN — ENOXAPARIN SODIUM 90 MILLIGRAM(S): 100 INJECTION SUBCUTANEOUS at 05:32

## 2018-11-14 RX ADMIN — CHLORHEXIDINE GLUCONATE 15 MILLILITER(S): 213 SOLUTION TOPICAL at 05:32

## 2018-11-14 RX ADMIN — Medication 1 SPRAY(S): at 05:32

## 2018-11-14 RX ADMIN — FLUVOXAMINE MALEATE 50 MILLIGRAM(S): 25 TABLET ORAL at 05:32

## 2018-11-14 RX ADMIN — RISPERIDONE 2 MILLIGRAM(S): 4 TABLET ORAL at 05:33

## 2018-11-14 RX ADMIN — LORATADINE 10 MILLIGRAM(S): 10 TABLET ORAL at 12:38

## 2018-11-14 RX ADMIN — Medication 500 MILLIGRAM(S): at 05:32

## 2018-11-14 RX ADMIN — Medication 1 TABLET(S): at 12:38

## 2018-11-14 NOTE — DISCHARGE NOTE ADULT - PLAN OF CARE
Continue with current anticoagulation for 3 months start xarelto 15 mg BID for 21 days then 20 mg po daily  mechanical soft diet Continue with home medications

## 2018-11-14 NOTE — DISCHARGE NOTE ADULT - PROVIDER TOKENS
FREE:[LAST:[bella],FIRST:[madi],PHONE:[(   )    -],FAX:[(   )    -],ADDRESS:[Dr. Madi Pina MD    [phone]]]

## 2018-11-14 NOTE — DISCHARGE NOTE ADULT - CARE PLAN
Principal Discharge DX:	Acute deep vein thrombosis (DVT) of left lower extremity, unspecified vein  Goal:	Continue with current anticoagulation for 3 months  Assessment and plan of treatment:	start xarelto 15 mg BID for 21 days then 20 mg po daily  mechanical soft diet  Secondary Diagnosis:	Bipolar affective disorder  Goal:	Continue with home medications

## 2018-11-14 NOTE — DISCHARGE NOTE ADULT - MEDICATION SUMMARY - MEDICATIONS TO TAKE
I will START or STAY ON the medications listed below when I get home from the hospital:    chlorhexadine rinse  -- 0.5 dose(s) by mouth 2 times a day Swish and spit out  -- Indication: For rinse    Tylenol 325 mg oral tablet  -- 2 tab(s) by mouth every 6 hours, As Needed  -- Indication: For Pain    Aspirin Enteric Coated 325 mg oral delayed release tablet  -- 1 tab(s) by mouth once a day  -- Indication: For Proph    Flomax 0.4 mg oral capsule  -- 1 cap(s) by mouth once a day  -- Indication: For Bph    rivaroxaban 15 mg oral tablet  -- 1 tab(s) by mouth 2 times a day  -- Indication: For dvt    fluvoxaMINE 100 mg oral tablet  -- 0.5 tab(s) by mouth 2 times a day  -- Indication: For mental illness    loratadine 10 mg oral tablet  -- 1 tab(s) by mouth once a day  -- Indication: For Allergic rhinitis    niacin 1000 mg oral tablet, extended release  -- 1 tab(s) by mouth once a day (at bedtime)  -- Indication: For Hld    pravastatin 40 mg oral tablet  -- 1 tab(s) by mouth once a day (at bedtime)  -- Indication: For Hld    risperiDONE 2 mg oral tablet  -- 1 tab(s) by mouth 2 times a day  -- Indication: For mental illness    Keflex 500 mg oral capsule  -- 1 cap(s) by mouth every 12 hours  -- Indication: For infection    fluticasone 50 mcg/inh nasal spray  -- 1 spray(s) into nose 2 times a day  -- Indication: For Allergic rhinitis    Debrox 6.5% otic solution  -- 5 drop(s) to each affected ear 2 times a day, seven days per month  -- Indication: For Ear wax    Calcium 600+D oral tablet  -- 1 tab(s) by mouth 2 times a day  -- Indication: For vit

## 2018-11-14 NOTE — DISCHARGE NOTE ADULT - PATIENT PORTAL LINK FT
You can access the [facility] Portal, offered by [facility], by registering with the following website: [email or web address]

## 2018-11-14 NOTE — DISCHARGE NOTE ADULT - HOSPITAL COURSE
Patient is 62 yo male with profound MR, HTN, HLD, BPH, osteoporosis who presents with left leg swelling, found to have left leg DVT.  Patient with stable vital signs and no visualized complaints of chest pain or difficulty breathing to suspect PE at this time.      1.  Acute deep vein thrombosis (DVT) of left lower extremity,   -Will switch anticoagulation from Lovenox to Xarelto   Xarelto 15 mg po BID for 21 days then 20 mg po daily   - will continue and finish current regiment of kelfex 500mg BID (did 11 out of 14 days already, will continue for 3 more days) -> no indication to escalate abx.     2.  Essential hypertension.  Stable.    - monitor BP.     3.  Hyperlipidemia,  cont with pravastatin or equivalent.     4.  Bipolar affective disorder,  Patient has a diagnosis of bipolar, OCD, schizophrenia   - for now, will continue fluvoxamine and risperidone.       Plan of care was discussed with patient and advocate  in great details, All questions were answered to their satisfaction  Seems to understand, and in agreement      Patient is nonverbal.  Unable to obtain ROS, or history   Vital Signs Last 24 Hrs  T(C): 36.6 (14 Nov 2018 05:16), Max: 37 (14 Nov 2018 00:58)  T(F): 97.9 (14 Nov 2018 05:16), Max: 98.6 (14 Nov 2018 00:58)  HR: 74 (14 Nov 2018 05:16) (65 - 86)  BP: 134/74 (14 Nov 2018 05:16) (114/68 - 138/75)  BP(mean): --  RR: 17 (14 Nov 2018 05:16) (17 - 18)  SpO2: 96% (14 Nov 2018 05:16) (94% - 98%)    PHYSICAL EXAM-  GENERAL: NAD   HEAD:  Atraumatic, Normocephalic  EYES: EOMI, PERRLA, conjunctiva and sclera clear  NECK: Supple, No JVD, Normal thyroid  NERVOUS SYSTEM:  Alert & Oriented X3, Moving all extremities  CHEST/LUNG: Clear to percussion bilaterally; No rales, rhonchi, wheezing, or rubs  HEART: Regular rate and rhythm; No murmurs, rubs, or gallops  ABDOMEN: Soft, Nontender, Nondistended; Bowel sounds present  EXTREMITIES:  2+ Peripheral Pulses, No clubbing, cyanosis, or edema  SKIN: No rashes or lesions

## 2018-11-14 NOTE — DISCHARGE NOTE ADULT - ADDITIONAL INSTRUCTIONS
House physician to resume care in one week  Please follow up with hematology in one week as needed  please come back to the hospital if you develop any fall, injury, shortness of breath, chest pain or any new symptoms or concerns

## 2018-11-26 ENCOUNTER — APPOINTMENT (OUTPATIENT)
Dept: ORTHOPEDIC SURGERY | Facility: CLINIC | Age: 63
End: 2018-11-26
Payer: MEDICARE

## 2018-11-26 PROCEDURE — 99214 OFFICE O/P EST MOD 30 MIN: CPT

## 2018-11-26 PROCEDURE — 73610 X-RAY EXAM OF ANKLE: CPT | Mod: LT

## 2019-01-25 ENCOUNTER — INPATIENT (INPATIENT)
Facility: HOSPITAL | Age: 64
LOS: 5 days | Discharge: NURSING, REHAB OR LONG-TERM CARE | DRG: 378 | End: 2019-01-31
Attending: INTERNAL MEDICINE | Admitting: INTERNAL MEDICINE
Payer: MEDICARE

## 2019-01-25 VITALS
RESPIRATION RATE: 19 BRPM | DIASTOLIC BLOOD PRESSURE: 68 MMHG | SYSTOLIC BLOOD PRESSURE: 102 MMHG | TEMPERATURE: 99 F | WEIGHT: 186.07 LBS | OXYGEN SATURATION: 100 % | HEIGHT: 70 IN | HEART RATE: 126 BPM

## 2019-01-25 DIAGNOSIS — K92.2 GASTROINTESTINAL HEMORRHAGE, UNSPECIFIED: ICD-10-CM

## 2019-01-25 DIAGNOSIS — R47.01 APHASIA: ICD-10-CM

## 2019-01-25 DIAGNOSIS — D62 ACUTE POSTHEMORRHAGIC ANEMIA: ICD-10-CM

## 2019-01-25 DIAGNOSIS — F31.9 BIPOLAR DISORDER, UNSPECIFIED: ICD-10-CM

## 2019-01-25 DIAGNOSIS — S82.892A OTHER FRACTURE OF LEFT LOWER LEG, INITIAL ENCOUNTER FOR CLOSED FRACTURE: Chronic | ICD-10-CM

## 2019-01-25 DIAGNOSIS — F39 UNSPECIFIED MOOD [AFFECTIVE] DISORDER: ICD-10-CM

## 2019-01-25 DIAGNOSIS — F79 UNSPECIFIED INTELLECTUAL DISABILITIES: ICD-10-CM

## 2019-01-25 DIAGNOSIS — I82.409 ACUTE EMBOLISM AND THROMBOSIS OF UNSPECIFIED DEEP VEINS OF UNSPECIFIED LOWER EXTREMITY: ICD-10-CM

## 2019-01-25 DIAGNOSIS — E78.49 OTHER HYPERLIPIDEMIA: ICD-10-CM

## 2019-01-25 DIAGNOSIS — M79.3 PANNICULITIS, UNSPECIFIED: ICD-10-CM

## 2019-01-25 DIAGNOSIS — F20.0 PARANOID SCHIZOPHRENIA: ICD-10-CM

## 2019-01-25 DIAGNOSIS — F84.0 AUTISTIC DISORDER: ICD-10-CM

## 2019-01-25 DIAGNOSIS — K44.9 DIAPHRAGMATIC HERNIA WITHOUT OBSTRUCTION OR GANGRENE: ICD-10-CM

## 2019-01-25 DIAGNOSIS — D72.829 ELEVATED WHITE BLOOD CELL COUNT, UNSPECIFIED: ICD-10-CM

## 2019-01-25 DIAGNOSIS — R79.89 OTHER SPECIFIED ABNORMAL FINDINGS OF BLOOD CHEMISTRY: ICD-10-CM

## 2019-01-25 LAB
ABO RH CONFIRMATION: SIGNIFICANT CHANGE UP
ALBUMIN SERPL ELPH-MCNC: 3.1 G/DL — LOW (ref 3.3–5)
ALP SERPL-CCNC: 116 U/L — SIGNIFICANT CHANGE UP (ref 30–120)
ALT FLD-CCNC: 17 U/L DA — SIGNIFICANT CHANGE UP (ref 10–60)
ANION GAP SERPL CALC-SCNC: 11 MMOL/L — SIGNIFICANT CHANGE UP (ref 5–17)
APPEARANCE UR: CLEAR — SIGNIFICANT CHANGE UP
APTT BLD: 33.5 SEC — SIGNIFICANT CHANGE UP (ref 28.5–37)
AST SERPL-CCNC: 18 U/L — SIGNIFICANT CHANGE UP (ref 10–40)
BASOPHILS # BLD AUTO: 0.02 K/UL — SIGNIFICANT CHANGE UP (ref 0–0.2)
BASOPHILS NFR BLD AUTO: 0.2 % — SIGNIFICANT CHANGE UP (ref 0–2)
BILIRUB SERPL-MCNC: 0.1 MG/DL — LOW (ref 0.2–1.2)
BILIRUB UR-MCNC: NEGATIVE — SIGNIFICANT CHANGE UP
BUN SERPL-MCNC: 47 MG/DL — HIGH (ref 7–23)
CALCIUM SERPL-MCNC: 9.2 MG/DL — SIGNIFICANT CHANGE UP (ref 8.4–10.5)
CHLORIDE SERPL-SCNC: 104 MMOL/L — SIGNIFICANT CHANGE UP (ref 96–108)
CO2 SERPL-SCNC: 25 MMOL/L — SIGNIFICANT CHANGE UP (ref 22–31)
COLOR SPEC: YELLOW — SIGNIFICANT CHANGE UP
CREAT SERPL-MCNC: 0.89 MG/DL — SIGNIFICANT CHANGE UP (ref 0.5–1.3)
DIFF PNL FLD: NEGATIVE — SIGNIFICANT CHANGE UP
EOSINOPHIL # BLD AUTO: 0 K/UL — SIGNIFICANT CHANGE UP (ref 0–0.5)
EOSINOPHIL NFR BLD AUTO: 0 % — SIGNIFICANT CHANGE UP (ref 0–6)
GLUCOSE SERPL-MCNC: 178 MG/DL — HIGH (ref 70–99)
GLUCOSE UR QL: NEGATIVE MG/DL — SIGNIFICANT CHANGE UP
HCT VFR BLD CALC: 20.8 % — CRITICAL LOW (ref 39–50)
HCT VFR BLD CALC: 22.2 % — LOW (ref 39–50)
HGB BLD-MCNC: 6.8 G/DL — CRITICAL LOW (ref 13–17)
HGB BLD-MCNC: 7.1 G/DL — LOW (ref 13–17)
IMM GRANULOCYTES NFR BLD AUTO: 0.5 % — SIGNIFICANT CHANGE UP (ref 0–1.5)
INR BLD: 1.77 RATIO — HIGH (ref 0.88–1.16)
KETONES UR-MCNC: ABNORMAL
LACTATE SERPL-SCNC: 2 MMOL/L — SIGNIFICANT CHANGE UP (ref 0.7–2)
LACTATE SERPL-SCNC: 2.8 MMOL/L — HIGH (ref 0.7–2)
LEUKOCYTE ESTERASE UR-ACNC: NEGATIVE — SIGNIFICANT CHANGE UP
LIDOCAIN IGE QN: 93 U/L — SIGNIFICANT CHANGE UP (ref 73–393)
LYMPHOCYTES # BLD AUTO: 0.96 K/UL — LOW (ref 1–3.3)
LYMPHOCYTES # BLD AUTO: 7.4 % — LOW (ref 13–44)
MCHC RBC-ENTMCNC: 27.4 PG — SIGNIFICANT CHANGE UP (ref 27–34)
MCHC RBC-ENTMCNC: 28.6 PG — SIGNIFICANT CHANGE UP (ref 27–34)
MCHC RBC-ENTMCNC: 32 GM/DL — SIGNIFICANT CHANGE UP (ref 32–36)
MCHC RBC-ENTMCNC: 32.7 GM/DL — SIGNIFICANT CHANGE UP (ref 32–36)
MCV RBC AUTO: 85.7 FL — SIGNIFICANT CHANGE UP (ref 80–100)
MCV RBC AUTO: 87.4 FL — SIGNIFICANT CHANGE UP (ref 80–100)
MONOCYTES # BLD AUTO: 0.56 K/UL — SIGNIFICANT CHANGE UP (ref 0–0.9)
MONOCYTES NFR BLD AUTO: 4.3 % — SIGNIFICANT CHANGE UP (ref 2–14)
NEUTROPHILS # BLD AUTO: 11.41 K/UL — HIGH (ref 1.8–7.4)
NEUTROPHILS NFR BLD AUTO: 87.6 % — HIGH (ref 43–77)
NITRITE UR-MCNC: NEGATIVE — SIGNIFICANT CHANGE UP
NRBC # BLD: 0 /100 WBCS — SIGNIFICANT CHANGE UP (ref 0–0)
OB PNL STL: POSITIVE
PH UR: 5 — SIGNIFICANT CHANGE UP (ref 5–8)
PLATELET # BLD AUTO: 242 K/UL — SIGNIFICANT CHANGE UP (ref 150–400)
PLATELET # BLD AUTO: 296 K/UL — SIGNIFICANT CHANGE UP (ref 150–400)
POTASSIUM SERPL-MCNC: 4.4 MMOL/L — SIGNIFICANT CHANGE UP (ref 3.5–5.3)
POTASSIUM SERPL-SCNC: 4.4 MMOL/L — SIGNIFICANT CHANGE UP (ref 3.5–5.3)
PROT SERPL-MCNC: 6.6 G/DL — SIGNIFICANT CHANGE UP (ref 6–8.3)
PROT UR-MCNC: 15 MG/DL
PROTHROM AB SERPL-ACNC: 19.6 SEC — HIGH (ref 10–12.9)
RBC # BLD: 2.38 M/UL — LOW (ref 4.2–5.8)
RBC # BLD: 2.59 M/UL — LOW (ref 4.2–5.8)
RBC # FLD: 12.9 % — SIGNIFICANT CHANGE UP (ref 10.3–14.5)
RBC # FLD: 13.4 % — SIGNIFICANT CHANGE UP (ref 10.3–14.5)
SODIUM SERPL-SCNC: 140 MMOL/L — SIGNIFICANT CHANGE UP (ref 135–145)
SP GR SPEC: 1.02 — SIGNIFICANT CHANGE UP (ref 1.01–1.02)
UROBILINOGEN FLD QL: NEGATIVE MG/DL — SIGNIFICANT CHANGE UP
WBC # BLD: 13.01 K/UL — HIGH (ref 3.8–10.5)
WBC # BLD: 15.26 K/UL — HIGH (ref 3.8–10.5)
WBC # FLD AUTO: 13.01 K/UL — HIGH (ref 3.8–10.5)
WBC # FLD AUTO: 15.26 K/UL — HIGH (ref 3.8–10.5)

## 2019-01-25 PROCEDURE — 99285 EMERGENCY DEPT VISIT HI MDM: CPT

## 2019-01-25 PROCEDURE — 99291 CRITICAL CARE FIRST HOUR: CPT

## 2019-01-25 PROCEDURE — 71045 X-RAY EXAM CHEST 1 VIEW: CPT | Mod: 26

## 2019-01-25 PROCEDURE — 74177 CT ABD & PELVIS W/CONTRAST: CPT | Mod: 26

## 2019-01-25 PROCEDURE — 93010 ELECTROCARDIOGRAM REPORT: CPT

## 2019-01-25 RX ORDER — SUCRALFATE 1 G
1 TABLET ORAL
Qty: 0 | Refills: 0 | Status: DISCONTINUED | OUTPATIENT
Start: 2019-01-25 | End: 2019-01-31

## 2019-01-25 RX ORDER — CEPHALEXIN 500 MG
1 CAPSULE ORAL
Qty: 0 | Refills: 0 | COMMUNITY

## 2019-01-25 RX ORDER — SODIUM CHLORIDE 9 MG/ML
1000 INJECTION INTRAMUSCULAR; INTRAVENOUS; SUBCUTANEOUS ONCE
Qty: 0 | Refills: 0 | Status: COMPLETED | OUTPATIENT
Start: 2019-01-25 | End: 2019-01-25

## 2019-01-25 RX ORDER — LORATADINE 10 MG/1
10 TABLET ORAL DAILY
Qty: 0 | Refills: 0 | Status: DISCONTINUED | OUTPATIENT
Start: 2019-01-25 | End: 2019-01-31

## 2019-01-25 RX ORDER — PANTOPRAZOLE SODIUM 20 MG/1
8 TABLET, DELAYED RELEASE ORAL
Qty: 80 | Refills: 0 | Status: DISCONTINUED | OUTPATIENT
Start: 2019-01-25 | End: 2019-01-27

## 2019-01-25 RX ORDER — SIMVASTATIN 20 MG/1
20 TABLET, FILM COATED ORAL AT BEDTIME
Qty: 0 | Refills: 0 | Status: DISCONTINUED | OUTPATIENT
Start: 2019-01-25 | End: 2019-01-31

## 2019-01-25 RX ORDER — RISPERIDONE 4 MG/1
2 TABLET ORAL
Qty: 0 | Refills: 0 | Status: DISCONTINUED | OUTPATIENT
Start: 2019-01-25 | End: 2019-01-31

## 2019-01-25 RX ORDER — ASPIRIN/CALCIUM CARB/MAGNESIUM 324 MG
1 TABLET ORAL
Qty: 0 | Refills: 0 | COMMUNITY

## 2019-01-25 RX ORDER — PANTOPRAZOLE SODIUM 20 MG/1
40 TABLET, DELAYED RELEASE ORAL EVERY 12 HOURS
Qty: 0 | Refills: 0 | Status: DISCONTINUED | OUTPATIENT
Start: 2019-01-25 | End: 2019-01-25

## 2019-01-25 RX ORDER — CARBAMIDE PEROXIDE 81.86 MG/ML
5 SOLUTION/ DROPS AURICULAR (OTIC)
Qty: 0 | Refills: 0 | COMMUNITY

## 2019-01-25 RX ORDER — PIPERACILLIN AND TAZOBACTAM 4; .5 G/20ML; G/20ML
3.38 INJECTION, POWDER, LYOPHILIZED, FOR SOLUTION INTRAVENOUS ONCE
Qty: 0 | Refills: 0 | Status: COMPLETED | OUTPATIENT
Start: 2019-01-25 | End: 2019-01-25

## 2019-01-25 RX ORDER — TAMSULOSIN HYDROCHLORIDE 0.4 MG/1
0.4 CAPSULE ORAL AT BEDTIME
Qty: 0 | Refills: 0 | Status: DISCONTINUED | OUTPATIENT
Start: 2019-01-25 | End: 2019-01-31

## 2019-01-25 RX ORDER — SODIUM CHLORIDE 9 MG/ML
3 INJECTION INTRAMUSCULAR; INTRAVENOUS; SUBCUTANEOUS ONCE
Qty: 0 | Refills: 0 | Status: COMPLETED | OUTPATIENT
Start: 2019-01-25 | End: 2019-01-25

## 2019-01-25 RX ORDER — FLUVOXAMINE MALEATE 25 MG/1
50 TABLET ORAL
Qty: 0 | Refills: 0 | Status: DISCONTINUED | OUTPATIENT
Start: 2019-01-25 | End: 2019-01-31

## 2019-01-25 RX ORDER — FLUTICASONE PROPIONATE 50 MCG
1 SPRAY, SUSPENSION NASAL
Qty: 0 | Refills: 0 | Status: DISCONTINUED | OUTPATIENT
Start: 2019-01-25 | End: 2019-01-31

## 2019-01-25 RX ORDER — SODIUM CHLORIDE 9 MG/ML
1000 INJECTION INTRAMUSCULAR; INTRAVENOUS; SUBCUTANEOUS
Qty: 0 | Refills: 0 | Status: DISCONTINUED | OUTPATIENT
Start: 2019-01-25 | End: 2019-01-27

## 2019-01-25 RX ADMIN — LORATADINE 10 MILLIGRAM(S): 10 TABLET ORAL at 18:57

## 2019-01-25 RX ADMIN — RISPERIDONE 2 MILLIGRAM(S): 4 TABLET ORAL at 18:57

## 2019-01-25 RX ADMIN — FLUVOXAMINE MALEATE 50 MILLIGRAM(S): 25 TABLET ORAL at 18:57

## 2019-01-25 RX ADMIN — PIPERACILLIN AND TAZOBACTAM 3.38 GRAM(S): 4; .5 INJECTION, POWDER, LYOPHILIZED, FOR SOLUTION INTRAVENOUS at 13:30

## 2019-01-25 RX ADMIN — TAMSULOSIN HYDROCHLORIDE 0.4 MILLIGRAM(S): 0.4 CAPSULE ORAL at 21:38

## 2019-01-25 RX ADMIN — SIMVASTATIN 20 MILLIGRAM(S): 20 TABLET, FILM COATED ORAL at 21:38

## 2019-01-25 RX ADMIN — SODIUM CHLORIDE 75 MILLILITER(S): 9 INJECTION INTRAMUSCULAR; INTRAVENOUS; SUBCUTANEOUS at 18:12

## 2019-01-25 RX ADMIN — PANTOPRAZOLE SODIUM 10 MG/HR: 20 TABLET, DELAYED RELEASE ORAL at 18:12

## 2019-01-25 RX ADMIN — Medication 1 GRAM(S): at 19:16

## 2019-01-25 RX ADMIN — PANTOPRAZOLE SODIUM 10 MG/HR: 20 TABLET, DELAYED RELEASE ORAL at 21:39

## 2019-01-25 RX ADMIN — SODIUM CHLORIDE 1000 MILLILITER(S): 9 INJECTION INTRAMUSCULAR; INTRAVENOUS; SUBCUTANEOUS at 12:58

## 2019-01-25 RX ADMIN — SODIUM CHLORIDE 1000 MILLILITER(S): 9 INJECTION INTRAMUSCULAR; INTRAVENOUS; SUBCUTANEOUS at 13:00

## 2019-01-25 RX ADMIN — SODIUM CHLORIDE 3 MILLILITER(S): 9 INJECTION INTRAMUSCULAR; INTRAVENOUS; SUBCUTANEOUS at 10:00

## 2019-01-25 RX ADMIN — SODIUM CHLORIDE 1000 MILLILITER(S): 9 INJECTION INTRAMUSCULAR; INTRAVENOUS; SUBCUTANEOUS at 17:21

## 2019-01-25 RX ADMIN — Medication 1 SPRAY(S): at 18:56

## 2019-01-25 RX ADMIN — PIPERACILLIN AND TAZOBACTAM 200 GRAM(S): 4; .5 INJECTION, POWDER, LYOPHILIZED, FOR SOLUTION INTRAVENOUS at 13:00

## 2019-01-25 RX ADMIN — SODIUM CHLORIDE 1000 MILLILITER(S): 9 INJECTION INTRAMUSCULAR; INTRAVENOUS; SUBCUTANEOUS at 12:06

## 2019-01-25 NOTE — ED ADULT NURSE REASSESSMENT NOTE - NS ED NURSE REASSESS COMMENT FT1
Pt calm and cooperative, blood transfused without incident.  Medicated as per MD orders, fluids infusing as per md orders. Staff remains at bedside for entire stay.
Pt received awake on stretcher, is non-verbal but aware of conversation. Pt without any signs of pain, incontinent of urine. Skin cleaned, linen changed and condom cath applied. Pt without any skin breakdown.

## 2019-01-25 NOTE — CONSULT NOTE ADULT - PROBLEM SELECTOR PROBLEM 1
Gastrointestinal hemorrhage, unspecified gastrointestinal hemorrhage type
Gastrointestinal hemorrhage, unspecified gastrointestinal hemorrhage type

## 2019-01-25 NOTE — PROGRESS NOTE ADULT - PROBLEM SELECTOR PLAN 1
-S/P 1 units PRBC with drop in Hb, will recieive 2 more units of PRBC now  -will repeat Hb post transfusion, also repeat coags as INR was noted to be 1.77 on admit  -transfuse if Hb <7  -protonix drip, if need IV access convert drip to BID IVP  -GI following, plan for EGD, will make NPO after midnight -S/P 1 units PRBC with drop in Hb, will recieive 2 more units of PRBC now  -will repeat Hb post transfusion, also repeat coags as INR was noted to be 1.77 on admit  -transfuse if Hb <7  -protonix drip, if need IV access convert drip to BID IVP, also on sucralfate  -GI following, plan for EGD, will make NPO after midnight

## 2019-01-25 NOTE — H&P ADULT - PROBLEM SELECTOR PLAN 1
admit to [facility].  check cbc q8h.  receving 1u of prbc.  check post transfusion cbc.  Evaluated by GI . plan for endoscopy on monday 1/28.  clear liuid die.  protonix 40mg ivp q12.   notified. may be due to use of aspirin.  admit to [facility].  check cbc q8h.  receving 1u of prbc.  check post transfusion cbc.  Evaluated by GI . plan for endoscopy on monday 1/28.  clear liuid die.  protonix 40mg ivp q12.   notified.

## 2019-01-25 NOTE — ED PROVIDER NOTE - OBJECTIVE STATEMENT
Pt is a 62 yo male who presents to the ED with a cc of fall.  PMHx of aggressiveness, agitation, autism, bipolar affective disorder, BPH, GERD, hematuria, HLD, HTN, developmental delay, schizophrenia, paranoid type.  Pt is non verbal and is unable to provide history.  Per group home reports pt was being helped in the restroom when he suddenly became weak in the legs and dropped to the ground.  The staff reports that they lowered him gently to the floor on his buttocks.  Pt did not strike his head.  He then appeared to be shaking and his lips almost looked to be blue.  Staff also reports that pt appears to be paler then normal.    Of noted pt was admitted to the hospital 11/18 and was found to have a left lower ext DVT.  He was placed on Xarelto at that time.  It is unclear if this medication was stopped or if he is still taking this

## 2019-01-25 NOTE — H&P ADULT - HISTORY OF PRESENT ILLNESS
Pt is a 62 yo male who presents to the ED with a cc of fall.  PMHx of aggressiveness, agitation, autism, bipolar affective disorder, BPH, GERD, hematuria, HLD, HTN, developmental delay, schizophrenia, paranoid type.  Pt is non verbal and is unable to provide history.  Per group home reports pt was being helped in the restroom when he suddenly became weak in the legs and dropped to the ground.  The staff reports that they lowered him gently to the floor on his buttocks.  Pt did not strike his head.  He then appeared to be shaking and his lips almost looked to be blue.  Staff also reports that pt appears to be paler then normal.    Of noted pt was admitted to the hospital 11/18 and was found to have a left lower ext DVT.  He was placed on Xarelto at that time.  It is unclear if this medication was stopped or if he is still taking this.

## 2019-01-25 NOTE — CONSULT NOTE ADULT - SUBJECTIVE AND OBJECTIVE BOX
Date/Time Patient Seen:  		  Referring MD:   Data Reviewed	       Patient is a 63y old  Male who presents with a chief complaint of     Subjective/HPI      in bed  seen and examined  vs and meds reviewed    H and P reviewed  ER provider note reviewed    labs reviewed  CT scan reviewed    group home aide at the bedside     H&P Adult [Charted Location: [facility]] [Authored: 25-Jan-2019 15:09]- for Visit: [id number], Complete, Revised, Signed in Full, General    History and Physical:   Source of Information	Chart(s), Physician/Provider       History of Present Illness:  History of Present Illness:    Pt is a 62 yo male who presents to the ED with a cc of fall.  PMHx of aggressiveness, agitation, autism, bipolar affective disorder, BPH, GERD, hematuria, HLD, HTN, developmental delay, schizophrenia, paranoid type.  Pt is non verbal and is unable to provide history.  Per group home reports pt was being helped in the restroom when he suddenly became weak in the legs and dropped to the ground.  The staff reports that they lowered him gently to the floor on his buttocks.  Pt did not strike his head.  He then appeared to be shaking and his lips almost looked to be blue.  Staff also reports that pt appears to be paler then normal.    Of noted pt was admitted to the hospital 11/18 and was found to have a left lower ext DVT.  He was placed on Xarelto at that time.  It is unclear if this medication was stopped or if he is still taking this.       PAST MEDICAL & SURGICAL HISTORY:  Fall (on) (from) other stairs and steps, initial encounter  Mental retardation  Aggressiveness  Agitation  Nonverbal  Autism  Bipolar affective disorder  Schizophrenia, paranoid type  GERD (gastroesophageal reflux disease)  Hypertension  Hyperlipidemia  Hematuria  BPH (benign prostatic hypertrophy) with urinary obstruction  Fracture of left malleolus: repaired in 7/2018  No significant past surgical history        Medication list         MEDICATIONS  (STANDING):  fluticasone propionate 50 MICROgram(s)/spray Nasal Spray 1 Spray(s) Both Nostrils two times a day  fluvoxaMINE 50 milliGRAM(s) Oral two times a day  loratadine 10 milliGRAM(s) Oral daily  pantoprazole  Injectable 40 milliGRAM(s) IV Push every 12 hours  risperiDONE   Tablet 2 milliGRAM(s) Oral two times a day  simvastatin 20 milliGRAM(s) Oral at bedtime  sodium chloride 0.9%. 1000 milliLiter(s) (75 mL/Hr) IV Continuous <Continuous>  tamsulosin 0.4 milliGRAM(s) Oral at bedtime    MEDICATIONS  (PRN):         Vitals log        ICU Vital Signs Last 24 Hrs  T(C): 36.8 (25 Jan 2019 15:52), Max: 37.3 (25 Jan 2019 08:57)  T(F): 98.2 (25 Jan 2019 15:52), Max: 99.1 (25 Jan 2019 08:57)  HR: 110 (25 Jan 2019 15:52) (110 - 126)  BP: 115/70 (25 Jan 2019 15:52) (102/68 - 130/81)  BP(mean): --  ABP: --  ABP(mean): --  RR: 16 (25 Jan 2019 15:52) (16 - 20)  SpO2: 99% (25 Jan 2019 15:52) (99% - 100%)           Input and Output:  I&O's Detail      Lab Data                        7.1    13.01 )-----------( 296      ( 25 Jan 2019 10:37 )             22.2     01-25    140  |  104  |  47<H>  ----------------------------<  178<H>  4.4   |  25  |  0.89    Ca    9.2      25 Jan 2019 10:37    TPro  6.6  /  Alb  3.1<L>  /  TBili  0.1<L>  /  DBili  x   /  AST  18  /  ALT  17  /  AlkPhos  116  01-25            Review of Systems	      Objective     Physical Examination    chest symmetric  lung dec BS  abd soft  cn grossly int  non verbal      Pertinent Lab findings & Imaging      Javed:  NO   Adequate UO     I&O's Detail           Discussed with:     Cultures:	        Radiology      EXAM:  CT ABDOMEN AND PELVIS IC                                  PROCEDURE DATE:  01/25/2019          INTERPRETATION:  History: Abdominal pain.    CT abdomen and pelvis only IV contrast.  Clear lung bases. Trace basilar effusions.  There is evidence of gastroesophageal reflux into dilated fluid-filled   hiatal hernia. Dilated fluid-filled stomach noted. No evidence of   mechanical gastric outlet obstruction.  No calcific gallstones or biliary dilatation. Mild fatty liver. Pancreas   spleen adrenals not remarkable. Mild bilateral renal cortical scarring.  Nonaneurysmal abdominal aorta.  There is mild hazy density in the mid abdominal mesentery with associated   subcentimeter mesenteric lymph nodes consistent with nonspecific   mesenteric panniculitis.  No evidence appendicitis inflamed or obstructed bowel. Colonic   diverticula without diverticulitis. No extraluminal fluid or gas.  Slightly prominent prostate. Bladder not remarkable.  No acute skeletal abnormality.    Impression:    Hiatal hernia with severe gastroesophageal reflux.  Nonspecific mesenteric panniculitis.  Colonic diverticulosis without diverticulitis.                  TRIPP WILLIAMSON M.D., ATTENDING RADIOLOGIST  This document has been electronically signed. Jan 25 2019 12:09PM

## 2019-01-25 NOTE — PROGRESS NOTE ADULT - PROBLEM SELECTOR PLAN 2
margarita -panniculitis visualized on CT abd  -started on zosyn  -trend WBCs and narrow anbx as able  -ID to eval

## 2019-01-25 NOTE — PROGRESS NOTE ADULT - SUBJECTIVE AND OBJECTIVE BOX
Patient is a 63y old  Male who presents with a chief complaint of     BRIEF HOSPITAL COURSE:     63M w/ PMH of autism, non-verbal, aggressive behavior, mental retardation, and DVT on xarelto, chronic reisdent of nursing facility. Arrives to ED today after near syncope at group home, and noted pallor. In ED Hb noted to be 7.1 w/ Fecal occult blood (+). Of note patient's Hb was 11.5 on 2018. Patient has no visualized bleeding from above or below (no hematemesis or melena).     Events last 24 hours:   -patient arrives to [facility] s/p 1 unit of PRBC Hb 7.1 --> 6.8 post transfusion, will recieive 2 more units now  -BP stable 123/65, slightly tachy to 103    PAST MEDICAL & SURGICAL HISTORY:  Mental retardation  Aggressiveness  Agitation  Nonverbal  Autism  Bipolar affective disorder  Schizophrenia, paranoid type  GERD (gastroesophageal reflux disease)  Hypertension  Hyperlipidemia  Hematuria  BPH (benign prostatic hypertrophy) with urinary obstruction  Fracture of left malleolus: repaired in 2018      Review of Systems:  unable to obtain, non-verbal at baseline       Medications:    tamsulosin 0.4 milliGRAM(s) Oral at bedtime    loratadine 10 milliGRAM(s) Oral daily    fluvoxaMINE 50 milliGRAM(s) Oral two times a day  risperiDONE   Tablet 2 milliGRAM(s) Oral two times a day        pantoprazole Infusion 8 mG/Hr IV Continuous <Continuous>  sucralfate 1 Gram(s) Oral two times a day      simvastatin 20 milliGRAM(s) Oral at bedtime    sodium chloride 0.9%. 1000 milliLiter(s) IV Continuous <Continuous>      fluticasone propionate 50 MICROgram(s)/spray Nasal Spray 1 Spray(s) Both Nostrils two times a day            ICU Vital Signs Last 24 Hrs  T(C): 36.9 (2019 20:44), Max: 37.9 (2019 17:55)  T(F): 98.4 (2019 20:44), Max: 100.2 (2019 17:55)  HR: 105 (2019 20:33) (102 - 126)  BP: 123/65 (2019 20:33) (102/68 - 130/81)  BP(mean): 83 (2019 20:33) (83 - 83)  ABP: --  ABP(mean): --  RR: 24 (2019 20:33) (16 - 24)  SpO2: 100% (2019 20:33) (99% - 100%)          I&O's Detail        LABS:                        6.8    15.26 )-----------( 242      ( 2019 18:22 )             20.8         140  |  104  |  47<H>  ----------------------------<  178<H>  4.4   |  25  |  0.89    Ca    9.2      2019 10:37    TPro  6.6  /  Alb  3.1<L>  /  TBili  0.1<L>  /  DBili  x   /  AST  18  /  ALT  17  /  AlkPhos  116            CAPILLARY BLOOD GLUCOSE        PT/INR - ( 2019 10:37 )   PT: 19.6 sec;   INR: 1.77 ratio         PTT - ( 2019 10:37 )  PTT:33.5 sec  Urinalysis Basic - ( 2019 10:37 )    Color: Yellow / Appearance: Clear / S.020 / pH: x  Gluc: x / Ketone: Trace  / Bili: Negative / Urobili: Negative mg/dL   Blood: x / Protein: 15 mg/dL / Nitrite: Negative   Leuk Esterase: Negative / RBC: 0-2 /HPF / WBC 3-5   Sq Epi: x / Non Sq Epi: Few / Bacteria: Few      CULTURES:      Physical Examination:    General: No acute distress.  Non-interactive    HEENT: Pupils equal, reactive to light.  Symmetric.    PULM: Clear to auscultation bilaterally, no significant sputum production    CVS: (+) S1/S2, regualr, tachy, no murmurs, rubs, or gallops    ABD: Soft, nondistended, nontender, normoactive bowel sounds, no masses. Obese    EXT: No edema, nontender    SKIN: Warm and well perfused, no rashes noted.    RADIOLOGY:     < from: CT Abdomen and Pelvis w/ IV Cont (19 @ 12:02) >  Impression:    Hiatal hernia with severe gastroesophageal reflux.  Nonspecific mesenteric panniculitis.  Colonic diverticulosis without diverticulitis.    < end of copied text >

## 2019-01-25 NOTE — PROGRESS NOTE ADULT - ASSESSMENT
63M w/ PMH of autism, non-verbal, aggressive behavior, mental retardation, and DVT on xarelto, chronic reisdent of nursing facility. Arrives to ED today after near syncope at group home, and noted pallor. In ED Hb noted to be 7.1 w/ Fecal occult blood (+). Of note patient's Hb was 11.5 on November 14, 2018. Patient has no visualized bleeding from above or below (no hematemesis or melena).     Events last 24 hours:   -patient arrives to [facility]U s/p 1 unit of PRBC Hb 7.1 --> 6.8 post transfusion, will recieive 2 more units now  -BP stable 123/65, slightly tachy to 103

## 2019-01-25 NOTE — ED PROVIDER NOTE - CARE PLAN
Principal Discharge DX:	GI bleed  Assessment and plan of treatment:	admit  Secondary Diagnosis:	Leukocytosis  Secondary Diagnosis:	Anemia  Secondary Diagnosis:	Bipolar affective disorder  Secondary Diagnosis:	GERD (gastroesophageal reflux disease)  Secondary Diagnosis:	Autism

## 2019-01-25 NOTE — H&P ADULT - ASSESSMENT
Pt is a 64 yo male who presents to the ED with a cc of fall.  PMHx of aggressiveness, agitation, autism, bipolar affective disorder, BPH, GERD, hematuria, HLD, HTN, developmental delay, schizophrenia, paranoid type.  Pt is non verbal and is unable to provide history.  Per group home reports pt was being helped in the restroom when he suddenly became weak in the legs and dropped to the ground.  The staff reports that they lowered him gently to the floor on his buttocks.  Pt did not strike his head.  He then appeared to be shaking and his lips almost looked to be blue.  Staff also reports that pt appears to be paler then normal.    Of noted pt was admitted to the hospital 11/18 and was found to have a left lower ext DVT.  He was placed on Xarelto at that time.  It is unclear if this medication was stopped or if he is still taking this.

## 2019-01-25 NOTE — ED PROVIDER NOTE - MEDICAL DECISION MAKING DETAILS
Pt is a 64 yo male who presents to the ED with a cc of fall.  PMHx of aggressiveness, agitation, autism, bipolar affective disorder, BPH, GERD, hematuria, HLD, HTN, developmental delay, schizophrenia, paranoid type.  Concern for possible GIB.  Will obtain screening labs, check INR, CT abd/pelvis, and stool for occult blood.  Pt may require transfusion

## 2019-01-25 NOTE — CONSULT NOTE ADULT - PROBLEM SELECTOR RECOMMENDATION 5
leukocytosis  likely reactive to GI bleed  no evidence of acute infection or obvious source  clear lung bases on ct scan  afebrile  vs and HD monitored  will follow and monitor - serial labs

## 2019-01-25 NOTE — CONSULT NOTE ADULT - SUBJECTIVE AND OBJECTIVE BOX
Chief Complaint:  Patient is a 63y old  Male who presents with a chief complaint of  Pt is a 64 yo male who presents to the ED with a cc of fall.  PMHx of aggressiveness, agitation, autism, bipolar affective disorder, BPH, GERD, hematuria, HLD, HTN, developmental delay, schizophrenia, paranoid type.  Pt is non verbal and is unable to provide history.  Per group home reports pt was being helped in the restroom when he suddenly became weak in the legs and dropped to the ground.  The staff reports that they lowered him gently to the floor on his buttocks.  Pt did not strike his head.  He then appeared to be shaking and his lips almost looked to be blue.  Staff also reports that pt appears to be paler then normal.    Of noted pt was admitted to the hospital  and was found to have a left lower ext DVT.  He was placed on Xarelto at that time.  It is unclear if this medication was stopped or if he is still taking this.       Review of Systems:  · Additional ROS	unable to obtained as pt is confused and lethargic.	  no brbpr melena today    Allergies:  No Known Allergies      Medications:  fluticasone propionate 50 MICROgram(s)/spray Nasal Spray 1 Spray(s) Both Nostrils two times a day  fluvoxaMINE 50 milliGRAM(s) Oral two times a day  loratadine 10 milliGRAM(s) Oral daily  pantoprazole  Injectable 40 milliGRAM(s) IV Push every 12 hours  risperiDONE   Tablet 2 milliGRAM(s) Oral two times a day  simvastatin 20 milliGRAM(s) Oral at bedtime  sodium chloride 0.9%. 1000 milliLiter(s) IV Continuous <Continuous>  tamsulosin 0.4 milliGRAM(s) Oral at bedtime      PMHX/PSHX:  Fall (on) (from) other stairs and steps, initial encounter  Mental retardation  Aggressiveness  Agitation  Nonverbal  Autism  Bipolar affective disorder  Schizophrenia, paranoid type  GERD (gastroesophageal reflux disease)  Hypertension  Hyperlipidemia  Hematuria  BPH (benign prostatic hypertrophy) with urinary obstruction  Fracture of left malleolus  No significant past surgical history      Family history:  No pertinent family history in first degree relatives  Family history unknown      Social History:     ROS:     General:  No wt loss, fevers, chills, night sweats, fatigue,   Eyes:  Good vision, no reported pain  ENT:  No sore throat, pain, runny nose, dysphagia  CV:  No pain, palpitations, hypo/hypertension  Resp:  No dyspnea, cough, tachypnea, wheezing  GI:  No pain, No nausea, No vomiting, No diarrhea, No constipation, No weight loss, No fever, No pruritis, No rectal bleeding, No tarry stools, No dysphagia,  :  No pain, bleeding, incontinence, nocturia  Muscle:  No pain, weakness  Neuro:  No weakness, tingling, memory problems  Psych:  No fatigue, insomnia, mood problems, depression  Endocrine:  No polyuria, polydipsia, cold/heat intolerance  Heme:  No petechiae, ecchymosis, easy bruisability  Skin:  No rash, tattoos, scars, edema      PHYSICAL EXAM:   Vital Signs:  Vital Signs Last 24 Hrs  T(C): 36.8 (2019 15:52), Max: 37.3 (2019 08:57)  T(F): 98.2 (2019 15:52), Max: 99.1 (2019 08:57)  HR: 110 (2019 15:52) (110 - 126)  BP: 115/70 (2019 15:52) (102/68 - 130/81)  BP(mean): --  RR: 16 (2019 15:52) (16 - 20)  SpO2: 99% (2019 15:52) (99% - 100%)  Daily Height in cm: 177.8 (2019 08:57)    Daily     GENERAL:  Appears stated age, well-groomed, well-nourished, no distress  HEENT:  NC/AT,  conjunctivae clear and pink, no thyromegaly, nodules, adenopathy, no JVD, sclera -anicteric  CHEST:  Full & symmetric excursion, no increased effort, breath sounds clear  HEART:  Regular rhythm, S1, S2, no murmur/rub/S3/S4, no abdominal bruit, no edema  ABDOMEN:  Soft, non-tender, non-distended, normoactive bowel sounds,  no masses ,no hepato-splenomegaly, no signs of chronic liver disease  EXTEREMITIES:  no cyanosis,clubbing or edema  SKIN:  No rash/erythema/ecchymoses/petechiae/wounds/abscess/warm/dry  NEURO:  Alert, oriented, no asterixis, no tremor, no encephalopathy    LABS:                        7.1    13.01 )-----------( 296      ( 2019 10:37 )             22.2         140  |  104  |  47<H>  ----------------------------<  178<H>  4.4   |  25  |  0.89    Ca    9.2      2019 10:37    TPro  6.6  /  Alb  3.1<L>  /  TBili  0.1<L>  /  DBili  x   /  AST  18  /  ALT  17  /  AlkPhos  116      LIVER FUNCTIONS - ( 2019 10:37 )  Alb: 3.1 g/dL / Pro: 6.6 g/dL / ALK PHOS: 116 U/L / ALT: 17 U/L DA / AST: 18 U/L / GGT: x           PT/INR - ( 2019 10:37 )   PT: 19.6 sec;   INR: 1.77 ratio         PTT - ( 2019 10:37 )  PTT:33.5 sec  Urinalysis Basic - ( 2019 10:37 )    Color: Yellow / Appearance: Clear / S.020 / pH: x  Gluc: x / Ketone: Trace  / Bili: Negative / Urobili: Negative mg/dL   Blood: x / Protein: 15 mg/dL / Nitrite: Negative   Leuk Esterase: Negative / RBC: 0-2 /HPF / WBC 3-5   Sq Epi: x / Non Sq Epi: Few / Bacteria: Few      Amylase Serum--      Lipase serum93       Ammonia--      Imaging:

## 2019-01-25 NOTE — ED ADULT NURSE NOTE - NSIMPLEMENTINTERV_GEN_ALL_ED
Implemented All Universal Safety Interventions:  [place] to call system. Call bell, personal items and telephone within reach. Instruct patient to call for assistance. Room bathroom lighting operational. Non-slip footwear when patient is off stretcher. Physically safe environment: no spills, clutter or unnecessary equipment. Stretcher in lowest position, wheels locked, appropriate side rails in place.

## 2019-01-26 DIAGNOSIS — K92.2 GASTROINTESTINAL HEMORRHAGE, UNSPECIFIED: ICD-10-CM

## 2019-01-26 LAB
ANION GAP SERPL CALC-SCNC: 7 MMOL/L — SIGNIFICANT CHANGE UP (ref 5–17)
BUN SERPL-MCNC: 18 MG/DL — SIGNIFICANT CHANGE UP (ref 7–23)
CALCIUM SERPL-MCNC: 8.6 MG/DL — SIGNIFICANT CHANGE UP (ref 8.4–10.5)
CHLORIDE SERPL-SCNC: 112 MMOL/L — HIGH (ref 96–108)
CO2 SERPL-SCNC: 25 MMOL/L — SIGNIFICANT CHANGE UP (ref 22–31)
CREAT SERPL-MCNC: 0.72 MG/DL — SIGNIFICANT CHANGE UP (ref 0.5–1.3)
CRP SERPL-MCNC: 0.26 MG/DL — SIGNIFICANT CHANGE UP (ref 0–0.4)
CULTURE RESULTS: SIGNIFICANT CHANGE UP
ERYTHROCYTE [SEDIMENTATION RATE] IN BLOOD: 12 MM/HR — HIGH (ref 0–9)
GLUCOSE SERPL-MCNC: 113 MG/DL — HIGH (ref 70–99)
HCT VFR BLD CALC: 24.5 % — LOW (ref 39–50)
HCT VFR BLD CALC: 28 % — LOW (ref 39–50)
HCV AB S/CO SERPL IA: 0.15 S/CO — SIGNIFICANT CHANGE UP
HCV AB SERPL-IMP: SIGNIFICANT CHANGE UP
HGB BLD-MCNC: 8.2 G/DL — LOW (ref 13–17)
HGB BLD-MCNC: 9.3 G/DL — LOW (ref 13–17)
INR BLD: 1.32 RATIO — HIGH (ref 0.88–1.16)
IRON SATN MFR SERPL: 20 % — SIGNIFICANT CHANGE UP (ref 16–55)
IRON SATN MFR SERPL: 67 UG/DL — SIGNIFICANT CHANGE UP (ref 45–165)
MCHC RBC-ENTMCNC: 28.3 PG — SIGNIFICANT CHANGE UP (ref 27–34)
MCHC RBC-ENTMCNC: 28.4 PG — SIGNIFICANT CHANGE UP (ref 27–34)
MCHC RBC-ENTMCNC: 33.2 GM/DL — SIGNIFICANT CHANGE UP (ref 32–36)
MCHC RBC-ENTMCNC: 33.5 GM/DL — SIGNIFICANT CHANGE UP (ref 32–36)
MCV RBC AUTO: 84.5 FL — SIGNIFICANT CHANGE UP (ref 80–100)
MCV RBC AUTO: 85.6 FL — SIGNIFICANT CHANGE UP (ref 80–100)
MRSA PCR RESULT.: DETECTED
NRBC # BLD: 0 /100 WBCS — SIGNIFICANT CHANGE UP (ref 0–0)
NRBC # BLD: 0 /100 WBCS — SIGNIFICANT CHANGE UP (ref 0–0)
PLATELET # BLD AUTO: 190 K/UL — SIGNIFICANT CHANGE UP (ref 150–400)
PLATELET # BLD AUTO: 205 K/UL — SIGNIFICANT CHANGE UP (ref 150–400)
POTASSIUM SERPL-MCNC: 3.8 MMOL/L — SIGNIFICANT CHANGE UP (ref 3.5–5.3)
POTASSIUM SERPL-SCNC: 3.8 MMOL/L — SIGNIFICANT CHANGE UP (ref 3.5–5.3)
PROTHROM AB SERPL-ACNC: 14.5 SEC — HIGH (ref 10–12.9)
RBC # BLD: 2.9 M/UL — LOW (ref 4.2–5.8)
RBC # BLD: 3.27 M/UL — LOW (ref 4.2–5.8)
RBC # BLD: 3.27 M/UL — LOW (ref 4.2–5.8)
RBC # FLD: 13.4 % — SIGNIFICANT CHANGE UP (ref 10.3–14.5)
RBC # FLD: 13.9 % — SIGNIFICANT CHANGE UP (ref 10.3–14.5)
RETICS #: 86 K/UL — SIGNIFICANT CHANGE UP (ref 25–125)
RETICS/RBC NFR: 2.6 % — HIGH (ref 0.5–2.5)
S AUREUS DNA NOSE QL NAA+PROBE: DETECTED
SODIUM SERPL-SCNC: 144 MMOL/L — SIGNIFICANT CHANGE UP (ref 135–145)
SPECIMEN SOURCE: SIGNIFICANT CHANGE UP
TIBC SERPL-MCNC: 341 UG/DL — SIGNIFICANT CHANGE UP (ref 220–430)
UIBC SERPL-MCNC: 274 UG/DL — SIGNIFICANT CHANGE UP (ref 110–370)
WBC # BLD: 10.79 K/UL — HIGH (ref 3.8–10.5)
WBC # BLD: 11.37 K/UL — HIGH (ref 3.8–10.5)
WBC # FLD AUTO: 10.79 K/UL — HIGH (ref 3.8–10.5)
WBC # FLD AUTO: 11.37 K/UL — HIGH (ref 3.8–10.5)

## 2019-01-26 PROCEDURE — 99233 SBSQ HOSP IP/OBS HIGH 50: CPT

## 2019-01-26 PROCEDURE — 93970 EXTREMITY STUDY: CPT | Mod: 26

## 2019-01-26 RX ADMIN — LORATADINE 10 MILLIGRAM(S): 10 TABLET ORAL at 12:14

## 2019-01-26 RX ADMIN — FLUVOXAMINE MALEATE 50 MILLIGRAM(S): 25 TABLET ORAL at 17:31

## 2019-01-26 RX ADMIN — TAMSULOSIN HYDROCHLORIDE 0.4 MILLIGRAM(S): 0.4 CAPSULE ORAL at 21:28

## 2019-01-26 RX ADMIN — FLUVOXAMINE MALEATE 50 MILLIGRAM(S): 25 TABLET ORAL at 05:34

## 2019-01-26 RX ADMIN — PANTOPRAZOLE SODIUM 10 MG/HR: 20 TABLET, DELAYED RELEASE ORAL at 21:28

## 2019-01-26 RX ADMIN — RISPERIDONE 2 MILLIGRAM(S): 4 TABLET ORAL at 05:34

## 2019-01-26 RX ADMIN — SIMVASTATIN 20 MILLIGRAM(S): 20 TABLET, FILM COATED ORAL at 21:28

## 2019-01-26 RX ADMIN — PANTOPRAZOLE SODIUM 10 MG/HR: 20 TABLET, DELAYED RELEASE ORAL at 14:35

## 2019-01-26 RX ADMIN — Medication 1 GRAM(S): at 17:31

## 2019-01-26 RX ADMIN — Medication 1 GRAM(S): at 05:34

## 2019-01-26 RX ADMIN — Medication 1 SPRAY(S): at 05:35

## 2019-01-26 RX ADMIN — SODIUM CHLORIDE 75 MILLILITER(S): 9 INJECTION INTRAMUSCULAR; INTRAVENOUS; SUBCUTANEOUS at 21:29

## 2019-01-26 RX ADMIN — Medication 1 SPRAY(S): at 17:30

## 2019-01-26 RX ADMIN — RISPERIDONE 2 MILLIGRAM(S): 4 TABLET ORAL at 17:31

## 2019-01-26 RX ADMIN — PANTOPRAZOLE SODIUM 10 MG/HR: 20 TABLET, DELAYED RELEASE ORAL at 03:31

## 2019-01-26 NOTE — DIETITIAN INITIAL EVALUATION ADULT. - NS AS NUTRI INTERV COLLABORAT
Referral to other providers/Recommend a formal SLP eval to determine proper and safe texture and consistency for pt as pt's aide reports the pt has a swallowing difficulty and was on a pureed diet PTA.

## 2019-01-26 NOTE — PROGRESS NOTE ADULT - PROBLEM SELECTOR PLAN 1
EGD  cancelled due to high risk GI will follow  Received PRBC transfusions , hgb improved to and currently hemodynamically stable.   continue fluid hydration   trend CBC  tolorating liquid diet   Cw protonix drip.   protonix 40mg ivp q12.

## 2019-01-26 NOTE — DIETITIAN INITIAL EVALUATION ADULT. - PROBLEM SELECTOR PLAN 1
may be due to use of aspirin.  admit to [facility].  check cbc q8h.  receving 1u of prbc.  check post transfusion cbc.  Evaluated by GI . plan for endoscopy on monday 1/28.  clear liuid die.  protonix 40mg ivp q12.   notified.

## 2019-01-26 NOTE — PROGRESS NOTE ADULT - ASSESSMENT
63 year old male with acute blood loss anemia secondary to GI bleed and panniculitis currently on antibiotics     Time spent: 30 mins assessing presenting problems of acute illness that poses high probability  end organ damage/dysfunction.  Medical decision making inculding plan of daily care, reviewing data, reviewing radiology, discussing with multidisciplinary team, non inclusive of procedures, discussing goals of care with patient/family

## 2019-01-26 NOTE — DIETITIAN INITIAL EVALUATION ADULT. - OTHER INFO
Pt seen in ICU for nutrition consult for BMI <18. As per chart pt is a 63 year old male with a PMH of  autism, non-verbal, aggressive behavior, mental retardation, and DVT on xarelto, chronic reisdent of nursing facility. Arrives to ED today after near syncope at group home, and noted pallor. Pt found with possible GI hemorrhage, planned for scope eval. Pt consulted for BMI <18, however pt's current weight per chart (1/26) 178.7lbs, (admission wt) 178.2lbs, pt's aide unsure of pt's current weight, however states that he has not visually noticed the pt with any weight changes. Pt appears visually well nourished. Pt seen in ICU for nutrition consult for BMI <18. As per chart pt is a 63 year old male with a PMH of  autism, non-verbal, aggressive behavior, mental retardation, and DVT on xarelto, chronic reisdent of nursing facility. Arrives to ED today after near syncope at group home, and noted pallor. Pt found with possible GI hemorrhage, planned for scope eval. Pt consulted for BMI <18, however pt's current weight per chart (1/26) 178.7lbs, (admission wt) 178.2lbs, pt's aide unsure of pt's current weight, however states that he has not visually noticed the pt with any weight changes. Pt appears visually well nourished. Per pt's aide pt with swallowing problems, recommend formal SLP eval, no GI distress at this time, no BM however passing flatus. +1 b/l feet edema, skin WNL, no pressure injuries noted at this time.

## 2019-01-26 NOTE — PROGRESS NOTE ADULT - SUBJECTIVE AND OBJECTIVE BOX
63y  Male  No Known Allergies    CC: Patient is a 63y old  Male who presents with a chief complaint of Weakness     HPI:   Pt is a 62 yo male who presents to the ED with a cc of fall.  PMHx of aggressiveness, agitation, autism, bipolar affective disorder, BPH, GERD, hematuria, HLD, HTN, developmental delay, schizophrenia, paranoid type. Arrived to ER after  syncopal episode at the group home he resides at. Upon arrival to ER he was noted to have hemoglobin of 7.1( down from 11.5 on november admission)  with possitve Fecal occult blood (+) but had no hematemesis or melena He was admitted  [facility] given 1 unit of PRBC Hb 7.1 --> 6.8 post transfusion then received 2 more units, he did not have shock bp remained stable but was slightly tachy on admission and responded to blood transfusions.     Past 24 hrs: No evidence of bleeding , hemoglobin responded to transfusion GI's plans for EGD have been put on hold for now due to no bleeding and consideration of high risk procedure, Panniculitis on CT scan on [facility]        PAST MEDICAL & SURGICAL HISTORY:  Mental retardation  Aggressiveness  Agitation  Nonverbal  Autism  Bipolar affective disorder  Schizophrenia, paranoid type  GERD (gastroesophageal reflux disease)  Hypertension  Hyperlipidemia  Hematuria  BPH (benign prostatic hypertrophy) with urinary obstruction  Fracture of left malleolus: repaired in 2018    FAMILY HISTORY:  No pertinent family history in first degree relatives  Family history unknown      Vitals   Vital Signs Last 24 Hrs  T(C): 36.8 (2019 20:01), Max: 36.9 (2019 20:44)  T(F): 98.3 (2019 20:01), Max: 98.4 (2019 20:44)  HR: 71 (2019 20:07) (63 - 109)  BP: 128/63 (2019 20:07) (88/49 - 129/67)  BP(mean): 83 (2019 20:07) (59 - 86)  RR: 21 (2019 20:07) (12 - 23)  SpO2: 99% (2019 20:07) (96% - 100%)    I&O's Summary    2019 07:01  -  2019 07:00  --------------------------------------------------------  IN: 1577 mL / OUT: 1750 mL / NET: -173 mL    2019 07:  -  2019 20:33  --------------------------------------------------------  IN: 850 mL / OUT: 0 mL / NET: 850 mL        LABS      144  |  112<H>  |  18  ----------------------------<  113<H>  3.8   |  25  |  0.72    Ca    8.6      2019 05:41    TPro  6.6  /  Alb  3.1<L>  /  TBili  0.1<L>  /  DBili  x   /  AST  18  /  ALT  17  /  AlkPhos  116                            9.3    10.79 )-----------( 205      ( 2019 11:56 )             28.0     PT/INR - ( 2019 05:41 )   PT: 14.5 sec;   INR: 1.32 ratio         PTT - ( 2019 10:37 )  PTT:33.5 sec      LIVER FUNCTIONS - ( 2019 10:37 )  Alb: 3.1 g/dL / Pro: 6.6 g/dL / ALK PHOS: 116 U/L / ALT: 17 U/L DA / AST: 18 U/L / GGT: x           CAPILLARY BLOOD GLUCOSE        Urinalysis Basic - ( 2019 10:37 )    Color: Yellow / Appearance: Clear / S.020 / pH: x  Gluc: x / Ketone: Trace  / Bili: Negative / Urobili: Negative mg/dL   Blood: x / Protein: 15 mg/dL / Nitrite: Negative   Leuk Esterase: Negative / RBC: 0-2 /HPF / WBC 3-5   Sq Epi: x / Non Sq Epi: Few / Bacteria: Few        VENT SETTINGS       Meds  MEDICATIONS  (STANDING):  fluticasone propionate 50 MICROgram(s)/spray Nasal Spray 1 Spray(s) Both Nostrils two times a day  fluvoxaMINE 50 milliGRAM(s) Oral two times a day  loratadine 10 milliGRAM(s) Oral daily  pantoprazole Infusion 8 mG/Hr (10 mL/Hr) IV Continuous <Continuous>  risperiDONE   Tablet 2 milliGRAM(s) Oral two times a day  simvastatin 20 milliGRAM(s) Oral at bedtime  sodium chloride 0.9%. 1000 milliLiter(s) (75 mL/Hr) IV Continuous <Continuous>  sucralfate 1 Gram(s) Oral two times a day  tamsulosin 0.4 milliGRAM(s) Oral at bedtime      REVIEW OF SYSTEMS:    Unable to obtain due to mental status MR/autisim and schizophrenia     Physicial Exam:     Constitutional: NAD, well-groomed, well-developed  HEENT: PERRLA, EOMI, no drainage or redness  Neck: No bruits; no thyromegaly or nodules,  No JVD  Back: Normal spine flexure, No CVA tenderness, No deformity or limitation of movement  Respiratory: Breath Sounds equal & clear to percussion & auscultation, no accessory muscle use  Cardiovascular: Regular rate & rhythm, normal S1, S2; no murmurs, gallops or rubs; no S3, S4  Gastrointestinal: Soft, non-tender, non distended no hepatosplenomegaly, normal bowel sounds  Extremities: No peripheral edema, No cyanosis, clubbing   Vascular: Equal and normal pulses: 2+ peripheral pulses throughout  Neurological: GCS:    A&O x 3; no sensory, motor  deficits, normal reflexes  Psychiatric: Normal mood, normal affect  Musculoskeletal: No joint pain, swelling or deformity; no limitation of movement  Skin: No rashes

## 2019-01-26 NOTE — PROGRESS NOTE ADULT - PROBLEM SELECTOR PLAN 3
continue antibiotics and monitor for toxiicity   supportive care
-including autism, aggressive behavior, and bipolar disorder  -continue luvox and risperidone  -patient also with 1:1 at bedside, from his residence

## 2019-01-26 NOTE — DIETITIAN INITIAL EVALUATION ADULT. - PT NOT SOURCE
pt with Autism, development delay, non-verbal, some subjective information obtained from aide at bedside/other (specify)

## 2019-01-26 NOTE — PROGRESS NOTE ADULT - PROBLEM SELECTOR PLAN 1
anemia  GI bleed  mrdd  dementia  non verbal  off AC  s/p PRBC - 3 units  GI following  heme onc eval noted  planned for scope eval today - pt is NPO  monitor vs and HD and Sat, keep MAP > 60  keep Hgb > 7  may need additional PRBC  will follow

## 2019-01-26 NOTE — CONSULT NOTE ADULT - ASSESSMENT
[ASSESSMENT and  PLAN]  I have discussed the above plan of care with patient/family in detail. They expressed understanding of the treatment plan . Risks, benefits and alternatives discussed in detail. I have asked if they have any questions or concerns and appropriately addressed them; all questions answered to their satisfactions and in lay terms.     > xxxxxx minutes spent in direct patient care, examining and counseling patient,  reviewing  the notes, lab data/ imaging , discussion with multidisciplinary team. [ASSESSMENT and  PLAN]  62yo hx MR, non-verbal, HTN, HLD, BPH, osteoporosis.   Dx 11/2018 with DVT with left leg swelling/duration of unknown. Possible provoking hx with surgery in 7/2018 for left medial malleolus fracture s/p fall.    Oupt Venous Duplex leg at [facility]: LLE DVT mid thigh to calf involving mid and distal femoral vein, popliteal and visualized portions of the tibial veins in the calf.  Was recommend 3 months of anticoagulation, with Xarelto.     Now admitted with FOBT+ and anemia, Hgb 6.      RECOMMEND:   Transfuse 1U PRBC. Monitor CBC.   GI eval/followup.   Check anemia labs.   Hold Xarelto.   Repeat LE duplex  Cont psychiatric meds.

## 2019-01-26 NOTE — DIETITIAN INITIAL EVALUATION ADULT. - PROBLEM SELECTOR PLAN 8
received 2l of ivf. will check repeat lactate level. IVF NS 75cc/hr.  f/u culture results. received dose of zosyn in ED.  no evidence of acute infection or source.  clinically does not look septic.  repeat lactate. 2.

## 2019-01-26 NOTE — DIETITIAN INITIAL EVALUATION ADULT. - ADHERENCE
n/a/Pt's aide believes that the pt was on a pureed diet with thin liquids PTA, secondary to swallowing difficulties.

## 2019-01-26 NOTE — CONSULT NOTE ADULT - SUBJECTIVE AND OBJECTIVE BOX
Patient is a 63y old  Male who presents with a chief complaint of Pale/syncope at group home (2019 21:03)      HPI:  Pt is a 62 yo male who presents to the ED with a cc of fall.  PMHx of aggressiveness, agitation, autism, bipolar affective disorder, BPH, GERD, hematuria, HLD, HTN, developmental delay, schizophrenia, paranoid type.  Pt is non verbal and is unable to provide history.  Per group home reports pt was being helped in the restroom when he suddenly became weak in the legs and dropped to the ground.  The staff reports that they lowered him gently to the floor on his buttocks.  Pt did not strike his head.  He then appeared to be shaking and his lips almost looked to be blue.  Staff also reports that pt appears to be paler then normal.    Of noted pt was admitted to the hospital  and was found to have a left lower ext DVT.  He was placed on Xarelto at that time.  It is unclear if this medication was stopped or if he is still taking this. (2019 15:09)    Seen previously in hosp 2018.   At time presents with left leg swelling/duration unknown. Patient had surgery in 2018 for  left medial malleolus fracture s/p fall.  Seen by outpatient cardiologist w above complaint and Venous Duplex leg at [facility] for left sided lower extremity DVT from mid thigh to calf involving mid and distal femoral vein, popliteal and visualized portions of the tibial veins in the calf.  Per staff, patient did not complain of any pain.   In the ED, patient was started on lovenox 90mg for the DVT. On DC was on Xarelto  Review of current med list 10/24/18 does not list Xarelto but predates discharge. MAR shows pt on Xarelto 20mg daily, last dose 19.     PAST MEDICAL & SURGICAL HISTORY:  Mental retardation  Aggressiveness  Agitation  Nonverbal  Autism  Bipolar affective disorder  Schizophrenia, paranoid type  GERD (gastroesophageal reflux disease)  Hypertension  Hyperlipidemia  Hematuria  BPH (benign prostatic hypertrophy) with urinary obstruction  Fracture of left malleolus: repaired in 2018      HEALTH ISSUES - PROBLEM Dx:  DVT (deep venous thrombosis): DVT (deep venous thrombosis)  Mood disorder: Mood disorder  Panniculitis: Panniculitis  Leukocytosis: Leukocytosis  Nonverbal: Nonverbal  Hiatal hernia: Hiatal hernia  Lactate blood increase: Lactate blood increase  Autism: Autism  Other hyperlipidemia: Other hyperlipidemia  Schizophrenia, paranoid type: Schizophrenia, paranoid type  Bipolar affective disorder: Bipolar affective disorder  Mental retardation: Mental retardation  Anemia due to acute blood loss: Anemia due to acute blood loss  Gastrointestinal hemorrhage, unspecified gastrointestinal hemorrhage type: Gastrointestinal hemorrhage, unspecified gastrointestinal hemorrhage type          FAMILY HISTORY:  No pertinent family history in first degree relatives  Family history unknown        [SOCIAL HISTORY: ]     smoking:       EtOH:       illicit drugs:       occupation:       marital status:       Other:       [ALLERGIES/INTOLERANCES:]  Allergies     No Known Allergies  Intolerances          [MEDICATIONS]  MEDICATIONS  (STANDING):  fluticasone propionate 50 MICROgram(s)/spray Nasal Spray 1 Spray(s) Both Nostrils two times a day  fluvoxaMINE 50 milliGRAM(s) Oral two times a day  loratadine 10 milliGRAM(s) Oral daily  pantoprazole Infusion 8 mG/Hr (10 mL/Hr) IV Continuous <Continuous>  risperiDONE   Tablet 2 milliGRAM(s) Oral two times a day  simvastatin 20 milliGRAM(s) Oral at bedtime  sodium chloride 0.9%. 1000 milliLiter(s) (75 mL/Hr) IV Continuous <Continuous>  sucralfate 1 Gram(s) Oral two times a day  tamsulosin 0.4 milliGRAM(s) Oral at bedtime    MEDICATIONS  (PRN):        [REVIEW OF SYSTEMS: ]  CONSTITUTIONAL: normal, no fever, no shakes, no chills   EYES: No eye pain, no visual disturbances, no discharge  ENMT:  no discharge  NECK: No pain, no stiffness  BREASTS: No pain, no masses, no nipple discharge  RESPIRATORY: No cough, no wheezing, no chills, no hemoptysis; No shortness of breath  CARDIOVASCULAR: No chest pain, no palpitations, no dizziness, no leg swelling  GASTROINTESTINAL: No abdominal or epigastric pain. No nausea, no vomiting, no hematemesis; No diarrhea , no constipation. No melena, no hematochezia.  GENITOURINARY: No dysuria, no frequency, no hematuria, no incontinence  NEUROLOGICAL: No headaches, no memory loss, no loss of strength, no numbness, no tremors  SKIN: No itching, no burning, no rashes, no lesions   LYMPH NODES: No enlarged glands  ENDOCRINE: No heat or cold intolerance; No hair loss  MUSCULOSKELETAL: No joint pain or swelling; No muscle, no back, no extremity pain  PSYCHIATRIC: No depression, no anxiety, no mood swings, no difficulty sleeping  HEME/LYMPH: No easy bruising, no bleeding gums    [VITALS SIGNS 24hrs]  Vital Signs Last 24 Hrs  T(C): 36.4 (2019 04:04), Max: 37.9 (2019 17:55)  T(F): 97.6 (2019 04:04), Max: 100.2 (2019 17:55)  HR: 85 (2019 08:00) (63 - 126)  BP: 116/61 (2019 08:00) (88/49 - 130/81)  BP(mean): 79 (2019 08:00) (59 - 85)  RR: 22 (2019 08:00) (15 - 24)  SpO2: 97% (2019 08:00) (96% - 100%)    [PHYSICAL EXAM]  General: adult in NAD,  WN,  WD.  HEENT: clear oropharynx, anicteric sclera, pink conjunctivae.  Neck: supple, no masses.  CV: normal S1S2, no murmur, no rubs, no gallops.  Lungs: clear to auscultation, no wheezes, no rales, no rhonchi.  Abdomen: soft, non-tender, non-distended, no hepatosplenomegaly, normal BS, no guarding.  Ext: no clubbing, no cyanosis, no edema.  Skin: no rashes,  no petechiae, no venous stasis changes.  Neuro: alert and oriented X3, no focal motor deficits.  LN: no SC NOLAN.      [LABS:]                        8.2    11.37 )-----------( 190      ( 2019 05:41 )             24.5     CBC Full  -  ( 2019 05:41 )  WBC Count : 11.37 K/uL  Hemoglobin : 8.2 g/dL  Hematocrit : 24.5 %  Platelet Count - Automated : 190 K/uL  Mean Cell Volume : 84.5 fl  Mean Cell Hemoglobin : 28.3 pg  Mean Cell Hemoglobin Concentration : 33.5 gm/dL  Auto Neutrophil # : x  Auto Lymphocyte # : x  Auto Monocyte # : x  Auto Eosinophil # : x  Auto Basophil # : x  Auto Neutrophil % : x  Auto Lymphocyte % : x  Auto Monocyte % : x  Auto Eosinophil % : x  Auto Basophil % : x    -    144  |  112<H>  |  18  ----------------------------<  113<H>  3.8   |  25  |  0.72    Ca    8.6      2019 05:41    TPro  6.6  /  Alb  3.1<L>  /  TBili  0.1<L>  /  DBili  x   /  AST  18  /  ALT  17  /  AlkPhos  116      PT/INR - ( 2019 05:41 )   PT: 14.5 sec;   INR: 1.32 ratio         PTT - ( 2019 10:37 )  PTT:33.5 sec  LIVER FUNCTIONS - ( 2019 10:37 )  Alb: 3.1 g/dL / Pro: 6.6 g/dL / ALK PHOS: 116 U/L / ALT: 17 U/L DA / AST: 18 U/L / GGT: x               Urinalysis Basic - ( 2019 10:37 )    Color: Yellow / Appearance: Clear / S.020 / pH: x  Gluc: x / Ketone: Trace  / Bili: Negative / Urobili: Negative mg/dL   Blood: x / Protein: 15 mg/dL / Nitrite: Negative   Leuk Esterase: Negative / RBC: 0-2 /HPF / WBC 3-5   Sq Epi: x / Non Sq Epi: Few / Bacteria: Few        WBC  TREND (5 Days)  WBC Count: 11.37 K/uL ( @ 05:41)  WBC Count: 15.26 K/uL ( @ 18:22)  WBC Count: 13.01 K/uL ( @ 10:37)    HGB  TREND (5 Days)  Hemoglobin: 8.2 g/dL ( 05:41)  Hemoglobin: 6.8 g/dL ( @ 18:22)  Hemoglobin: 7.1 g/dL ( @ 10:37)    HCT  TREND (5 Days)  Hematocrit: 24.5 % ( 05:41)  Hematocrit: 20.8 % ( @ 18:22)  Hematocrit: 22.2 % ( @ 10:37)    PLT  TREND (5 Days)  Platelet Count - Automated: 190 K/uL ( 05:41)  Platelet Count - Automated: 242 K/uL ( @ 18:22)  Platelet Count - Automated: 296 K/uL ( @ 10:37)                        [BLOOD SMEAR INTERPRETATION: ]  RBC:   WBC:   Plts:     [RADIOLOGY & ADDITIONAL STUDIES:] Patient is a 63y old  Male who presents with a chief complaint of Pale/syncope at group home (2019 21:03)      HPI:  Pt is a 64 yo male who presents to the ED with a cc of fall.  PMHx of aggressiveness, agitation, autism, bipolar affective disorder, BPH, GERD, hematuria, HLD, HTN, developmental delay, schizophrenia, paranoid type.  Pt is non verbal and is unable to provide history.  Per group home reports pt was being helped in the restroom when he suddenly became weak in the legs and dropped to the ground.  The staff reports that they lowered him gently to the floor on his buttocks.  Pt did not strike his head.  He then appeared to be shaking and his lips almost looked to be blue.  Staff also reports that pt appears to be paler then normal.    Of noted pt was admitted to the hospital  and was found to have a left lower ext DVT.  He was placed on Xarelto at that time.  It is unclear if this medication was stopped or if he is still taking this. (2019 15:09)    Seen previously in hosp 2018.   At time presents with left leg swelling/duration unknown. Patient had surgery in 2018 for  left medial malleolus fracture s/p fall.  Seen by outpatient cardiologist w above complaint and Venous Duplex leg at [facility] for left sided lower extremity DVT from mid thigh to calf involving mid and distal femoral vein, popliteal and visualized portions of the tibial veins in the calf.  Per staff, patient did not complain of any pain.   In the ED, patient was started on lovenox 90mg for the DVT. On DC was on Xarelto  Review of current med list 10/24/18 does not list Xarelto but predates discharge. MAR shows pt on Xarelto 20mg daily, last dose 19.     Hgb 6.8 on admission. FOBT+  Previously in Nov Hgb 11.3      PAST MEDICAL & SURGICAL HISTORY:  Mental retardation  Aggressiveness  Agitation  Nonverbal  Autism  Bipolar affective disorder  Schizophrenia, paranoid type  GERD (gastroesophageal reflux disease)  Hypertension  Hyperlipidemia  Hematuria  BPH (benign prostatic hypertrophy) with urinary obstruction  Fracture of left malleolus: repaired in 2018      HEALTH ISSUES - PROBLEM Dx:  DVT (deep venous thrombosis): DVT (deep venous thrombosis)  Mood disorder: Mood disorder  Panniculitis: Panniculitis  Leukocytosis: Leukocytosis  Nonverbal: Nonverbal  Hiatal hernia: Hiatal hernia  Lactate blood increase: Lactate blood increase  Autism: Autism  Other hyperlipidemia: Other hyperlipidemia  Schizophrenia, paranoid type: Schizophrenia, paranoid type  Bipolar affective disorder: Bipolar affective disorder  Mental retardation: Mental retardation  Anemia due to acute blood loss: Anemia due to acute blood loss  Gastrointestinal hemorrhage, unspecified gastrointestinal hemorrhage type: Gastrointestinal hemorrhage, unspecified gastrointestinal hemorrhage type          FAMILY HISTORY:  No pertinent family history in first degree relatives  Family history unknown        [SOCIAL HISTORY: ]     smoking:  denies     EtOH:  denies     illicit drugs:  denies     occupation:  disabled     marital status:  single     Other: lives in group home      [ALLERGIES/INTOLERANCES:]  Allergies     No Known Allergies  Intolerances          [MEDICATIONS]  MEDICATIONS  (STANDING):  fluticasone propionate 50 MICROgram(s)/spray Nasal Spray 1 Spray(s) Both Nostrils two times a day  fluvoxaMINE 50 milliGRAM(s) Oral two times a day  loratadine 10 milliGRAM(s) Oral daily  pantoprazole Infusion 8 mG/Hr (10 mL/Hr) IV Continuous <Continuous>  risperiDONE   Tablet 2 milliGRAM(s) Oral two times a day  simvastatin 20 milliGRAM(s) Oral at bedtime  sodium chloride 0.9%. 1000 milliLiter(s) (75 mL/Hr) IV Continuous <Continuous>  sucralfate 1 Gram(s) Oral two times a day  tamsulosin 0.4 milliGRAM(s) Oral at bedtime    MEDICATIONS  (PRN):        [REVIEW OF SYSTEMS: ]  CONSTITUTIONAL: normal, no fever, no shakes, no chills   EYES: No eye discharge  ENMT:  no discharge  NECK: No pain, no stiffness  BREASTS: No pain, no masses, no nipple discharge  RESPIRATORY: No cough, no wheezing, no chills, no hemoptysis; No shortness of breath  CARDIOVASCULAR: No chest pain, no palpitations, no dizziness, no leg swelling  GASTROINTESTINAL: No abdominal or epigastric pain. No nausea, no vomiting, no hematemesis; No diarrhea , no constipation. No melena, no hematochezia.  GENITOURINARY: No dysuria, no frequency, no hematuria, no incontinence  NEUROLOGICAL:  SKIN: No itching, no burning, no rashes, no lesions   LYMPH NODES: No enlarged glands  ENDOCRINE: No heat or cold intolerance; No hair loss  MUSCULOSKELETAL: No joint pain or swelling; No muscle, no back, no extremity pain  PSYCHIATRIC: No depression, no anxiety, no mood swings, no difficulty sleeping  HEME/LYMPH: No easy bruising, no bleeding gums      [VITALS SIGNS 24hrs]  Vital Signs Last 24 Hrs  T(C): 36.4 (2019 04:04), Max: 37.9 (2019 17:55)  T(F): 97.6 (2019 04:04), Max: 100.2 (2019 17:55)  HR: 85 (2019 08:00) (63 - 126)  BP: 116/61 (2019 08:00) (88/49 - 130/81)  BP(mean): 79 (2019 08:00) (59 - 85)  RR: 22 (2019 08:00) (15 - 24)  SpO2: 97% (2019 08:00) (96% - 100%)    [PHYSICAL EXAM]  General: adult in NAD,  WN,  WD.  HEENT: clear oropharynx, anicteric sclera, pink conjunctivae.  Neck: supple, no masses.  CV: normal S1S2, no murmur, no rubs, no gallops.  Lungs: clear to auscultation, no wheezes, no rales, no rhonchi.  Abdomen: soft, non-tender, non-distended, no hepatosplenomegaly, normal BS, no guarding.  Ext: no clubbing, no cyanosis, no edema.  Skin: no rashes,  no petechiae, no venous stasis changes.  Neuro: alert and oriented X0, no focal motor deficits.  LN: no SC NOLAN.      [LABS:]                        8.2    11.37 )-----------( 190      ( 2019 05:41 )             24.5     CBC Full  -  ( 2019 05:41 )  WBC Count : 11.37 K/uL  Hemoglobin : 8.2 g/dL  Hematocrit : 24.5 %  Platelet Count - Automated : 190 K/uL  Mean Cell Volume : 84.5 fl  Mean Cell Hemoglobin : 28.3 pg  Mean Cell Hemoglobin Concentration : 33.5 gm/dL  Auto Neutrophil # : x  Auto Lymphocyte # : x  Auto Monocyte # : x  Auto Eosinophil # : x  Auto Basophil # : x  Auto Neutrophil % : x  Auto Lymphocyte % : x  Auto Monocyte % : x  Auto Eosinophil % : x  Auto Basophil % : x        144  |  112<H>  |  18  ----------------------------<  113<H>  3.8   |  25  |  0.72    Ca    8.6      2019 05:41    TPro  6.6  /  Alb  3.1<L>  /  TBili  0.1<L>  /  DBili  x   /  AST  18  /  ALT  17  /  AlkPhos  116      PT/INR - ( 2019 05:41 )   PT: 14.5 sec;   INR: 1.32 ratio         PTT - ( 2019 10:37 )  PTT:33.5 sec  LIVER FUNCTIONS - ( 2019 10:37 )  Alb: 3.1 g/dL / Pro: 6.6 g/dL / ALK PHOS: 116 U/L / ALT: 17 U/L DA / AST: 18 U/L / GGT: x               Urinalysis Basic - ( 2019 10:37 )    Color: Yellow / Appearance: Clear / S.020 / pH: x  Gluc: x / Ketone: Trace  / Bili: Negative / Urobili: Negative mg/dL   Blood: x / Protein: 15 mg/dL / Nitrite: Negative   Leuk Esterase: Negative / RBC: 0-2 /HPF / WBC 3-5   Sq Epi: x / Non Sq Epi: Few / Bacteria: Few        WBC  TREND (5 Days)  WBC Count: 11.37 K/uL ( @ 05:41)  WBC Count: 15.26 K/uL ( @ 18:22)  WBC Count: 13.01 K/uL ( @ 10:37)    HGB  TREND (5 Days)  Hemoglobin: 8.2 g/dL ( 05:41)  Hemoglobin: 6.8 g/dL ( @ 18:22)  Hemoglobin: 7.1 g/dL ( @ 10:37)    HCT  TREND (5 Days)  Hematocrit: 24.5 % ( 05:41)  Hematocrit: 20.8 % ( @ 18:22)  Hematocrit: 22.2 % ( @ 10:37)    PLT  TREND (5 Days)  Platelet Count - Automated: 190 K/uL ( 05:41)  Platelet Count - Automated: 242 K/uL ( @ 18:22)  Platelet Count - Automated: 296 K/uL ( @ 10:37)    Occult Blood, Feces (19 @ 11:20)    Occult Blood, Feces: Positive               [RADIOLOGY & ADDITIONAL STUDIES:]  < from: CT Abdomen and Pelvis w/ IV Cont (19 @ 12:02) >  EXAM:  CT ABDOMEN AND PELVIS IC                        PROCEDURE DATE:  2019    INTERPRETATION:  History: Abdominal pain.    CT abdomen and pelvis only IV contrast.  Clear lung bases. Trace basilar effusions.  There is evidence of gastroesophageal reflux into dilated fluid-filled   hiatal hernia. Dilated fluid-filled stomach noted. No evidence of   mechanical gastric outlet obstruction.  No calcific gallstones or biliary dilatation. Mild fatty liver. Pancreas   spleen adrenals not remarkable. Mild bilateral renal cortical scarring.  Nonaneurysmal abdominal aorta.  There is mild hazy density in the mid abdominal mesentery with associated   subcentimeter mesenteric lymph nodes consistent with nonspecific   mesenteric panniculitis.  No evidence appendicitis inflamed or obstructed bowel. Colonic   diverticula without diverticulitis. No extraluminal fluid or gas.  Slightly prominent prostate. Bladder not remarkable.  No acute skeletal abnormality.    Impression:  Hiatal hernia with severe gastroesophageal reflux.  Nonspecific mesenteric panniculitis.  Colonic diverticulosis without diverticulitis.  < end of copied text >

## 2019-01-26 NOTE — PROGRESS NOTE ADULT - PROBLEM SELECTOR PLAN 2
post transfusion of PRBC x3  hemoglobin responded nicely  trend CBC and monitor for new bleeding  trend electrolytes

## 2019-01-26 NOTE — DIETITIAN INITIAL EVALUATION ADULT. - NS AS NUTRI INTERV MEALS SNACK
When medically feasible recommend to advance diet to DASH/TLC diet with texture and consistency deferred to SLP. RD to remain available./General/healthful diet

## 2019-01-26 NOTE — PROGRESS NOTE ADULT - SUBJECTIVE AND OBJECTIVE BOX
Patient is a 63y old  Male who presents with a chief complaint of fall (2019 08:34)    HPI:  Pt is a 64 yo male who presents to the ED with a cc of fall.  PMHx of aggressiveness, agitation, autism, bipolar affective disorder, BPH, GERD, hematuria, HLD, HTN, developmental delay, schizophrenia, paranoid type.  Pt is non verbal and is unable to provide history.  Per group home reports pt was being helped in the restroom when he suddenly became weak in the legs and dropped to the ground.  The staff reports that they lowered him gently to the floor on his buttocks.  Pt did not strike his head.  He then appeared to be shaking and his lips almost looked to be blue.  Staff also reports that pt appears to be paler then normal.    Of noted pt was admitted to the hospital  and was found to have a left lower ext DVT.  He was placed on Xarelto at that time.  It is unclear if this medication was stopped or if he is still taking this. (2019 15:09)    Today:  Pt can not make his needs known due to autism. Pt with aide at bedside. Not acute events as per staff. No bowel movements. Pt was npo for possible EGD but was cancelled as pt is high risk.     ROS:  no hematemesis  no bowel movements  no blood per rectum    PAST MEDICAL & SURGICAL HISTORY:  Mental retardation  Aggressiveness  Agitation  Nonverbal  Autism  Bipolar affective disorder  Schizophrenia, paranoid type  GERD (gastroesophageal reflux disease)  Hypertension  Hyperlipidemia  Hematuria  BPH (benign prostatic hypertrophy) with urinary obstruction  Fracture of left malleolus: repaired in 2018    MEDICATIONS  (STANDING):  fluticasone propionate 50 MICROgram(s)/spray Nasal Spray 1 Spray(s) Both Nostrils two times a day  fluvoxaMINE 50 milliGRAM(s) Oral two times a day  loratadine 10 milliGRAM(s) Oral daily  pantoprazole Infusion 8 mG/Hr (10 mL/Hr) IV Continuous <Continuous>  risperiDONE   Tablet 2 milliGRAM(s) Oral two times a day  simvastatin 20 milliGRAM(s) Oral at bedtime  sodium chloride 0.9%. 1000 milliLiter(s) (75 mL/Hr) IV Continuous <Continuous>  sucralfate 1 Gram(s) Oral two times a day  tamsulosin 0.4 milliGRAM(s) Oral at bedtime    EXAM:  Vital Signs Last 24 Hrs  T(C): 36.7 (2019 08:07), Max: 37.9 (2019 17:55)  T(F): 98 (2019 08:07), Max: 100.2 (2019 17:55)  HR: 72 (2019 10:00) (63 - 120)  BP: 129/67 (2019 10:00) (88/49 - 130/81)  BP(mean): 83 (2019 10:00) (59 - 85)  RR: 16 (2019 10:00) (15 - 24)  SpO2: 98% (2019 10:00) (96% - 100%)     @ 07: @ 07:00  --------------------------------------------------------  IN: 1577 mL / OUT: 1750 mL / NET: -173 mL     @ 07: @ 10:59  --------------------------------------------------------  IN: 170 mL / OUT: 0 mL / NET: 170 mL    PHYSICAL EXAM:  Constitutional: awake laying in bed. aide at bedside. nad.   Eyes: eyes open.   ENMT: patent nares, moist mucus membranes  Neck: supple  Respiratory: bilaterally clear to auscultation, no wheezing, no rhonchi, no crackles, no decreased air entry  Cardiovascular: s1s2, rrr, no murmurs.   Gastrointestinal: soft, non tender, no rebound, no guarding.   Extremities: no edema.   Neurological: alert   Skin: intact no rash, warm to touch.     LABS:  Urinalysis Basic - ( 2019 10:37 )  Color: Yellow / Appearance: Clear / S.020 / pH: x  Gluc: x / Ketone: Trace  / Bili: Negative / Urobili: Negative mg/dL   Blood: x / Protein: 15 mg/dL / Nitrite: Negative   Leuk Esterase: Negative / RBC: 0-2 /HPF / WBC 3-5   Sq Epi: x / Non Sq Epi: Few / Bacteria: Few    PT/INR - ( 2019 05:41 )   PT: 14.5 sec;   INR: 1.32 ratio    PTT - ( 2019 10:37 )  PTT:33.5 sec  LIVER FUNCTIONS - ( 2019 10:37 )  Alb: 3.1 g/dL / Pro: 6.6 g/dL / ALK PHOS: 116 U/L / ALT: 17 U/L DA / AST: 18 U/L / GGT: x                               8.2    11.37 )-----------( 190      ( 2019 05:41 )             24.5   -  144  |  112<H>  |  18  ----------------------------<  113<H>  3.8   |  25  |  0.72  Ca    8.6      2019 05:41  TPro  6.6  /  Alb  3.1<L>  /  TBili  0.1<L>  /  DBili  x   /  AST  18  /  ALT  17  /  AlkPhos  116      Chart reviewed.   Labs reviewed.  Imaging reviewed.   Plan discussed with consultants.

## 2019-01-26 NOTE — PROGRESS NOTE ADULT - ASSESSMENT
62 yo male who presents to the ED with a cc of fall.  PMHx of aggressiveness, agitation, autism, bipolar affective disorder, BPH, GERD, hematuria, HLD, HTN, developmental delay, schizophrenia, paranoid type.  Pt is non verbal and is unable to provide history.  Brought in for weakness. Found to have acute anemia secondary to GI bleed.      Problem/Plan - 1:  ·  Problem: Gastrointestinal hemorrhage, unspecified gastrointestinal hemorrhage type.  Plan: may be due to use of aspirin.  Cw SPCU  egd cancelled due to high risk.   sp 3 units PRBC. hgb improved to 8.2. Hemodynamically stable. Cw IVF's.  repeat cbc at 1pm.   Gi to follow up. Will resume liquid diet.   Cw protonix drip.   protonix 40mg ivp q12.     Problem/Plan - 2:  ·  Problem: Anemia due to acute blood loss.  Plan: transfuse prn.  hematology consult .   Iron studies pending. repeat US doppler of lower extremities pending. Pt with hx of prior dvt. Hold xarelto.      Problem/Plan - 3:  ·  Problem: Mental retardation.   supportive care.      Problem/Plan - 4:  ·  Problem: Bipolar affective disorder.      Problem/Plan - 5:  ·  Problem: Schizophrenia, paranoid type.  Plan: risperidone.      Problem/Plan - 6:  Problem: Other hyperlipidemia. Plan: simvastatin.     Problem/Plan - 7:  ·  Problem: Autism.   supportive care.     -DVT ppx:  Cw scd's 62 yo male who presents to the ED with a cc of fall.  PMHx of aggressiveness, agitation, autism, bipolar affective disorder, BPH, GERD, hematuria, HLD, HTN, developmental delay, schizophrenia, paranoid type.  Pt is non verbal and is unable to provide history.  Brought in for weakness. Found to have acute anemia secondary to GI bleed.      Problem/Plan - 1:  ·  Problem: Gastrointestinal hemorrhage, unspecified gastrointestinal hemorrhage type.  Plan: may be due to use of aspirin.  Cw SPCU  egd cancelled due to high risk.   sp 3 units PRBC. hgb improved to 8.2. Hemodynamically stable. Cw IVF's.  repeat cbc at 1pm.   Gi to follow up. Will resume liquid diet.   Cw protonix drip.   protonix 40mg ivp q12.     Problem/Plan - 2:  ·  Problem: Anemia due to acute blood loss.  Plan: transfuse prn.  hematology consult .   Iron studies pending. repeat US doppler of lower extremities pending. Pt with hx of prior dvt. Hold xarelto.      Problem/Plan - 3:  ·  Problem: Mental retardation.   supportive care.      Problem/Plan - 4:  ·  Problem: Bipolar affective disorder.      Problem/Plan - 5:  ·  Problem: Schizophrenia, paranoid type.  Plan: risperidone.      Problem/Plan - 6:  Problem: Other hyperlipidemia. Plan: simvastatin.     Problem/Plan - 7:  ·  Problem: Autism.   supportive care.     -DVT ppx:  Cw scd's    Addendum:  US doppler for DVT in left femoral, popliteal vein same as prior US. Risk of bleeding out weigh benefits of anticoagulation at this time. SCD removed from Left lower extremity. Discussed with Heme/onc, will keep off anticoagulation as pt with GI bleed sp 3 units PRBC. Will follow up with GI as too when a trial prophylactic anticoagulation dose can be given.

## 2019-01-26 NOTE — PROGRESS NOTE ADULT - SUBJECTIVE AND OBJECTIVE BOX
Date/Time Patient Seen:  		  Referring MD:   Data Reviewed	       Patient is a 63y old  Male who presents with a chief complaint of fall (26 Jan 2019 08:34)      Subjective/HPI     PAST MEDICAL & SURGICAL HISTORY:  Fall (on) (from) other stairs and steps, initial encounter  Mental retardation  Aggressiveness  Agitation  Nonverbal  Autism  Bipolar affective disorder  Schizophrenia, paranoid type  GERD (gastroesophageal reflux disease)  Hypertension  Hyperlipidemia  Hematuria  BPH (benign prostatic hypertrophy) with urinary obstruction  Fracture of left malleolus: repaired in 7/2018  No significant past surgical history        Medication list         MEDICATIONS  (STANDING):  fluticasone propionate 50 MICROgram(s)/spray Nasal Spray 1 Spray(s) Both Nostrils two times a day  fluvoxaMINE 50 milliGRAM(s) Oral two times a day  loratadine 10 milliGRAM(s) Oral daily  pantoprazole Infusion 8 mG/Hr (10 mL/Hr) IV Continuous <Continuous>  risperiDONE   Tablet 2 milliGRAM(s) Oral two times a day  simvastatin 20 milliGRAM(s) Oral at bedtime  sodium chloride 0.9%. 1000 milliLiter(s) (75 mL/Hr) IV Continuous <Continuous>  sucralfate 1 Gram(s) Oral two times a day  tamsulosin 0.4 milliGRAM(s) Oral at bedtime    MEDICATIONS  (PRN):         Vitals log        ICU Vital Signs Last 24 Hrs  T(C): 36.4 (26 Jan 2019 04:04), Max: 37.9 (25 Jan 2019 17:55)  T(F): 97.6 (26 Jan 2019 04:04), Max: 100.2 (25 Jan 2019 17:55)  HR: 85 (26 Jan 2019 08:00) (63 - 120)  BP: 116/61 (26 Jan 2019 08:00) (88/49 - 130/81)  BP(mean): 79 (26 Jan 2019 08:00) (59 - 85)  ABP: --  ABP(mean): --  RR: 22 (26 Jan 2019 08:00) (15 - 24)  SpO2: 97% (26 Jan 2019 08:00) (96% - 100%)           Input and Output:  I&O's Detail    25 Jan 2019 07:01  -  26 Jan 2019 07:00  --------------------------------------------------------  IN:    Oral Fluid: 400 mL    Packed Red Blood Cells: 607 mL    pantoprazole Infusion: 120 mL    sodium chloride 0.9%.: 450 mL  Total IN: 1577 mL    OUT:    Voided: 1750 mL  Total OUT: 1750 mL    Total NET: -173 mL      26 Jan 2019 07:01  -  26 Jan 2019 09:04  --------------------------------------------------------  IN:    pantoprazole Infusion: 20 mL    sodium chloride 0.9%.: 150 mL  Total IN: 170 mL    OUT:  Total OUT: 0 mL    Total NET: 170 mL          Lab Data                        8.2    11.37 )-----------( 190      ( 26 Jan 2019 05:41 )             24.5     01-26    144  |  112<H>  |  18  ----------------------------<  113<H>  3.8   |  25  |  0.72    Ca    8.6      26 Jan 2019 05:41    TPro  6.6  /  Alb  3.1<L>  /  TBili  0.1<L>  /  DBili  x   /  AST  18  /  ALT  17  /  AlkPhos  116  01-25            Review of Systems	      Objective     Physical Examination    heart s1s2  lung dec BS  abd soft      Pertinent Lab findings & Imaging      Tegan:  NO   Adequate UO     I&O's Detail    25 Jan 2019 07:01  -  26 Jan 2019 07:00  --------------------------------------------------------  IN:    Oral Fluid: 400 mL    Packed Red Blood Cells: 607 mL    pantoprazole Infusion: 120 mL    sodium chloride 0.9%.: 450 mL  Total IN: 1577 mL    OUT:    Voided: 1750 mL  Total OUT: 1750 mL    Total NET: -173 mL      26 Jan 2019 07:01  -  26 Jan 2019 09:04  --------------------------------------------------------  IN:    pantoprazole Infusion: 20 mL    sodium chloride 0.9%.: 150 mL  Total IN: 170 mL    OUT:  Total OUT: 0 mL    Total NET: 170 mL               Discussed with:     Cultures:	        Radiology

## 2019-01-27 ENCOUNTER — TRANSCRIPTION ENCOUNTER (OUTPATIENT)
Age: 64
End: 2019-01-27

## 2019-01-27 LAB
FERRITIN SERPL-MCNC: 21 NG/ML — LOW (ref 30–400)
FOLATE SERPL-MCNC: 10.8 NG/ML — SIGNIFICANT CHANGE UP
HCT VFR BLD CALC: 27.5 % — LOW (ref 39–50)
HGB BLD-MCNC: 9 G/DL — LOW (ref 13–17)
MCHC RBC-ENTMCNC: 28.5 PG — SIGNIFICANT CHANGE UP (ref 27–34)
MCHC RBC-ENTMCNC: 32.7 GM/DL — SIGNIFICANT CHANGE UP (ref 32–36)
MCV RBC AUTO: 87 FL — SIGNIFICANT CHANGE UP (ref 80–100)
NRBC # BLD: 0 /100 WBCS — SIGNIFICANT CHANGE UP (ref 0–0)
PLATELET # BLD AUTO: 202 K/UL — SIGNIFICANT CHANGE UP (ref 150–400)
RBC # BLD: 3.16 M/UL — LOW (ref 4.2–5.8)
RBC # FLD: 14.2 % — SIGNIFICANT CHANGE UP (ref 10.3–14.5)
VIT B12 SERPL-MCNC: 337 PG/ML — SIGNIFICANT CHANGE UP (ref 232–1245)
WBC # BLD: 9.37 K/UL — SIGNIFICANT CHANGE UP (ref 3.8–10.5)
WBC # FLD AUTO: 9.37 K/UL — SIGNIFICANT CHANGE UP (ref 3.8–10.5)

## 2019-01-27 PROCEDURE — 99233 SBSQ HOSP IP/OBS HIGH 50: CPT

## 2019-01-27 RX ORDER — PANTOPRAZOLE SODIUM 20 MG/1
40 TABLET, DELAYED RELEASE ORAL
Qty: 0 | Refills: 0 | Status: DISCONTINUED | OUTPATIENT
Start: 2019-01-27 | End: 2019-01-31

## 2019-01-27 RX ORDER — IRON SUCROSE 20 MG/ML
100 INJECTION, SOLUTION INTRAVENOUS EVERY 24 HOURS
Qty: 0 | Refills: 0 | Status: DISCONTINUED | OUTPATIENT
Start: 2019-01-27 | End: 2019-01-27

## 2019-01-27 RX ORDER — IRON SUCROSE 20 MG/ML
100 INJECTION, SOLUTION INTRAVENOUS EVERY 24 HOURS
Qty: 0 | Refills: 0 | Status: COMPLETED | OUTPATIENT
Start: 2019-01-27 | End: 2019-01-29

## 2019-01-27 RX ORDER — PREGABALIN 225 MG/1
1000 CAPSULE ORAL DAILY
Qty: 0 | Refills: 0 | Status: DISCONTINUED | OUTPATIENT
Start: 2019-01-27 | End: 2019-01-31

## 2019-01-27 RX ADMIN — FLUVOXAMINE MALEATE 50 MILLIGRAM(S): 25 TABLET ORAL at 05:35

## 2019-01-27 RX ADMIN — SIMVASTATIN 20 MILLIGRAM(S): 20 TABLET, FILM COATED ORAL at 21:33

## 2019-01-27 RX ADMIN — Medication 1 SPRAY(S): at 05:39

## 2019-01-27 RX ADMIN — IRON SUCROSE 100 MILLIGRAM(S): 20 INJECTION, SOLUTION INTRAVENOUS at 15:07

## 2019-01-27 RX ADMIN — PREGABALIN 1000 MICROGRAM(S): 225 CAPSULE ORAL at 11:38

## 2019-01-27 RX ADMIN — TAMSULOSIN HYDROCHLORIDE 0.4 MILLIGRAM(S): 0.4 CAPSULE ORAL at 21:33

## 2019-01-27 RX ADMIN — Medication 1 GRAM(S): at 05:35

## 2019-01-27 RX ADMIN — PANTOPRAZOLE SODIUM 40 MILLIGRAM(S): 20 TABLET, DELAYED RELEASE ORAL at 17:05

## 2019-01-27 RX ADMIN — Medication 1 SPRAY(S): at 17:05

## 2019-01-27 RX ADMIN — LORATADINE 10 MILLIGRAM(S): 10 TABLET ORAL at 11:37

## 2019-01-27 RX ADMIN — Medication 1 GRAM(S): at 17:05

## 2019-01-27 RX ADMIN — RISPERIDONE 2 MILLIGRAM(S): 4 TABLET ORAL at 17:05

## 2019-01-27 RX ADMIN — FLUVOXAMINE MALEATE 50 MILLIGRAM(S): 25 TABLET ORAL at 17:05

## 2019-01-27 RX ADMIN — RISPERIDONE 2 MILLIGRAM(S): 4 TABLET ORAL at 05:35

## 2019-01-27 NOTE — PROGRESS NOTE ADULT - SUBJECTIVE AND OBJECTIVE BOX
Patient is a 63y old  Male who presents with a chief complaint of fall (2019 08:34)    HPI:  Pt is a 62 yo male who presents to the ED with a cc of fall.  PMHx of aggressiveness, agitation, autism, bipolar affective disorder, BPH, GERD, hematuria, HLD, HTN, developmental delay, schizophrenia, paranoid type.  Pt is non verbal and is unable to provide history.  Per group home reports pt was being helped in the restroom when he suddenly became weak in the legs and dropped to the ground.  The staff reports that they lowered him gently to the floor on his buttocks.  Pt did not strike his head.  He then appeared to be shaking and his lips almost looked to be blue.  Staff also reports that pt appears to be paler then normal.    Of noted pt was admitted to the hospital  and was found to have a left lower ext DVT.  He was placed on Xarelto at that time.  It is unclear if this medication was stopped or if he is still taking this. (2019 15:09)    Today:  Pt can not make his needs known due to autism. Pt with aide at bedside. Not acute events as per staff. Had black tarry stool yesterday. No bowel movements today. Pt was npo for possible EGD but was cancelled as pt is high risk. Pt is tolerating clear liquid diet.      ROS: as per staff.   no hematemesis  no bowel movements  no blood per rectum  no abd pain    PAST MEDICAL & SURGICAL HISTORY:  Mental retardation  Aggressiveness  Agitation  Nonverbal  Autism  Bipolar affective disorder  Schizophrenia, paranoid type  GERD (gastroesophageal reflux disease)  Hypertension  Hyperlipidemia  Hematuria  BPH (benign prostatic hypertrophy) with urinary obstruction  Fracture of left malleolus: repaired in 2018    MEDICATIONS  (STANDING):  fluticasone propionate 50 MICROgram(s)/spray Nasal Spray 1 Spray(s) Both Nostrils two times a day  fluvoxaMINE 50 milliGRAM(s) Oral two times a day  loratadine 10 milliGRAM(s) Oral daily  pantoprazole    Tablet 40 milliGRAM(s) Oral two times a day  risperiDONE   Tablet 2 milliGRAM(s) Oral two times a day  simvastatin 20 milliGRAM(s) Oral at bedtime  sucralfate 1 Gram(s) Oral two times a day  tamsulosin 0.4 milliGRAM(s) Oral at bedtime    EXAM:  Vital Signs Last 24 Hrs  T(C): 36.5 (2019 04:12), Max: 36.9 (2019 00:04)  T(F): 97.7 (2019 04:12), Max: 98.5 (2019 00:04)  HR: 85 (2019 06:00) (62 - 89)  BP: 143/70 (2019 06:00) (89/51 - 143/70)  BP(mean): 90 (2019 06:00) (61 - 90)  RR: 22 (2019 06:00) (12 - 23)  SpO2: 99% (2019 06:00) (96% - 100%)     @ 07:01  -   @ 07:00  --------------------------------------------------------  IN: 2485 mL / OUT: 850 mL / NET: 1635 mL    PHYSICAL EXAM:  Constitutional: awake in bed, nad. follows simple commands. pt says, "ba ba ba."  ENMT: patent nares, moist mucus membranes  Neck: supple  Respiratory: bilaterally clear to auscultation, no wheezing, no rhonchi, no crackles, no decreased air entry  Cardiovascular: s1s2, rrr, no murmurs.   Gastrointestinal: soft, non tender, +bowel sounds, no rebound, no guarding.   Extremities: no edema.   Neurological: alert  Skin: intact no rash, warm to touch.   Musculoskeletal: moves all 4 extremities     LABS:  Urinalysis Basic - ( 2019 10:37 )  Color: Yellow / Appearance: Clear / S.020 / pH: x  Gluc: x / Ketone: Trace  / Bili: Negative / Urobili: Negative mg/dL   Blood: x / Protein: 15 mg/dL / Nitrite: Negative   Leuk Esterase: Negative / RBC: 0-2 /HPF / WBC 3-5   Sq Epi: x / Non Sq Epi: Few / Bacteria: Few    PT/INR - ( 2019 05:41 )   PT: 14.5 sec;   INR: 1.32 ratio    PTT - ( 2019 10:37 )  PTT:33.5 sec  LIVER FUNCTIONS - ( 2019 10:37 )  Alb: 3.1 g/dL / Pro: 6.6 g/dL / ALK PHOS: 116 U/L / ALT: 17 U/L DA / AST: 18 U/L / GGT: x                               9.3    10.79 )-----------( 205      ( 2019 11:56 )             28.0     144  |  112<H>  |  18  ----------------------------<  113<H>  3.8   |  25  |  0.72  Ca    8.6      2019 05:41  TPro  6.6  /  Alb  3.1<L>  /  TBili  0.1<L>  /  DBili  x   /  AST  18  /  ALT  17  /  AlkPhos  116      Chart reviewed.   Labs reviewed.  Imaging reviewed.   Plan discussed with consultants.

## 2019-01-27 NOTE — PROGRESS NOTE ADULT - SUBJECTIVE AND OBJECTIVE BOX
Patient is a 63y old  Male who presents with a chief complaint of Anemia, GIB, DVT (27 Jan 2019 10:16)      BRIEF HOSPITAL COURSE:  62 y/o pmhx MRRD, autism, Bipolar, Schizophrenia, aggressiveness     Events last 24 hours: ***    PAST MEDICAL & SURGICAL HISTORY:  Mental retardation  Aggressiveness  Agitation  Nonverbal  Autism  Bipolar affective disorder  Schizophrenia, paranoid type  GERD (gastroesophageal reflux disease)  Hypertension  Hyperlipidemia  Hematuria  BPH (benign prostatic hypertrophy) with urinary obstruction  Fracture of left malleolus: repaired in 7/2018      Review of Systems:  CONSTITUTIONAL: No fever, chills, or fatigue  EYES: No eye pain, visual disturbances, or discharge  ENMT:  No difficulty hearing, tinnitus, vertigo; No sinus or throat pain  NECK: No pain or stiffness  RESPIRATORY: No cough, wheezing, chills or hemoptysis; No shortness of breath  CARDIOVASCULAR: No chest pain, palpitations, dizziness, or leg swelling  GASTROINTESTINAL: No abdominal or epigastric pain. No nausea, vomiting, or hematemesis; No diarrhea or constipation. No melena or hematochezia.  GENITOURINARY: No dysuria, frequency, hematuria, or incontinence  NEUROLOGICAL: No headaches, memory loss, loss of strength, numbness, or tremors  SKIN: No itching, burning, rashes, or lesions   MUSCULOSKELETAL: No joint pain or swelling; No muscle, back, or extremity pain  PSYCHIATRIC: No depression, anxiety, mood swings, or difficulty sleeping      Medications:    tamsulosin 0.4 milliGRAM(s) Oral at bedtime    loratadine 10 milliGRAM(s) Oral daily    fluvoxaMINE 50 milliGRAM(s) Oral two times a day  risperiDONE   Tablet 2 milliGRAM(s) Oral two times a day        pantoprazole    Tablet 40 milliGRAM(s) Oral two times a day  sucralfate 1 Gram(s) Oral two times a day      simvastatin 20 milliGRAM(s) Oral at bedtime    cyanocobalamin Injectable 1000 MICROGram(s) IntraMuscular daily  iron sucrose Injectable 100 milliGRAM(s) IV Push every 24 hours      fluticasone propionate 50 MICROgram(s)/spray Nasal Spray 1 Spray(s) Both Nostrils two times a day            ICU Vital Signs Last 24 Hrs  T(C): 36.8 (27 Jan 2019 21:01), Max: 36.9 (27 Jan 2019 00:04)  T(F): 98.2 (27 Jan 2019 21:01), Max: 98.5 (27 Jan 2019 00:04)  HR: 77 (27 Jan 2019 20:00) (62 - 93)  BP: 127/71 (27 Jan 2019 20:00) (100/56 - 144/69)  BP(mean): 88 (27 Jan 2019 20:00) (71 - 95)  ABP: --  ABP(mean): --  RR: 19 (27 Jan 2019 20:00) (14 - 23)  SpO2: 97% (27 Jan 2019 20:00) (96% - 100%)          I&O's Detail    26 Jan 2019 07:01  -  27 Jan 2019 07:00  --------------------------------------------------------  IN:    Oral Fluid: 700 mL    pantoprazole Infusion: 210 mL    sodium chloride 0.9%: 1575 mL  Total IN: 2485 mL    OUT:    Voided: 850 mL  Total OUT: 850 mL    Total NET: 1635 mL      27 Jan 2019 07:01  -  27 Jan 2019 22:03  --------------------------------------------------------  IN:    Oral Fluid: 1070 mL    pantoprazole Infusion: 10 mL    sodium chloride 0.9%: 75 mL  Total IN: 1155 mL    OUT:    Voided: 300 mL  Total OUT: 300 mL    Total NET: 855 mL            LABS:                        9.0    9.37  )-----------( 202      ( 27 Jan 2019 08:36 )             27.5     01-26    144  |  112<H>  |  18  ----------------------------<  113<H>  3.8   |  25  |  0.72    Ca    8.6      26 Jan 2019 05:41            CAPILLARY BLOOD GLUCOSE        PT/INR - ( 26 Jan 2019 05:41 )   PT: 14.5 sec;   INR: 1.32 ratio             CULTURES:  Culture Results:   <10,000 CFU/ml Normal Urogenital sai present (01-25-19 @ 16:02)  Culture Results:   No growth to date. (01-25-19 @ 15:00)  Culture Results:   No growth to date. (01-25-19 @ 15:00)      Physical Examination:    General: No acute distress.  Alert, oriented, interactive, nonfocal    HEENT: Pupils equal, reactive to light.  Symmetric.    PULM: Clear to auscultation bilaterally, no significant sputum production    CVS: Regular rate and rhythm, no murmurs, rubs, or gallops    ABD: Soft, nondistended, nontender, normoactive bowel sounds, no masses    EXT: No edema, nontender    SKIN: Warm and well perfused, no rashes noted.    NEURO: A&Ox3, strength 5/5 all extremities, cranial nerves grossly intact, no focal deficits    RADIOLOGY: ***    CRITICAL CARE TIME SPENT: ***  Evaluating/treating patient, reviewing data/labs/imaging, discussing case with multidisciplinary team, discussing plan/goals of care with patient/family. Non-inclusive of procedure time. Patient is a 63y old  Male who presents with a chief complaint of Anemia, GIB, DVT (27 Jan 2019 10:16)      BRIEF HOSPITAL COURSE:  64 y/o pmhx MRRD, autism, Bipolar, Schizophrenia, aggressiveness, BPH, GERD, HTN, HLD admitted 1/26 admitted w/ acute blood loss anemia related to possible GI bleed, s/p 3 PRBC. Was fecal occult positive     Events last 24 hours: Hg remains stable after 3 PRBC. No evidence of GI bleed w/ melena or coffee ground emesis. Plan for EGD tomorrow morning w/ GI.     PAST MEDICAL & SURGICAL HISTORY:  Mental retardation  Aggressiveness  Agitation  Nonverbal  Autism  Bipolar affective disorder  Schizophrenia, paranoid type  GERD (gastroesophageal reflux disease)  Hypertension  Hyperlipidemia  Hematuria  BPH (benign prostatic hypertrophy) with urinary obstruction  Fracture of left malleolus: repaired in 7/2018      Review of Systems:  Unable to obtain due clinical condition.     Medications:  tamsulosin 0.4 milliGRAM(s) Oral at bedtime  loratadine 10 milliGRAM(s) Oral daily  fluvoxaMINE 50 milliGRAM(s) Oral two times a day  risperiDONE   Tablet 2 milliGRAM(s) Oral two times a day        pantoprazole    Tablet 40 milliGRAM(s) Oral two times a day  sucralfate 1 Gram(s) Oral two times a day      simvastatin 20 milliGRAM(s) Oral at bedtime    cyanocobalamin Injectable 1000 MICROGram(s) IntraMuscular daily  iron sucrose Injectable 100 milliGRAM(s) IV Push every 24 hours      fluticasone propionate 50 MICROgram(s)/spray Nasal Spray 1 Spray(s) Both Nostrils two times a day            ICU Vital Signs Last 24 Hrs  T(C): 36.8 (27 Jan 2019 21:01), Max: 36.9 (27 Jan 2019 00:04)  T(F): 98.2 (27 Jan 2019 21:01), Max: 98.5 (27 Jan 2019 00:04)  HR: 77 (27 Jan 2019 20:00) (62 - 93)  BP: 127/71 (27 Jan 2019 20:00) (100/56 - 144/69)  BP(mean): 88 (27 Jan 2019 20:00) (71 - 95)  RR: 19 (27 Jan 2019 20:00) (14 - 23)  SpO2: 97% (27 Jan 2019 20:00) (96% - 100%)          I&O's Detail    26 Jan 2019 07:01  -  27 Jan 2019 07:00  --------------------------------------------------------  IN:    Oral Fluid: 700 mL    pantoprazole Infusion: 210 mL    sodium chloride 0.9%: 1575 mL  Total IN: 2485 mL    OUT:    Voided: 850 mL  Total OUT: 850 mL    Total NET: 1635 mL      27 Jan 2019 07:01  -  27 Jan 2019 22:03  --------------------------------------------------------  IN:    Oral Fluid: 1070 mL    pantoprazole Infusion: 10 mL    sodium chloride 0.9%: 75 mL  Total IN: 1155 mL    OUT:    Voided: 300 mL  Total OUT: 300 mL    Total NET: 855 mL            LABS:                        9.0    9.37  )-----------( 202      ( 27 Jan 2019 08:36 )             27.5     01-26    144  |  112<H>  |  18  ----------------------------<  113<H>  3.8   |  25  |  0.72    Ca    8.6      26 Jan 2019 05:41            CAPILLARY BLOOD GLUCOSE        PT/INR - ( 26 Jan 2019 05:41 )   PT: 14.5 sec;   INR: 1.32 ratio             CULTURES:  Culture Results:   <10,000 CFU/ml Normal Urogenital sai present (01-25-19 @ 16:02)  Culture Results:   No growth to date. (01-25-19 @ 15:00)  Culture Results:   No growth to date. (01-25-19 @ 15:00)      Physical Examination:    General: No acute distress     HEENT: Pupils equal, reactive to light.  Symmetric.    PULM: Clear to auscultation bilaterally, no significant sputum production    CVS: Regular rate and rhythm, no murmurs, rubs, or gallops. +S1/S2     ABD: Soft, nondistended, nontender, normoactive bowel sounds, no masses    EXT: No edema, nontender    SKIN: Warm and well perfused, no rashes noted.    NEURO: Awake/ Alert, Able to move all extremities Does not follow commands     RADIOLOGY: < from: US Duplex Venous Lower Ext Complete, Bilateral (01.26.19 @ 11:02) >    EXAM:  US DPLX LWR EXT VEINS COMPL BI                                  PROCEDURE DATE:  01/26/2019          INTERPRETATION:      REASON FOR EXAM:  63 years hx DVT  hx DVT.         TECHNIQUE:   Gray-scale imaging and Doppler sonographic evaluation,   including duplex spectral analysis and qualitative color flow sonography   of bilateral lower extremity venous system was performed.        COMPARISON:   None.    FINDINGS:    There is spontaneous flow , augmentation, and compression of the   bilateral common femoral, right superficial femoral and popliteal veins.   There is no compression left common femoral and popliteal veins   consistent with deep venous thrombus. The visualized segments of the   proximal calf veins are patent.    IMPRESSION:  Findings consistent with deep venous thrombosis of left femoral and   popliteal veins.    < end of copied text >      TIME SPENT: 31 min  Evaluating/treating patient, reviewing data/labs/imaging, discussing case with multidisciplinary team, discussing plan/goals of care with patient/family. Non-inclusive of procedure time.

## 2019-01-27 NOTE — PROGRESS NOTE ADULT - SUBJECTIVE AND OBJECTIVE BOX
[INTERVAL HX: ]  Patient seen and examined;  Chart reviewed and events noted;   more alert. No noted events from RN staff.   does not speak/averbal Non-communicative in general      MEDICATIONS  (STANDING):  fluticasone propionate 50 MICROgram(s)/spray Nasal Spray 1 Spray(s) Both Nostrils two times a day  fluvoxaMINE 50 milliGRAM(s) Oral two times a day  loratadine 10 milliGRAM(s) Oral daily  pantoprazole    Tablet 40 milliGRAM(s) Oral two times a day  risperiDONE   Tablet 2 milliGRAM(s) Oral two times a day  simvastatin 20 milliGRAM(s) Oral at bedtime  sucralfate 1 Gram(s) Oral two times a day  tamsulosin 0.4 milliGRAM(s) Oral at bedtime    MEDICATIONS  (PRN):      Vital Signs Last 24 Hrs  T(C): 36.7 (27 Jan 2019 09:03), Max: 36.9 (27 Jan 2019 00:04)  T(F): 98.1 (27 Jan 2019 09:03), Max: 98.5 (27 Jan 2019 00:04)  HR: 80 (27 Jan 2019 09:03) (62 - 89)  BP: 104/61 (27 Jan 2019 09:03) (89/51 - 143/70)  BP(mean): 74 (27 Jan 2019 09:03) (61 - 90)  RR: 20 (27 Jan 2019 09:03) (12 - 23)  SpO2: 97% (27 Jan 2019 09:03) (96% - 100%)    [PHYSICAL EXAM]  General: adult in NAD,  WN,  WD.  HEENT: clear oropharynx, anicteric sclera, pink conjunctivae.  Neck: supple, no masses.  CV: normal S1S2, no murmur, no rubs, no gallops.  Lungs: clear to auscultation, no wheezes, no rales, no rhonchi.  Abdomen: soft, non-tender, non-distended, no hepatosplenomegaly, normal BS, no guarding.  Ext: no clubbing, no cyanosis, no edema.  Skin: no rashes,  no petechiae, no venous stasis changes.  Neuro: alert and oriented X0, no focal motor deficits.  LN: no NOLAN.      [LABS:]                        9.0    9.37  )-----------( 202      ( 27 Jan 2019 08:36 )             27.5     01-26    144  |  112<H>  |  18  ----------------------------<  113<H>  3.8   |  25  |  0.72    Ca    8.6      26 Jan 2019 05:41    TPro  6.6  /  Alb  3.1<L>  /  TBili  0.1<L>  /  DBili  x   /  AST  18  /  ALT  17  /  AlkPhos  116  01-25    PT/INR - ( 26 Jan 2019 05:41 )   PT: 14.5 sec;   INR: 1.32 ratio         PTT - ( 25 Jan 2019 10:37 )  PTT:33.5 sec      [RADIOLOGY STUDIES:]

## 2019-01-27 NOTE — PROGRESS NOTE ADULT - ASSESSMENT
64 y/o pmhx MRRD, autism, Bipolar, Schizophrenia, aggressiveness, BPH, GERD, HTN, HLD admitted 1/26 admitted w/ acute blood loss anemia related to possible GI bleed, s/p 3 PRBC.     Plan  Neuro: MRRD, autism, Bipolar, Schizophrenia, aggressiveness, Supportive care. c/w Risperdal  and   CV: HTN, hold antihypertensives in setting of hypotension from acute blood loss. Remains Normotensive   - HLD, c/w statin   Resp: Stable  GI: ? GI Bleed, no active signs of bleeding. Protonix BID. Carafate. NPO @ midnight for EGD in am. f/u GI recommendations  Heme: Acute blood anemia, s/p 3PRBC. Hg has remained stable. repeat CBC in am. Transfuse PRN for Hg <7.

## 2019-01-27 NOTE — PROGRESS NOTE ADULT - SUBJECTIVE AND OBJECTIVE BOX
Date/Time Patient Seen:  		  Referring MD:   Data Reviewed	       Patient is a 63y old  Male who presents with a chief complaint of Weakness (27 Jan 2019 08:25)      Subjective/HPI     PAST MEDICAL & SURGICAL HISTORY:  Fall (on) (from) other stairs and steps, initial encounter  Mental retardation  Aggressiveness  Agitation  Nonverbal  Autism  Bipolar affective disorder  Schizophrenia, paranoid type  GERD (gastroesophageal reflux disease)  Hypertension  Hyperlipidemia  Hematuria  BPH (benign prostatic hypertrophy) with urinary obstruction  Fracture of left malleolus: repaired in 7/2018  No significant past surgical history        Medication list         MEDICATIONS  (STANDING):  fluticasone propionate 50 MICROgram(s)/spray Nasal Spray 1 Spray(s) Both Nostrils two times a day  fluvoxaMINE 50 milliGRAM(s) Oral two times a day  loratadine 10 milliGRAM(s) Oral daily  pantoprazole    Tablet 40 milliGRAM(s) Oral two times a day  risperiDONE   Tablet 2 milliGRAM(s) Oral two times a day  simvastatin 20 milliGRAM(s) Oral at bedtime  sucralfate 1 Gram(s) Oral two times a day  tamsulosin 0.4 milliGRAM(s) Oral at bedtime    MEDICATIONS  (PRN):         Vitals log        ICU Vital Signs Last 24 Hrs  T(C): 36.5 (27 Jan 2019 04:12), Max: 36.9 (27 Jan 2019 00:04)  T(F): 97.7 (27 Jan 2019 04:12), Max: 98.5 (27 Jan 2019 00:04)  HR: 85 (27 Jan 2019 06:00) (62 - 89)  BP: 143/70 (27 Jan 2019 06:00) (89/51 - 143/70)  BP(mean): 90 (27 Jan 2019 06:00) (61 - 90)  ABP: --  ABP(mean): --  RR: 22 (27 Jan 2019 06:00) (12 - 23)  SpO2: 99% (27 Jan 2019 06:00) (96% - 100%)           Input and Output:  I&O's Detail    26 Jan 2019 07:01  -  27 Jan 2019 07:00  --------------------------------------------------------  IN:    Oral Fluid: 700 mL    pantoprazole Infusion: 210 mL    sodium chloride 0.9%: 1575 mL  Total IN: 2485 mL    OUT:    Voided: 850 mL  Total OUT: 850 mL    Total NET: 1635 mL          Lab Data                        9.3    10.79 )-----------( 205      ( 26 Jan 2019 11:56 )             28.0     01-26    144  |  112<H>  |  18  ----------------------------<  113<H>  3.8   |  25  |  0.72    Ca    8.6      26 Jan 2019 05:41    TPro  6.6  /  Alb  3.1<L>  /  TBili  0.1<L>  /  DBili  x   /  AST  18  /  ALT  17  /  AlkPhos  116  01-25            Review of Systems	      Objective     Physical Examination    heart s1s2  lung dec BS      Pertinent Lab findings & Imaging      Tegan:  NO   Adequate UO     I&O's Detail    26 Jan 2019 07:01  -  27 Jan 2019 07:00  --------------------------------------------------------  IN:    Oral Fluid: 700 mL    pantoprazole Infusion: 210 mL    sodium chloride 0.9%: 1575 mL  Total IN: 2485 mL    OUT:    Voided: 850 mL  Total OUT: 850 mL    Total NET: 1635 mL               Discussed with:     Cultures:	        Radiology

## 2019-01-27 NOTE — PROGRESS NOTE ADULT - PROBLEM SELECTOR PLAN 1
hiatal hernia - anemia - GI bleed eval   PPI  EGD when optimal conditions  serial Hgb  keep MAP > 60  keep sat > 88 pct  out of bed as tolerated  on PO clears  will follow

## 2019-01-27 NOTE — PROGRESS NOTE ADULT - ASSESSMENT
62 yo male who presents to the ED with a cc of fall.  PMHx of aggressiveness, agitation, autism, bipolar affective disorder, BPH, GERD, hematuria, HLD, HTN, developmental delay, schizophrenia, paranoid type.  Pt is non verbal and is unable to provide history.  Brought in for weakness. Found to have acute anemia secondary to GI bleed and +dvt.      Problem/Plan - 1:  ·  Problem: Gastrointestinal hemorrhage, unspecified gastrointestinal hemorrhage type.    Plan:   Cw SPCU.   may be due to use of aspirin/xarelto.  pt with black tarry stool yesterday.   egd cancelled due to high risk.   sp 3 units PRBC. hgb improved to 8.2. Hemodynamically stable. dc IVF's. cbc today pending.   Gi to follow up. Cw liquid diet. Will advance as per GI.   Cw protonix.      Problem/Plan - 2:  ·  Problem: Anemia due to acute blood loss.  Plan: transfuse prn.  hematology consult .   Iron studies pending noted.     Problem 3: +left femoral/popliteal DVT.   US doppler +ve for DVT in left femoral, popliteal vein same as prior US. Risk of bleeding out weigh benefits of anticoagulation at this time. SCD removed from Left lower extremity. Discussed with Heme/onc, will keep off anticoagulation as pt with current GI bleed sp 3 units PRBC. Will follow up with GI as too when a trial prophylactic anticoagulation dose can be given. Was on xarelto.      Problem/Plan - 4:  ·  Problem: Mental retardation.   supportive care.      Problem/Plan - 5:  ·  Problem: Bipolar affective disorder.      Problem/Plan - 6:  ·  Problem: Schizophrenia, paranoid type.  Plan: risperidone.      Problem/Plan - 7:  Problem: Other hyperlipidemia. Plan: simvastatin.     Problem/Plan - 8:  ·  Problem: Autism.   supportive care.     -DVT ppx:  Cw scd's on right lower extremity only.

## 2019-01-27 NOTE — PROGRESS NOTE ADULT - SUBJECTIVE AND OBJECTIVE BOX
[facility]  Yovani Bergman PA-C  [address] [place] [address]  [phone]      INTERVAL HPI/OVERNIGHT EVENTS:    brown bm    MEDICATIONS  (STANDING):  cyanocobalamin Injectable 1000 MICROGram(s) IntraMuscular daily  fluticasone propionate 50 MICROgram(s)/spray Nasal Spray 1 Spray(s) Both Nostrils two times a day  fluvoxaMINE 50 milliGRAM(s) Oral two times a day  iron sucrose Injectable 100 milliGRAM(s) IV Push every 24 hours  loratadine 10 milliGRAM(s) Oral daily  pantoprazole    Tablet 40 milliGRAM(s) Oral two times a day  risperiDONE   Tablet 2 milliGRAM(s) Oral two times a day  simvastatin 20 milliGRAM(s) Oral at bedtime  sucralfate 1 Gram(s) Oral two times a day  tamsulosin 0.4 milliGRAM(s) Oral at bedtime    MEDICATIONS  (PRN):      Allergies    No Known Allergies    Intolerances        ROS:   unable to obtain        PHYSICAL EXAM:   Vital Signs:  Vital Signs Last 24 Hrs  T(C): 36.5 (2019 15:56), Max: 36.9 (2019 00:04)  T(F): 97.7 (2019 15:56), Max: 98.5 (2019 00:04)  HR: 87 (2019 14:41) (62 - 87)  BP: 126/80 (2019 14:41) (89/51 - 143/70)  BP(mean): 95 (2019 14:41) (61 - 95)  RR: 19 (2019 14:41) (14 - 23)  SpO2: 100% (2019 14:41) (96% - 100%)  Daily     Daily Weight in k.2 (2019 06:18)    nad  confused  frail  non toxic  soft, nt  no edema       LABS:                        9.0    9.37  )-----------( 202      ( 2019 08:36 )             27.5     01-26    144  |  112<H>  |  18  ----------------------------<  113<H>  3.8   |  25  |  0.72    Ca    8.6      2019 05:41      PT/INR - ( 2019 05:41 )   PT: 14.5 sec;   INR: 1.32 ratio               RADIOLOGY & ADDITIONAL TESTS:

## 2019-01-27 NOTE — PROGRESS NOTE ADULT - ASSESSMENT
[ASSESSMENT and  PLAN]  64yo hx MR, non-verbal, HTN, HLD, BPH, osteoporosis.   Dx 11/2018 with DVT with left leg swelling/duration of unknown. Possible provoking hx with surgery in 7/2018 for left medial malleolus fracture s/p fall.    Oupt Venous Duplex leg at [facility]: LLE DVT mid thigh to calf involving mid and distal femoral vein, popliteal and visualized portions of the tibial veins in the calf.  Was recommend 3 months of anticoagulation, with Xarelto.     Now admitted with FOBT+ and anemia, Hgb 6.  s/p PRBC txfusion, Hgb peak 9.3==>9.0    Iron deficient. Ferritin 21  B12 deficient liekly B12vit 337  Folate adequate.     Repeat LE duplex stable findings.       RECOMMEND:   Monitor CBC.   GI eval/followup.   Check anemia labs.   Hold Xarelto, pending stable Hgb. When stable start Lovenox prophylaxixs dose. If no bleeding to reconsider full dose unless contraindication, eg large ulcer or high risk for re-bleed. .      Cont psychiatric meds.     B12 1000mcg daily x10d, then change to PO.  Venofer 100mg x3d doses.

## 2019-01-28 ENCOUNTER — RESULT REVIEW (OUTPATIENT)
Age: 64
End: 2019-01-28

## 2019-01-28 ENCOUNTER — APPOINTMENT (OUTPATIENT)
Dept: ORTHOPEDIC SURGERY | Facility: CLINIC | Age: 64
End: 2019-01-28

## 2019-01-28 LAB
ANION GAP SERPL CALC-SCNC: 9 MMOL/L — SIGNIFICANT CHANGE UP (ref 5–17)
BUN SERPL-MCNC: 13 MG/DL — SIGNIFICANT CHANGE UP (ref 7–23)
CALCIUM SERPL-MCNC: 9 MG/DL — SIGNIFICANT CHANGE UP (ref 8.4–10.5)
CHLORIDE SERPL-SCNC: 108 MMOL/L — SIGNIFICANT CHANGE UP (ref 96–108)
CO2 SERPL-SCNC: 27 MMOL/L — SIGNIFICANT CHANGE UP (ref 22–31)
CREAT SERPL-MCNC: 0.67 MG/DL — SIGNIFICANT CHANGE UP (ref 0.5–1.3)
GLUCOSE SERPL-MCNC: 101 MG/DL — HIGH (ref 70–99)
HCT VFR BLD CALC: 24.8 % — LOW (ref 39–50)
HGB BLD-MCNC: 8.4 G/DL — LOW (ref 13–17)
MCHC RBC-ENTMCNC: 28.9 PG — SIGNIFICANT CHANGE UP (ref 27–34)
MCHC RBC-ENTMCNC: 33.9 GM/DL — SIGNIFICANT CHANGE UP (ref 32–36)
MCV RBC AUTO: 85.2 FL — SIGNIFICANT CHANGE UP (ref 80–100)
NRBC # BLD: 0 /100 WBCS — SIGNIFICANT CHANGE UP (ref 0–0)
PLATELET # BLD AUTO: 220 K/UL — SIGNIFICANT CHANGE UP (ref 150–400)
POTASSIUM SERPL-MCNC: 3.6 MMOL/L — SIGNIFICANT CHANGE UP (ref 3.5–5.3)
POTASSIUM SERPL-SCNC: 3.6 MMOL/L — SIGNIFICANT CHANGE UP (ref 3.5–5.3)
RBC # BLD: 2.91 M/UL — LOW (ref 4.2–5.8)
RBC # FLD: 14.2 % — SIGNIFICANT CHANGE UP (ref 10.3–14.5)
SODIUM SERPL-SCNC: 144 MMOL/L — SIGNIFICANT CHANGE UP (ref 135–145)
WBC # BLD: 8.6 K/UL — SIGNIFICANT CHANGE UP (ref 3.8–10.5)
WBC # FLD AUTO: 8.6 K/UL — SIGNIFICANT CHANGE UP (ref 3.8–10.5)

## 2019-01-28 PROCEDURE — 88305 TISSUE EXAM BY PATHOLOGIST: CPT | Mod: 26

## 2019-01-28 PROCEDURE — 99233 SBSQ HOSP IP/OBS HIGH 50: CPT

## 2019-01-28 PROCEDURE — 88312 SPECIAL STAINS GROUP 1: CPT | Mod: 26

## 2019-01-28 PROCEDURE — 88342 IMHCHEM/IMCYTCHM 1ST ANTB: CPT | Mod: 26

## 2019-01-28 RX ORDER — SODIUM CHLORIDE 9 MG/ML
1000 INJECTION, SOLUTION INTRAVENOUS
Qty: 0 | Refills: 0 | Status: DISCONTINUED | OUTPATIENT
Start: 2019-01-28 | End: 2019-01-28

## 2019-01-28 RX ORDER — ENOXAPARIN SODIUM 100 MG/ML
40 INJECTION SUBCUTANEOUS DAILY
Qty: 0 | Refills: 0 | Status: DISCONTINUED | OUTPATIENT
Start: 2019-01-28 | End: 2019-01-29

## 2019-01-28 RX ADMIN — TAMSULOSIN HYDROCHLORIDE 0.4 MILLIGRAM(S): 0.4 CAPSULE ORAL at 22:44

## 2019-01-28 RX ADMIN — Medication 1 GRAM(S): at 17:21

## 2019-01-28 RX ADMIN — Medication 1 SPRAY(S): at 17:22

## 2019-01-28 RX ADMIN — ENOXAPARIN SODIUM 40 MILLIGRAM(S): 100 INJECTION SUBCUTANEOUS at 22:44

## 2019-01-28 RX ADMIN — FLUVOXAMINE MALEATE 50 MILLIGRAM(S): 25 TABLET ORAL at 05:25

## 2019-01-28 RX ADMIN — IRON SUCROSE 100 MILLIGRAM(S): 20 INJECTION, SOLUTION INTRAVENOUS at 17:21

## 2019-01-28 RX ADMIN — Medication 1 GRAM(S): at 05:25

## 2019-01-28 RX ADMIN — SODIUM CHLORIDE 50 MILLILITER(S): 9 INJECTION, SOLUTION INTRAVENOUS at 10:35

## 2019-01-28 RX ADMIN — LORATADINE 10 MILLIGRAM(S): 10 TABLET ORAL at 17:29

## 2019-01-28 RX ADMIN — Medication 1 SPRAY(S): at 05:24

## 2019-01-28 RX ADMIN — PANTOPRAZOLE SODIUM 40 MILLIGRAM(S): 20 TABLET, DELAYED RELEASE ORAL at 05:25

## 2019-01-28 RX ADMIN — SODIUM CHLORIDE 100 MILLILITER(S): 9 INJECTION, SOLUTION INTRAVENOUS at 16:49

## 2019-01-28 RX ADMIN — RISPERIDONE 2 MILLIGRAM(S): 4 TABLET ORAL at 17:21

## 2019-01-28 RX ADMIN — PANTOPRAZOLE SODIUM 40 MILLIGRAM(S): 20 TABLET, DELAYED RELEASE ORAL at 17:21

## 2019-01-28 RX ADMIN — SIMVASTATIN 20 MILLIGRAM(S): 20 TABLET, FILM COATED ORAL at 22:44

## 2019-01-28 RX ADMIN — PREGABALIN 1000 MICROGRAM(S): 225 CAPSULE ORAL at 11:10

## 2019-01-28 RX ADMIN — RISPERIDONE 2 MILLIGRAM(S): 4 TABLET ORAL at 05:25

## 2019-01-28 RX ADMIN — FLUVOXAMINE MALEATE 50 MILLIGRAM(S): 25 TABLET ORAL at 17:30

## 2019-01-28 NOTE — PROGRESS NOTE ADULT - PROBLEM SELECTOR PLAN 1
hiatal hernia, GI Bleed, Anemia, MRDD and Autism  s/p PRBC  I and O  IVF  pt is NPO for EGD this am  serial Hgb, monitor vs and HD and sat, keep MAP > 60  keep Hgb > 7 - 8  PPI  GI following  cont SPCU supportive care and regimen  will follow  DVT p - with seq teds  prognosis guarded

## 2019-01-28 NOTE — PROGRESS NOTE ADULT - ASSESSMENT
[ASSESSMENT and  PLAN]  62yo hx MR, non-verbal, HTN, HLD, BPH, osteoporosis.   Dx 11/2018 with DVT with left leg involving mid-calf, distal femoral, popliteal and tibial veins; the swelling/duration of unknown. Possible provoking hx with surgery in 7/2018 for left medial malleolus fracture s/p fall.    He was recommend 3 months of anticoagulation, with Xarelto.     Now admitted with FOBT+ and anemia, Hgb 6.  s/p PRBC txfusion, Hgb peak 9.3==>9.0 and has remained stable; Iron studies c/w iron deficiency with low normal B12 at 337; started on IV iron (1/27/19-1/29/19)and parenteral B12      RECOMMEND:   complete 3 days of IV iron as ordered  GI eval pending; for EGD today   Hold Xarelto for now in setting of acute GI blood loss; When clinically stable start Lovenox prophylaxis dose. If no bleeding to reconsider full dose unless contraindication, eg large ulcer or high risk for re-bleed. .   monitor CBC and transfuse as needed

## 2019-01-28 NOTE — PROGRESS NOTE ADULT - SUBJECTIVE AND OBJECTIVE BOX
63y year old Male admitted for Patient is a 63y old  Male who presents with a chief complaint of heme consulted for anemia (28 Jan 2019 13:33)        62 yo M with history of mental retardation, autism, bipolar, schizophrenia, HTN, HLD, admitted with anemia from GIB requiring transfusion, had EGD today which revealed gastric and doudenal ulcers.  Patient communication limited but currently denies CP, abdominal pain, difficulty breathing      PMH:  Gastrointestinal hemorrhage  No pertinent family history in first degree relatives  Family history unknown  Handoff  MEWS Score  Fall (on) (from) other stairs and steps, initial encounter  Mental retardation  Aggressiveness  Agitation  Nonverbal  Autism  Bipolar affective disorder  Schizophrenia, paranoid type  GERD (gastroesophageal reflux disease)  Hypertension  Hyperlipidemia  Hematuria  BPH (benign prostatic hypertrophy) with urinary obstruction  GI bleed  GI bleed  DVT (deep venous thrombosis)  Mood disorder  Panniculitis  Leukocytosis  Nonverbal  Hiatal hernia  Lactate blood increase  Autism  Other hyperlipidemia  Schizophrenia, paranoid type  Bipolar affective disorder  Mental retardation  Anemia due to acute blood loss  Gastrointestinal hemorrhage, unspecified gastrointestinal hemorrhage type  Fracture of left malleolus  No significant past surgical history  FELL  71  Autism  GERD (gastroesophageal reflux disease)  Bipolar affective disorder  Anemia  Leukocytosis        MEDICATIONS  (STANDING):  cyanocobalamin Injectable 1000 MICROGram(s) IntraMuscular daily  enoxaparin Injectable 40 milliGRAM(s) SubCutaneous daily  fluticasone propionate 50 MICROgram(s)/spray Nasal Spray 1 Spray(s) Both Nostrils two times a day  fluvoxaMINE 50 milliGRAM(s) Oral two times a day  iron sucrose Injectable 100 milliGRAM(s) IV Push every 24 hours  loratadine 10 milliGRAM(s) Oral daily  pantoprazole    Tablet 40 milliGRAM(s) Oral two times a day  risperiDONE   Tablet 2 milliGRAM(s) Oral two times a day  simvastatin 20 milliGRAM(s) Oral at bedtime  sucralfate 1 Gram(s) Oral two times a day  tamsulosin 0.4 milliGRAM(s) Oral at bedtime    MEDICATIONS  (PRN):      I&O's Summary    27 Jan 2019 07:01  -  28 Jan 2019 07:00  --------------------------------------------------------  IN: 1155 mL / OUT: 300 mL / NET: 855 mL    28 Jan 2019 07:01  -  28 Jan 2019 22:34  --------------------------------------------------------  IN: 400 mL / OUT: 600 mL / NET: -200 mL      ICU Vital Signs Last 24 Hrs  T(C): 37 (28 Jan 2019 20:09), Max: 37.2 (28 Jan 2019 09:08)  T(F): 98.6 (28 Jan 2019 20:09), Max: 99 (28 Jan 2019 09:08)  HR: 72 (28 Jan 2019 20:00) (62 - 103)  BP: 114/82 (28 Jan 2019 20:00) (96/64 - 155/69)  BP(mean): 90 (28 Jan 2019 20:00) (74 - 94)  ABP: --  ABP(mean): --  RR: 21 (28 Jan 2019 20:00) (14 - 21)  SpO2: 98% (28 Jan 2019 20:00) (94% - 100%)      PHYSICAL EXAM:  General:  awake, lying in bed  CV: s1s2 RRR no murmurs  pulm: Clear bilaterally   GI: soft NTND   Skin: pale  Extremities: no periferal edema      01-28    144  |  108  |  13  ----------------------------<  101<H>  3.6   |  27  |  0.67    Ca    9.0      28 Jan 2019 06:44                            8.4    8.60  )-----------( 220      ( 28 Jan 2019 06:44 )             24.8                      62 yo M with history of mental retardation, autism, bipolar, schizophrenia, HTN, HLD, admitted with anemia from GIB requiring transfusion, had EGD today which revealed gastric and doudenal ulcers. hgb this AM 8.4, BP stable,  at 4am otherwise not tachycardic.   - Continue risperdone and fluvoxamine  - Continue simvastatin for HLD  - Continue protonix BID and sucralfate.  Per GI conversation with hospitalist, lovenox OK. Continue iron supplementation.   continue to monitor CBC, transfuse for Hgb <7 or unstable.  - Continue tamsulosin for BPH  - continue fluticasone nasal spray and loratadine  - continue to monitor if SPCU.

## 2019-01-28 NOTE — PROGRESS NOTE ADULT - SUBJECTIVE AND OBJECTIVE BOX
Patient is a 63y old  Male who presents with a chief complaint of heme consulted for anemia (28 Jan 2019 13:33)      SUBJECTIVE / OVERNIGHT EVENTS: no overnight events. Cannot get ROS due to mental status    MEDICATIONS  (STANDING):  cyanocobalamin Injectable 1000 MICROGram(s) IntraMuscular daily  fluticasone propionate 50 MICROgram(s)/spray Nasal Spray 1 Spray(s) Both Nostrils two times a day  fluvoxaMINE 50 milliGRAM(s) Oral two times a day  iron sucrose Injectable 100 milliGRAM(s) IV Push every 24 hours  lactated ringers. 1000 milliLiter(s) (50 mL/Hr) IV Continuous <Continuous>  loratadine 10 milliGRAM(s) Oral daily  pantoprazole    Tablet 40 milliGRAM(s) Oral two times a day  risperiDONE   Tablet 2 milliGRAM(s) Oral two times a day  simvastatin 20 milliGRAM(s) Oral at bedtime  sucralfate 1 Gram(s) Oral two times a day  tamsulosin 0.4 milliGRAM(s) Oral at bedtime    MEDICATIONS  (PRN):      Vital Signs Last 24 Hrs  T(C): 36.4 (28 Jan 2019 16:05), Max: 37.2 (28 Jan 2019 09:08)  T(F): 97.6 (28 Jan 2019 16:05), Max: 99 (28 Jan 2019 09:08)  HR: 62 (28 Jan 2019 16:30) (62 - 103)  BP: 125/60 (28 Jan 2019 16:30) (96/64 - 155/69)  BP(mean): 83 (28 Jan 2019 14:01) (74 - 94)  RR: 16 (28 Jan 2019 16:30) (14 - 23)  SpO2: 100% (28 Jan 2019 16:30) (94% - 100%)  CAPILLARY BLOOD GLUCOSE        I&O's Summary    27 Jan 2019 07:01  -  28 Jan 2019 07:00  --------------------------------------------------------  IN: 1155 mL / OUT: 300 mL / NET: 855 mL    28 Jan 2019 07:01  -  28 Jan 2019 16:51  --------------------------------------------------------  IN: 50 mL / OUT: 300 mL / NET: -250 mL        PHYSICAL EXAM:  GENERAL: NAD, well-developed  HEAD:  Atraumatic, Normocephalic  NECK: Supple, No JVD  CHEST/LUNG: Clear to auscultation bilaterally; No wheeze  HEART: Regular rate and rhythm; No murmurs, rubs, or gallops  ABDOMEN: Soft, Nontender, Nondistended; Bowel sounds present  EXTREMITIES:  2+ Peripheral Pulses, No clubbing, cyanosis, or edema      LABS:                        8.4    8.60  )-----------( 220      ( 28 Jan 2019 06:44 )             24.8     01-28    144  |  108  |  13  ----------------------------<  101<H>  3.6   |  27  |  0.67    Ca    9.0      28 Jan 2019 06:44                    RADIOLOGY & ADDITIONAL TESTS:    Imaging Personally Reviewed:    Consultant(s) Notes Reviewed:  GI     Care Discussed with Consultants/Other Providers: GI - Dr Valdez

## 2019-01-28 NOTE — PROGRESS NOTE ADULT - SUBJECTIVE AND OBJECTIVE BOX
Patient seen and examined;  Chart reviewed and events noted;   receiving IV iron and IM B12; planned for EGD today      MEDICATIONS  (STANDING):  cyanocobalamin Injectable 1000 MICROGram(s) IntraMuscular daily  fluticasone propionate 50 MICROgram(s)/spray Nasal Spray 1 Spray(s) Both Nostrils two times a day  fluvoxaMINE 50 milliGRAM(s) Oral two times a day  iron sucrose Injectable 100 milliGRAM(s) IV Push every 24 hours  lactated ringers. 1000 milliLiter(s) (50 mL/Hr) IV Continuous <Continuous>  loratadine 10 milliGRAM(s) Oral daily  pantoprazole    Tablet 40 milliGRAM(s) Oral two times a day  risperiDONE   Tablet 2 milliGRAM(s) Oral two times a day  simvastatin 20 milliGRAM(s) Oral at bedtime  sucralfate 1 Gram(s) Oral two times a day  tamsulosin 0.4 milliGRAM(s) Oral at bedtime      Vital Signs Last 24 Hrs  T(C): 36.6 (28 Jan 2019 08:00), Max: 36.8 (27 Jan 2019 21:01)  T(F): 97.9 (28 Jan 2019 08:00), Max: 98.2 (27 Jan 2019 21:01)  HR: 69 (28 Jan 2019 12:00) (68 - 103)  BP: 135/67 (28 Jan 2019 12:00) (96/64 - 144/69)  BP(mean): 87 (28 Jan 2019 12:00) (74 - 95)  RR: 15 (28 Jan 2019 12:00) (14 - 23)  SpO2: 98% (28 Jan 2019 12:00) (94% - 100%)    PHYSICAL EXAM  General: adult male with MRDD in NAD; non-verbal  HEENT: clear oropharynx, anicteric sclera, pink conjunctivae  Neck: supple  CV: normal S1S2 with no murmur rubs or gallops  Lungs: clear to auscultation, no wheezes, no rhales  Abdomen: soft non-tender non-distended, no hepato/splenomegaly  Ext: no clubbing cyanosis or edema  Skin: no rashes and no petichiae  Neuro: alert and cooperative    LABS:                        8.4    8.60  )-----------( 220      ( 28 Jan 2019 06:44 )             24.8   Hemoglobin: 8.4 g/dL (01-28 @ 06:44)  Hemoglobin: 9.0 g/dL (01-27 @ 08:36)  Hemoglobin: 9.3 g/dL (01-26 @ 11:56)  Hemoglobin: 8.2 g/dL (01-26 @ 05:41)  Hemoglobin: 6.8 g/dL (01-25 @ 18:22)    01-28    144  |  108  |  13  ----------------------------<  101<H>  3.6   |  27  |  0.67    Ca    9.0      28 Jan 2019 06:44    Iron with Total Binding Capacity (01.26.19 @ 20:21)    Iron - Total Binding Capacity.: 341 ug/dL    % Saturation, Iron: 20 %    Iron Total, Serum: 67 ug/dL    Unsaturated Iron Binding Capacity: 274 ug/dL  Ferritin, Serum: 21 ng/mL (01.26.19 @ 20:21)    Occult Blood, Feces: Positive (01.25.19 @ 11:20)    Vitamin B12, Serum: 337: Note: Reference Range Change on 12/18/2017. pg/mL (01.26.19 @ 20:21)  Folate, Serum: 10.8 ng/mL (01.26.19 @ 20:21)

## 2019-01-28 NOTE — PROGRESS NOTE ADULT - ASSESSMENT
64 yo male who presents to the ED with a cc of fall.  PMHx of aggressiveness, agitation, autism, bipolar affective disorder, BPH, GERD, hematuria, HLD, HTN, developmental delay, schizophrenia, paranoid type.  Pt is non verbal and is unable to provide history.  Brought in for weakness. Found to have acute anemia secondary to GI bleed and +dvt.       1. Gastrointestinal hemorrhage - EGD done today, prelim results gastric and duodenal ulcer per discussion with Dr Aviles  -per GI no objection to start a/c  -hgb remains stable s/p 3 units prbcs on 1/25  -c/w PPI BID      2. Anemia due to acute blood loss for GI Bleed. HD stable  -s/p EGD today as above  -Hgb 8.4 from 9.0 yesterday,  -c/w IV iron per heme  -monitor cbc, transfuse as needed    3: +left femoral/popliteal DVT.   -US doppler +ve for DVT in left femoral, popliteal vein same as prior US  -discussed with GI, no objection to start a/c  -per heme first start lovenox ppx dose, if no bleeding and stable can consider restarting xarelto    4. Mental retardation.   supportive care.     5 Bipolar affective disorder: c/w chronic meds    6. Schizophrenia, paranoid type c/w risperidone.     7.  hyperlipidemia. c/w simvastatin.    DVT ppx: restart lovenox for now

## 2019-01-28 NOTE — PROGRESS NOTE ADULT - SUBJECTIVE AND OBJECTIVE BOX
Date/Time Patient Seen:  		  Referring MD:   Data Reviewed	       Patient is a 63y old  Male who presents with a chief complaint of Anemia, GIB, DVT (27 Jan 2019 10:16)      Subjective/HPI     PAST MEDICAL & SURGICAL HISTORY:  Fall (on) (from) other stairs and steps, initial encounter  Mental retardation  Aggressiveness  Agitation  Nonverbal  Autism  Bipolar affective disorder  Schizophrenia, paranoid type  GERD (gastroesophageal reflux disease)  Hypertension  Hyperlipidemia  Hematuria  BPH (benign prostatic hypertrophy) with urinary obstruction  Fracture of left malleolus: repaired in 7/2018  No significant past surgical history        Medication list         MEDICATIONS  (STANDING):  cyanocobalamin Injectable 1000 MICROGram(s) IntraMuscular daily  fluticasone propionate 50 MICROgram(s)/spray Nasal Spray 1 Spray(s) Both Nostrils two times a day  fluvoxaMINE 50 milliGRAM(s) Oral two times a day  iron sucrose Injectable 100 milliGRAM(s) IV Push every 24 hours  loratadine 10 milliGRAM(s) Oral daily  pantoprazole    Tablet 40 milliGRAM(s) Oral two times a day  risperiDONE   Tablet 2 milliGRAM(s) Oral two times a day  simvastatin 20 milliGRAM(s) Oral at bedtime  sucralfate 1 Gram(s) Oral two times a day  tamsulosin 0.4 milliGRAM(s) Oral at bedtime    MEDICATIONS  (PRN):         Vitals log        ICU Vital Signs Last 24 Hrs  T(C): 36.8 (28 Jan 2019 04:06), Max: 36.8 (27 Jan 2019 21:01)  T(F): 98.2 (28 Jan 2019 04:06), Max: 98.2 (27 Jan 2019 21:01)  HR: 103 (28 Jan 2019 04:00) (68 - 103)  BP: 137/77 (28 Jan 2019 04:00) (96/64 - 144/69)  BP(mean): 94 (28 Jan 2019 04:00) (74 - 95)  ABP: --  ABP(mean): --  RR: 21 (28 Jan 2019 04:00) (15 - 23)  SpO2: 97% (28 Jan 2019 04:00) (95% - 100%)           Input and Output:  I&O's Detail    26 Jan 2019 07:01  -  27 Jan 2019 07:00  --------------------------------------------------------  IN:    Oral Fluid: 700 mL    pantoprazole Infusion: 210 mL    sodium chloride 0.9%: 1575 mL  Total IN: 2485 mL    OUT:    Voided: 850 mL  Total OUT: 850 mL    Total NET: 1635 mL      27 Jan 2019 07:01  -  28 Jan 2019 05:51  --------------------------------------------------------  IN:    Oral Fluid: 1070 mL    pantoprazole Infusion: 10 mL    sodium chloride 0.9%: 75 mL  Total IN: 1155 mL    OUT:    Voided: 300 mL  Total OUT: 300 mL    Total NET: 855 mL          Lab Data                        9.0    9.37  )-----------( 202      ( 27 Jan 2019 08:36 )             27.5                   Review of Systems	      Objective     Physical Examination    in bed  seen and examined  heart s1s2  lung dec BS  abd soft  non verbal    Pertinent Lab findings & Imaging      Tegan:  NO   Adequate UO     I&O's Detail    26 Jan 2019 07:01  -  27 Jan 2019 07:00  --------------------------------------------------------  IN:    Oral Fluid: 700 mL    pantoprazole Infusion: 210 mL    sodium chloride 0.9%: 1575 mL  Total IN: 2485 mL    OUT:    Voided: 850 mL  Total OUT: 850 mL    Total NET: 1635 mL      27 Jan 2019 07:01  -  28 Jan 2019 05:51  --------------------------------------------------------  IN:    Oral Fluid: 1070 mL    pantoprazole Infusion: 10 mL    sodium chloride 0.9%: 75 mL  Total IN: 1155 mL    OUT:    Voided: 300 mL  Total OUT: 300 mL    Total NET: 855 mL               Discussed with:     Cultures:	        Radiology

## 2019-01-29 LAB
ANION GAP SERPL CALC-SCNC: 7 MMOL/L — SIGNIFICANT CHANGE UP (ref 5–17)
BUN SERPL-MCNC: 12 MG/DL — SIGNIFICANT CHANGE UP (ref 7–23)
CALCIUM SERPL-MCNC: 9 MG/DL — SIGNIFICANT CHANGE UP (ref 8.4–10.5)
CHLORIDE SERPL-SCNC: 108 MMOL/L — SIGNIFICANT CHANGE UP (ref 96–108)
CO2 SERPL-SCNC: 28 MMOL/L — SIGNIFICANT CHANGE UP (ref 22–31)
CREAT SERPL-MCNC: 0.71 MG/DL — SIGNIFICANT CHANGE UP (ref 0.5–1.3)
GLUCOSE SERPL-MCNC: 99 MG/DL — SIGNIFICANT CHANGE UP (ref 70–99)
HCT VFR BLD CALC: 25.8 % — LOW (ref 39–50)
HGB BLD-MCNC: 8.6 G/DL — LOW (ref 13–17)
MAGNESIUM SERPL-MCNC: 1.9 MG/DL — SIGNIFICANT CHANGE UP (ref 1.6–2.6)
MCHC RBC-ENTMCNC: 28.6 PG — SIGNIFICANT CHANGE UP (ref 27–34)
MCHC RBC-ENTMCNC: 33.3 GM/DL — SIGNIFICANT CHANGE UP (ref 32–36)
MCV RBC AUTO: 85.7 FL — SIGNIFICANT CHANGE UP (ref 80–100)
NRBC # BLD: 0 /100 WBCS — SIGNIFICANT CHANGE UP (ref 0–0)
PHOSPHATE SERPL-MCNC: 3.8 MG/DL — SIGNIFICANT CHANGE UP (ref 2.5–4.5)
PLATELET # BLD AUTO: 240 K/UL — SIGNIFICANT CHANGE UP (ref 150–400)
POTASSIUM SERPL-MCNC: 3.5 MMOL/L — SIGNIFICANT CHANGE UP (ref 3.5–5.3)
POTASSIUM SERPL-SCNC: 3.5 MMOL/L — SIGNIFICANT CHANGE UP (ref 3.5–5.3)
RBC # BLD: 3.01 M/UL — LOW (ref 4.2–5.8)
RBC # FLD: 14.4 % — SIGNIFICANT CHANGE UP (ref 10.3–14.5)
SODIUM SERPL-SCNC: 143 MMOL/L — SIGNIFICANT CHANGE UP (ref 135–145)
WBC # BLD: 8.44 K/UL — SIGNIFICANT CHANGE UP (ref 3.8–10.5)
WBC # FLD AUTO: 8.44 K/UL — SIGNIFICANT CHANGE UP (ref 3.8–10.5)

## 2019-01-29 PROCEDURE — 99233 SBSQ HOSP IP/OBS HIGH 50: CPT

## 2019-01-29 RX ORDER — RIVAROXABAN 15 MG-20MG
20 KIT ORAL EVERY 24 HOURS
Qty: 0 | Refills: 0 | Status: DISCONTINUED | OUTPATIENT
Start: 2019-01-29 | End: 2019-01-31

## 2019-01-29 RX ADMIN — FLUVOXAMINE MALEATE 50 MILLIGRAM(S): 25 TABLET ORAL at 06:27

## 2019-01-29 RX ADMIN — RISPERIDONE 2 MILLIGRAM(S): 4 TABLET ORAL at 17:18

## 2019-01-29 RX ADMIN — ENOXAPARIN SODIUM 40 MILLIGRAM(S): 100 INJECTION SUBCUTANEOUS at 11:45

## 2019-01-29 RX ADMIN — RISPERIDONE 2 MILLIGRAM(S): 4 TABLET ORAL at 06:27

## 2019-01-29 RX ADMIN — TAMSULOSIN HYDROCHLORIDE 0.4 MILLIGRAM(S): 0.4 CAPSULE ORAL at 21:22

## 2019-01-29 RX ADMIN — FLUVOXAMINE MALEATE 50 MILLIGRAM(S): 25 TABLET ORAL at 17:18

## 2019-01-29 RX ADMIN — SIMVASTATIN 20 MILLIGRAM(S): 20 TABLET, FILM COATED ORAL at 21:22

## 2019-01-29 RX ADMIN — Medication 1 SPRAY(S): at 17:18

## 2019-01-29 RX ADMIN — IRON SUCROSE 100 MILLIGRAM(S): 20 INJECTION, SOLUTION INTRAVENOUS at 14:33

## 2019-01-29 RX ADMIN — Medication 1 GRAM(S): at 06:27

## 2019-01-29 RX ADMIN — Medication 1 SPRAY(S): at 06:26

## 2019-01-29 RX ADMIN — PANTOPRAZOLE SODIUM 40 MILLIGRAM(S): 20 TABLET, DELAYED RELEASE ORAL at 17:18

## 2019-01-29 RX ADMIN — LORATADINE 10 MILLIGRAM(S): 10 TABLET ORAL at 11:44

## 2019-01-29 RX ADMIN — RIVAROXABAN 20 MILLIGRAM(S): KIT at 17:18

## 2019-01-29 RX ADMIN — PREGABALIN 1000 MICROGRAM(S): 225 CAPSULE ORAL at 11:44

## 2019-01-29 RX ADMIN — PANTOPRAZOLE SODIUM 40 MILLIGRAM(S): 20 TABLET, DELAYED RELEASE ORAL at 06:27

## 2019-01-29 RX ADMIN — Medication 1 GRAM(S): at 17:18

## 2019-01-29 NOTE — PROGRESS NOTE ADULT - SUBJECTIVE AND OBJECTIVE BOX
All interim records and events noted.    alert up in bed, comfortable  counselor present      MEDICATIONS  (STANDING):  cyanocobalamin Injectable 1000 MICROGram(s) IntraMuscular daily  enoxaparin Injectable 40 milliGRAM(s) SubCutaneous daily  fluticasone propionate 50 MICROgram(s)/spray Nasal Spray 1 Spray(s) Both Nostrils two times a day  fluvoxaMINE 50 milliGRAM(s) Oral two times a day  iron sucrose Injectable 100 milliGRAM(s) IV Push every 24 hours  loratadine 10 milliGRAM(s) Oral daily  pantoprazole    Tablet 40 milliGRAM(s) Oral two times a day  risperiDONE   Tablet 2 milliGRAM(s) Oral two times a day  simvastatin 20 milliGRAM(s) Oral at bedtime  sucralfate 1 Gram(s) Oral two times a day  tamsulosin 0.4 milliGRAM(s) Oral at bedtime    MEDICATIONS  (PRN):      Vital Signs Last 24 Hrs  T(C): 36.6 (29 Jan 2019 08:19), Max: 37 (28 Jan 2019 20:09)  T(F): 97.9 (29 Jan 2019 08:19), Max: 98.6 (28 Jan 2019 20:09)  HR: 98 (29 Jan 2019 10:02) (62 - 98)  BP: 113/70 (29 Jan 2019 10:02) (97/55 - 135/67)  BP(mean): 84 (29 Jan 2019 10:02) (67 - 90)  RR: 22 (29 Jan 2019 10:02) (15 - 22)  SpO2: 97% (29 Jan 2019 10:02) (94% - 100%)    PHYSICAL EXAM  General: well developed  well nourished, in no acute distress  Head: atraumatic, normocephalic  ENT: sclera anicteric, buccal mucosa moist  Neck: supple, trachea midline  CV: S1 S2, regular rate and rhythm  Lungs: clear to auscultation, no wheezes/rhonchi  Abdomen: soft, nontender, bowel sounds present, no palpable masses  Extrem: no clubbing/cyanosis/edema  Skin: no significant increased ecchymosis/petechiae  Neuro: alert and awake, nonverbal      LABS:             8.6    8.44  )-----------( 240      ( 01-29 @ 06:35 )             25.8                8.4    8.60  )-----------( 220      ( 01-28 @ 06:44 )             24.8                9.0    9.37  )-----------( 202      ( 01-27 @ 08:36 )             27.5       01-29    143  |  108  |  12  ----------------------------<  99  3.5   |  28  |  0.71    Ca    9.0      29 Jan 2019 06:35  Phos  3.8     01-29  Mg     1.9     01-29 01-26 @ 05:41  PT14.5 INR1.32  PTT--  01-25 @ 10:37  PT19.6 INR1.77  PTT33.5      RADIOLOGY & ADDITIONAL STUDIES:    IMPRESSION/RECOMMENDATIONS:

## 2019-01-29 NOTE — PROGRESS NOTE ADULT - ASSESSMENT
[ASSESSMENT and  PLAN]  62yo hx MR, non-verbal, HTN, HLD, BPH, osteoporosis.   Dx 11/2018 with DVT with left leg involving mid-calf, distal femoral, popliteal and tibial veins; the swelling/duration of unknown. Possible provoking hx with surgery in 7/2018 for left medial malleolus fracture s/p fall.  Recommend 3 months of anticoagulation, with Xarelto.     Now admitted with FOBT+ and new anemia, Hgb 6.  s/p PRBC tx, Hgb peak 9.3==>9.0 and has remained stable; Iron studies c/w iron deficiency with low normal B12 at 337; started on IV iron (1/27/19-1/29/19)and parenteral B12      RECOMMEND:   CBC stable  Completing 3 days of IV iron  Endoscopy w prelim gastric and duodenal ulcers  Hold Xarelto for now in setting of acute GI blood loss; When clinically stable start Lovenox prophylaxis dose. If no bleeding then full dose Lovenox anticoagulation to complete the planned 3months course for DVT, unless contraindication, eg large ulcer or  re-bleed.     Will sign off for now, please call if any questions

## 2019-01-29 NOTE — PROGRESS NOTE ADULT - SUBJECTIVE AND OBJECTIVE BOX
INTERVAL HPI/OVERNIGHT EVENTS:  No new overnight event.  No N/V/D.  Tolerating diet.  no melena no brbp  Allergies    No Known Allergies    Intolerances    General:  No wt loss, fevers, chills, night sweats, fatigue,   Eyes:  Good vision, no reported pain  ENT:  No sore throat, pain, runny nose, dysphagia  CV:  No pain, palpitations, hypo/hypertension  Resp:  No dyspnea, cough, tachypnea, wheezing  GI:  No pain, No nausea, No vomiting, No diarrhea, No constipation, No weight loss, No fever, No pruritis, No rectal bleeding, No tarry stools, No dysphagia,  :  No pain, bleeding, incontinence, nocturia  Muscle:  No pain, weakness  Neuro:  No weakness, tingling, memory problems  Psych:  No fatigue, insomnia, mood problems, depression  Endocrine:  No polyuria, polydipsia, cold/heat intolerance  Heme:  No petechiae, ecchymosis, easy bruisability  Skin:  No rash, tattoos, scars, edema    PHYSICAL EXAM:   Vital Signs:  Vital Signs Last 24 Hrs  T(C): 36.6 (2019 08:19), Max: 37 (2019 20:09)  T(F): 97.9 (2019 08:19), Max: 98.6 (2019 20:09)  HR: 98 (2019 10:02) (62 - 98)  BP: 113/70 (2019 10:02) (97/55 - 129/66)  BP(mean): 84 (2019 10:02) (67 - 90)  RR: 22 (2019 10:02) (15 - 22)  SpO2: 97% (2019 10:02) (94% - 100%)  Daily     Daily Weight in k.9 (2019 06:50)I&O's Summary    2019 07:01  -  2019 07:00  --------------------------------------------------------  IN: 650 mL / OUT: 1750 mL / NET: -1100 mL        GENERAL:  Appears stated age, well-groomed, well-nourished, no distress  HEENT:  NC/AT,  conjunctivae clear and pink, no thyromegaly, nodules, adenopathy, no JVD, sclera -anicteric  CHEST:  Full & symmetric excursion, no increased effort, breath sounds clear  HEART:  Regular rhythm, S1, S2, no murmur/rub/S3/S4, no abdominal bruit, no edema  ABDOMEN:  Soft, non-tender, non-distended, normoactive bowel sounds,  no masses ,no hepato-splenomegaly, no signs of chronic liver disease  EXTEREMITIES:  no cyanosis,clubbing or edema  SKIN:  No rash/erythema/ecchymoses/petechiae/wounds/abscess/warm/dry  NEURO:  Alert, oriented, no asterixis, no tremor, no encephalopathy      LABS:                        8.6    8.44  )-----------( 240      ( 2019 06:35 )             25.8         143  |  108  |  12  ----------------------------<  99  3.5   |  28  |  0.71    Ca    9.0      2019 06:35  Phos  3.8       Mg     1.9               amylase   lipaseLipase, Serum: 93 U/L ( @ 10:37)    RADIOLOGY & ADDITIONAL TESTS:

## 2019-01-29 NOTE — PROGRESS NOTE ADULT - PROBLEM SELECTOR PLAN 1
PUD  EGD done  serial Hgb  monitor HD and VS  on PPI BID  GI following  on Dysphagia Diet PO  serial labs, serial PE,  assist with ADL  pt is non verbal, MRDD and Autism  supportive medical regimen and care and assist with ADL  will follow  am LABS pending

## 2019-01-29 NOTE — PROGRESS NOTE ADULT - SUBJECTIVE AND OBJECTIVE BOX
Patient is a 63y old  Male who presents with a chief complaint of GIB (28 Jan 2019 22:33)    HPI:  Pt is a 62 yo male who presents to the ED with a cc of fall.  PMHx of aggressiveness, agitation, autism, bipolar affective disorder, BPH, GERD, hematuria, HLD, HTN, developmental delay, schizophrenia, paranoid type.  Pt is non verbal and is unable to provide history.  Per group home reports pt was being helped in the restroom when he suddenly became weak in the legs and dropped to the ground.  The staff reports that they lowered him gently to the floor on his buttocks.  Pt did not strike his head.  He then appeared to be shaking and his lips almost looked to be blue.  Staff also reports that pt appears to be paler then normal.    Of noted pt was admitted to the hospital 11/18 and was found to have a left lower ext DVT.  He was placed on Xarelto at that time.  It is unclear if this medication was stopped or if he is still taking this. (25 Jan 2019 15:09)    Today:  Pt is sp egd yesterday which showed gastric and duodenal ulcers. Pt is tolerating po diet. No overnight events at per staff.     REVIEW OF SYSTEMS  Pt can not make his needs known due to autism.     PAST MEDICAL & SURGICAL HISTORY:  Mental retardation  Aggressiveness  Agitation  Nonverbal  Autism  Bipolar affective disorder  Schizophrenia, paranoid type  GERD (gastroesophageal reflux disease)  Hypertension  Hyperlipidemia  Hematuria  BPH (benign prostatic hypertrophy) with urinary obstruction  Fracture of left malleolus: repaired in 7/2018    MEDICATIONS  (STANDING):  cyanocobalamin Injectable 1000 MICROGram(s) IntraMuscular daily  enoxaparin Injectable 40 milliGRAM(s) SubCutaneous daily  fluticasone propionate 50 MICROgram(s)/spray Nasal Spray 1 Spray(s) Both Nostrils two times a day  fluvoxaMINE 50 milliGRAM(s) Oral two times a day  iron sucrose Injectable 100 milliGRAM(s) IV Push every 24 hours  loratadine 10 milliGRAM(s) Oral daily  pantoprazole    Tablet 40 milliGRAM(s) Oral two times a day  risperiDONE   Tablet 2 milliGRAM(s) Oral two times a day  simvastatin 20 milliGRAM(s) Oral at bedtime  sucralfate 1 Gram(s) Oral two times a day  tamsulosin 0.4 milliGRAM(s) Oral at bedtime    EXAM:  Vital Signs Last 24 Hrs  T(C): 36.6 (29 Jan 2019 08:19), Max: 37.2 (28 Jan 2019 09:08)  T(F): 97.9 (29 Jan 2019 08:19), Max: 99 (28 Jan 2019 09:08)  HR: 70 (29 Jan 2019 08:00) (62 - 91)  BP: 122/67 (29 Jan 2019 08:00) (97/55 - 155/69)  BP(mean): 83 (29 Jan 2019 08:00) (67 - 90)  RR: 17 (29 Jan 2019 08:00) (14 - 21)  SpO2: 97% (29 Jan 2019 08:00) (94% - 100%)    01-28 @ 07:01  -  01-29 @ 07:00  --------------------------------------------------------  IN: 650 mL / OUT: 1750 mL / NET: -1100 mL    PHYSICAL EXAM:  Constitutional: awake in bed, nad. aide at bedside.   ENMT: patent nares, moist mucus membranes  Neck: supple  Respiratory: bilaterally clear to auscultation, no wheezing, no rhonchi, no crackles, no decreased air entry  Cardiovascular: s1s2, rrr, no murmurs.   Gastrointestinal: soft, non tender, +bowel sounds, no rebound, no guarding.   Extremities: no edema.   Neurological: alert   Skin: intact no rash, warm to touch.   Musculoskeletal: moves all 4 extremities    LABS:                   8.6    8.44  )-----------( 240      ( 29 Jan 2019 06:35 )             25.8     01-29  143  |  108  |  12  ----------------------------<  99  3.5   |  28  |  0.71    Ca    9.0      29 Jan 2019 06:35  Phos  3.8     01-29  Mg     1.9     01-29    Chart reviewed.   Labs reviewed.  Imaging reviewed.   Plan discussed with consultants.

## 2019-01-29 NOTE — PROGRESS NOTE ADULT - SUBJECTIVE AND OBJECTIVE BOX
63y year old Male admitted for Patient is a 63y old  Male who presents with a chief complaint of gi bleed (29 Jan 2019 12:08)          62 yo M with history of mental retardation, autism, bipolar, schizophrenia, HTN, HLD, admitted with anemia from GIB requiring transfusion, had EGD yesterday which revealed gastric and doudenal ulcers.  Patient communication limited but currently denies CP, abdominal pain, difficulty breathing    I      PMH:  Gastrointestinal hemorrhage  No pertinent family history in first degree relatives  Family history unknown  Handoff  MEWS Score  Fall (on) (from) other stairs and steps, initial encounter  Mental retardation  Aggressiveness  Agitation  Nonverbal  Autism  Bipolar affective disorder  Schizophrenia, paranoid type  GERD (gastroesophageal reflux disease)  Hypertension  Hyperlipidemia  Hematuria  BPH (benign prostatic hypertrophy) with urinary obstruction  GI bleed  GI bleed  DVT (deep venous thrombosis)  Mood disorder  Panniculitis  Leukocytosis  Nonverbal  Hiatal hernia  Lactate blood increase  Autism  Other hyperlipidemia  Schizophrenia, paranoid type  Bipolar affective disorder  Mental retardation  Anemia due to acute blood loss  Gastrointestinal hemorrhage, unspecified gastrointestinal hemorrhage type  Fracture of left malleolus  No significant past surgical history  FELL  71  Autism  GERD (gastroesophageal reflux disease)  Bipolar affective disorder  Anemia  Leukocytosis        MEDICATIONS  (STANDING):  cyanocobalamin Injectable 1000 MICROGram(s) IntraMuscular daily  fluticasone propionate 50 MICROgram(s)/spray Nasal Spray 1 Spray(s) Both Nostrils two times a day  fluvoxaMINE 50 milliGRAM(s) Oral two times a day  loratadine 10 milliGRAM(s) Oral daily  pantoprazole    Tablet 40 milliGRAM(s) Oral two times a day  risperiDONE   Tablet 2 milliGRAM(s) Oral two times a day  rivaroxaban 20 milliGRAM(s) Oral every 24 hours  simvastatin 20 milliGRAM(s) Oral at bedtime  sucralfate 1 Gram(s) Oral two times a day  tamsulosin 0.4 milliGRAM(s) Oral at bedtime    MEDICATIONS  (PRN):      I&O's Summary    28 Jan 2019 07:01  -  29 Jan 2019 07:00  --------------------------------------------------------  IN: 650 mL / OUT: 1750 mL / NET: -1100 mL    29 Jan 2019 07:01  -  29 Jan 2019 20:24  --------------------------------------------------------  IN: 300 mL / OUT: 550 mL / NET: -250 mL      ICU Vital Signs Last 24 Hrs  T(C): 36.6 (29 Jan 2019 20:12), Max: 36.8 (29 Jan 2019 00:02)  T(F): 97.8 (29 Jan 2019 20:12), Max: 98.2 (29 Jan 2019 00:02)  HR: 79 (29 Jan 2019 20:01) (62 - 102)  BP: 101/61 (29 Jan 2019 20:01) (90/61 - 122/67)  BP(mean): 73 (29 Jan 2019 20:01) (67 - 84)  ABP: --  ABP(mean): --  RR: 23 (29 Jan 2019 20:01) (15 - 23)  SpO2: 95% (29 Jan 2019 20:01) (94% - 99%)      PHYSICAL EXAM:  General:  awake, lying in bed  CV: s1s2 RRR no murmurs  pulm: Clear bilaterally   GI: soft NTND   Skin: pale  Extremities: no peripheral edema    01-29    143  |  108  |  12  ----------------------------<  99  3.5   |  28  |  0.71    Ca    9.0      29 Jan 2019 06:35  Phos  3.8     01-29  Mg     1.9     01-29                            8.6    8.44  )-----------( 240      ( 29 Jan 2019 06:35 )             25.8                    62 yo M with history of mental retardation, autism, bipolar, schizophrenia, HTN, HLD, admitted with anemia from GIB requiring transfusion, had EGD yesterdfay which revealed gastric and doudenal ulcers. Hgb stable, no further melena, started on diet today.  - Continue risperdone and fluvoxamine  - Continue simvastatin for HLD  - Continue protonix BID and sucralfate.    - Started on xeralto today for DVT, plan for 3 months if he tolerates   Continue iron supplementation x 3 days  continue to monitor CBC, transfuse for Hgb <7 or unstable.  - Continue tamsulosin for BPH  - continue fluticasone nasal spray and loratadine  - continue to monitor if SPCU.

## 2019-01-29 NOTE — PROGRESS NOTE ADULT - SUBJECTIVE AND OBJECTIVE BOX
Date/Time Patient Seen:  		  Referring MD:   Data Reviewed	       Patient is a 63y old  Male who presents with a chief complaint of GIB (28 Jan 2019 22:33)      Subjective/HPI     PAST MEDICAL & SURGICAL HISTORY:  Fall (on) (from) other stairs and steps, initial encounter  Mental retardation  Aggressiveness  Agitation  Nonverbal  Autism  Bipolar affective disorder  Schizophrenia, paranoid type  GERD (gastroesophageal reflux disease)  Hypertension  Hyperlipidemia  Hematuria  BPH (benign prostatic hypertrophy) with urinary obstruction  Fracture of left malleolus: repaired in 7/2018  No significant past surgical history        Medication list         MEDICATIONS  (STANDING):  cyanocobalamin Injectable 1000 MICROGram(s) IntraMuscular daily  enoxaparin Injectable 40 milliGRAM(s) SubCutaneous daily  fluticasone propionate 50 MICROgram(s)/spray Nasal Spray 1 Spray(s) Both Nostrils two times a day  fluvoxaMINE 50 milliGRAM(s) Oral two times a day  iron sucrose Injectable 100 milliGRAM(s) IV Push every 24 hours  loratadine 10 milliGRAM(s) Oral daily  pantoprazole    Tablet 40 milliGRAM(s) Oral two times a day  risperiDONE   Tablet 2 milliGRAM(s) Oral two times a day  simvastatin 20 milliGRAM(s) Oral at bedtime  sucralfate 1 Gram(s) Oral two times a day  tamsulosin 0.4 milliGRAM(s) Oral at bedtime    MEDICATIONS  (PRN):         Vitals log        ICU Vital Signs Last 24 Hrs  T(C): 36.6 (29 Jan 2019 04:20), Max: 37.2 (28 Jan 2019 09:08)  T(F): 97.9 (29 Jan 2019 04:20), Max: 99 (28 Jan 2019 09:08)  HR: 62 (29 Jan 2019 02:00) (62 - 91)  BP: 100/59 (29 Jan 2019 02:00) (100/59 - 155/69)  BP(mean): 72 (29 Jan 2019 02:00) (72 - 94)  ABP: --  ABP(mean): --  RR: 15 (29 Jan 2019 02:00) (14 - 21)  SpO2: 96% (29 Jan 2019 02:00) (94% - 100%)           Input and Output:  I&O's Detail    27 Jan 2019 07:01  -  28 Jan 2019 07:00  --------------------------------------------------------  IN:    Oral Fluid: 1070 mL    pantoprazole Infusion: 10 mL    sodium chloride 0.9%: 75 mL  Total IN: 1155 mL    OUT:    Voided: 300 mL  Total OUT: 300 mL    Total NET: 855 mL      28 Jan 2019 07:01  -  29 Jan 2019 06:01  --------------------------------------------------------  IN:    lactated ringers.: 400 mL    Oral Fluid: 100 mL  Total IN: 500 mL    OUT:    Voided: 600 mL  Total OUT: 600 mL    Total NET: -100 mL          Lab Data                        8.4    8.60  )-----------( 220      ( 28 Jan 2019 06:44 )             24.8     01-28    144  |  108  |  13  ----------------------------<  101<H>  3.6   |  27  |  0.67    Ca    9.0      28 Jan 2019 06:44              Review of Systems	      Objective     Physical Examination    heart s1s2  lung dec BS      Pertinent Lab findings & Imaging      Tegan:  NO   Adequate UO     I&O's Detail    27 Jan 2019 07:01  -  28 Jan 2019 07:00  --------------------------------------------------------  IN:    Oral Fluid: 1070 mL    pantoprazole Infusion: 10 mL    sodium chloride 0.9%: 75 mL  Total IN: 1155 mL    OUT:    Voided: 300 mL  Total OUT: 300 mL    Total NET: 855 mL      28 Jan 2019 07:01  -  29 Jan 2019 06:01  --------------------------------------------------------  IN:    lactated ringers.: 400 mL    Oral Fluid: 100 mL  Total IN: 500 mL    OUT:    Voided: 600 mL  Total OUT: 600 mL    Total NET: -100 mL               Discussed with:     Cultures:	        Radiology

## 2019-01-29 NOTE — PROGRESS NOTE ADULT - ASSESSMENT
64 yo male who presents to the ED with a cc of fall.  PMHx of aggressiveness, agitation, autism, bipolar affective disorder, BPH, GERD, hematuria, HLD, HTN, developmental delay, schizophrenia, paranoid type.  Pt is non verbal and is unable to provide history.  Brought in for weakness. Found to have acute anemia secondary to GI bleed and +dvt.     1. Gastrointestinal hemorrhage -   EGD: prelim results gastric and duodenal ulcer per discussion with Dr Aviles  -per GI no objection to start a/c  -hgb remains stable s/p 3 units prbcs on 1/25  -c/w PPI BID    2. Anemia due to acute blood loss for GI Bleed. HD stable  -s/p EGD today as above  -Hgb 8.6 from 8.4 yesterday,  -c/w IV iron per heme and B12  -monitor cbc, transfuse as needed    3: +left femoral/popliteal DVT.   -US doppler +ve for DVT in left femoral, popliteal vein same as prior US  -discussed with GI, no objection to start a/c  -per heme first start lovenox ppx dose, if no bleeding and stable can consider restarting xarelto    4. Mental retardation.   supportive care.     5 Bipolar affective disorder: c/w chronic meds    6. Schizophrenia, paranoid type c/w risperidone.     7.  hyperlipidemia. c/w simvastatin.    DVT ppx: restarted on prophylatic lovenox for now 64 yo male who presents to the ED with a cc of fall.  PMHx of aggressiveness, agitation, autism, bipolar affective disorder, BPH, GERD, hematuria, HLD, HTN, developmental delay, schizophrenia, paranoid type.  Pt is non verbal and is unable to provide history.  Brought in for weakness. Found to have acute anemia secondary to GI bleed and +dvt.     1. Gastrointestinal hemorrhage -   EGD: prelim results gastric and duodenal ulcer per discussion with Dr Aviles  -per GI no objection to start a/c. awaiting call back to when xarelto can be started.   -hgb remains stable s/p 3 units prbcs on 1/25  -c/w PPI BID    2. Anemia due to acute blood loss for GI Bleed. HD stable  -s/p EGD today as above  -Hgb 8.6 from 8.4 yesterday,  -c/w IV iron per heme and B12  -monitor cbc, transfuse as needed    3: +left femoral/popliteal DVT.   -US doppler +ve for DVT in left femoral, popliteal vein same as prior US  -discussed with GI, no objection to start a/c  -per heme first start lovenox ppx dose, if no bleeding and stable can consider restarting xarelto    4. Mental retardation.   supportive care.     5 Bipolar affective disorder: c/w chronic meds    6. Schizophrenia, paranoid type c/w risperidone.     7.  hyperlipidemia. c/w simvastatin.    DVT ppx: restarted on prophylatic lovenox for now 62 yo male who presents to the ED with a cc of fall.  PMHx of aggressiveness, agitation, autism, bipolar affective disorder, BPH, GERD, hematuria, HLD, HTN, developmental delay, schizophrenia, paranoid type.  Pt is non verbal and is unable to provide history.  Brought in for weakness. Found to have acute anemia secondary to GI bleed and +dvt.     1. Gastrointestinal hemorrhage -   EGD: prelim results gastric and duodenal ulcer per discussion with Dr Aviles  -per GI no objection to start a/c. discussed with Dr Aviles, to resume xarelto now.   -hgb remains stable s/p 3 units prbcs on 1/25  -c/w PPI BID    2. Anemia due to acute blood loss for GI Bleed. HD stable  -s/p EGD today as above  -Hgb 8.6 from 8.4 yesterday,  -c/w IV iron per heme and B12  -monitor cbc, transfuse as needed    3: +left femoral/popliteal DVT.   -US doppler +ve for DVT in left femoral, popliteal vein same as prior US  -discussed with GI, no objection to start a/c  -per heme first start lovenox ppx dose, if no bleeding and stable can consider restarting xarelto    4. Mental retardation.   supportive care.     5 Bipolar affective disorder: c/w chronic meds    6. Schizophrenia, paranoid type c/w risperidone.     7.  hyperlipidemia. c/w simvastatin.    DVT ppx: on xarelto. 62 yo male who presents to the ED with a cc of fall.  PMHx of aggressiveness, agitation, autism, bipolar affective disorder, BPH, GERD, hematuria, HLD, HTN, developmental delay, schizophrenia, paranoid type.  Pt is non verbal and is unable to provide history.  Brought in for weakness. Found to have acute anemia secondary to GI bleed and +dvt.     1. Gastrointestinal hemorrhage secondary to gastric and duodenal ulcers.   EGD: prelim results gastric and duodenal ulcer per discussion with Dr Aviles  -per GI no objection to start a/c. discussed with Dr Aviles, to resume xarelto now.   -hgb remains stable s/p 3 units prbcs on 1/25  -c/w PPI BID    2. Anemia due to acute blood loss for GI Bleed. HD stable  -s/p EGD today as above  -Hgb 8.6 from 8.4 yesterday,  -c/w IV iron per heme and B12  -monitor cbc, transfuse as needed    3: +left femoral/popliteal DVT.   -US doppler +ve for DVT in left femoral, popliteal vein same as prior US  -discussed with GI, no objection to start a/c  -per heme first start lovenox ppx dose, if no bleeding and stable can consider restarting xarelto    4. Mental retardation.   supportive care.     5 Bipolar affective disorder: c/w chronic meds    6. Schizophrenia, paranoid type c/w risperidone.     7.  hyperlipidemia. c/w simvastatin.    DVT ppx: on xarelto.

## 2019-01-29 NOTE — CHART NOTE - NSCHARTNOTEFT_GEN_A_CORE
Assessment: Pt is a 64yo male w/ PMH of autism, non-verbal, aggressive behavior, mental retardation, and DVT on xarelto, chronic reisdent of nursing facility. Due to nonverbal status, information was obtained from pt's advocate and EMR. Pt was admitted with cc of a fall. Per progress note 1/29, pt has gastric and duodenal ulcers, no n/v/d. Current diet order is dysphagia 2 mechanical soft--thin liquids, which advocate states he is tolerating well. Advocate describes appetite/intake in hospital and PTA as "very good", and states pt consumed 100% of breakfast tray. Advocate requests supervision during meals as pt consumes meals too quickly and she fears he will choke; needs someone to stop him between bites and get him to take a sip of water, which he enjoys drinking. Per EMR, pt has NKFA, skin is WDL except for abrasion on right forehead and linear fissure on coccyx. Trace edema 1+ noted in right and left ankles.      Factors impacting intake: [ ] none [ ] nausea  [ ] vomiting [ ] diarrhea [ ] constipation  [ ]chewing problems [ X] swallowing issues  [X ] other: Eats too fast, dysphagia 2 mech soft--thin liquid diet    Diet Presciption: Diet, Dysphagia 2 Mechanical Soft-Thin Liquids (01-28-19 @ 18:06)    Intake: Good (Advocate reports pt consumed 100% of breakfast tray)    Current Weight: Weight (kg): 80.8 (01-28 @ 15:19) --No updated body weight at this time  % Weight Change    Pertinent Medications: MEDICATIONS  (STANDING):  cyanocobalamin Injectable 1000 MICROGram(s) IntraMuscular daily  enoxaparin Injectable 40 milliGRAM(s) SubCutaneous daily  fluticasone propionate 50 MICROgram(s)/spray Nasal Spray 1 Spray(s) Both Nostrils two times a day  fluvoxaMINE 50 milliGRAM(s) Oral two times a day  iron sucrose Injectable 100 milliGRAM(s) IV Push every 24 hours  loratadine 10 milliGRAM(s) Oral daily  pantoprazole    Tablet 40 milliGRAM(s) Oral two times a day  risperiDONE   Tablet 2 milliGRAM(s) Oral two times a day  simvastatin 20 milliGRAM(s) Oral at bedtime  sucralfate 1 Gram(s) Oral two times a day  tamsulosin 0.4 milliGRAM(s) Oral at bedtime    MEDICATIONS  (PRN):    Pertinent Labs: 01-29 RBC 3.01<L> Hgb 8.6 <L> Hct 25.8 <L> Na143 mmol/L Glu 99 mg/dL K+ 3.5 mmol/L Cr  0.71 mg/dL BUN 12 mg/dL 01-29 Phos 3.8 mg/dL 01-25 Alb 3.1 g/dL<L> 01-26 Ferretin 21 <L>     CAPILLARY BLOOD GLUCOSE      Skin: WDL except for abrasion on right forehead and linear fissure on coccyx. Trace edema 1+ on right and left ankles.    Estimated Needs:   [ X] no change since previous assessment  [ ] recalculated:     Previous Nutrition Diagnosis:   [ ] Inadequate Energy Intake [ ]Inadequate Oral Intake [ ] Excessive Energy Intake   [ ] Underweight [ ] Increased Nutrient Needs [ ] Overweight/Obesity   [X] Altered GI Function [ ] Unintended Weight Loss [ ] Food & Nutrition Related Knowledge Deficit [ ] Malnutrition     Nutrition Diagnosis is [X] ongoing  [ ] resolved [ ] not applicable     New Nutrition Diagnosis: [X] not applicable       Interventions:   Recommend  [ ] Change Diet To:  [ ] Nutrition Supplement  [ ] Nutrition Support  [X] Other: Continue with diet order of [facility] Soft as tolerated with supervision.    Monitoring and Evaluation:   [X] PO intake [ x ] Tolerance to diet prescription [ x ] weights [ x ] labs[ x ] follow up per protocol  [ ] other: Assessment: Pt is a 62yo male w/ PMH of autism, non-verbal, aggressive behavior, mental retardation, and DVT on xarelto, chronic resident of nursing facility. Due to nonverbal status, information was obtained from pt's advocate and EMR. Pt was admitted with cc of a fall. Per progress note 1/29, pt has gastric and duodenal ulcers, no n/v/d. Current diet order is dysphagia 2 mechanical soft--thin liquids, which advocate states he is tolerating well. Advocate describes appetite/intake in hospital and PTA as "very good", and states pt consumed 100% of breakfast tray. Advocate requests supervision during meals as pt consumes meals too quickly and she fears he will choke; needs someone to stop him between bites and get him to take a sip of water, which he enjoys drinking. Per EMR, pt has NKFA, skin is WDL except for abrasion on right forehead and linear fissure on coccyx. Trace edema 1+ noted in right and left ankles.      Factors impacting intake: [ ] none [ ] nausea  [ ] vomiting [ ] diarrhea [ ] constipation  [ ]chewing problems [ X] swallowing issues  [X ] other: [facility] too [facility]    Diet Presciption: Diet, Dysphagia 2 Mechanical Soft-Thin Liquids (01-28-19 @ 18:06)    Intake: Very Good (Advocate reports pt consumed 100% of breakfast tray)    Current Weight: Weight (kg): 80.8 (01-28 @ 15:19) --No updated body weight at this time  % Weight Change    Pertinent Medications: MEDICATIONS  (STANDING):  cyanocobalamin Injectable 1000 MICROGram(s) IntraMuscular daily  enoxaparin Injectable 40 milliGRAM(s) SubCutaneous daily  fluticasone propionate 50 MICROgram(s)/spray Nasal Spray 1 Spray(s) Both Nostrils two times a day  fluvoxaMINE 50 milliGRAM(s) Oral two times a day  iron sucrose Injectable 100 milliGRAM(s) IV Push every 24 hours  loratadine 10 milliGRAM(s) Oral daily  pantoprazole    Tablet 40 milliGRAM(s) Oral two times a day  risperiDONE   Tablet 2 milliGRAM(s) Oral two times a day  simvastatin 20 milliGRAM(s) Oral at bedtime  sucralfate 1 Gram(s) Oral two times a day  tamsulosin 0.4 milliGRAM(s) Oral at bedtime    MEDICATIONS  (PRN):    Pertinent Labs: 01-29 RBC 3.01<L> Hgb 8.6 <L> Hct 25.8 <L> Na143 mmol/L Glu 99 mg/dL K+ 3.5 mmol/L Cr  0.71 mg/dL BUN 12 mg/dL 01-29 Phos 3.8 mg/dL 01-25 Alb 3.1 g/dL<L> 01-26 Ferretin 21 <L>     CAPILLARY BLOOD GLUCOSE      Skin: WDL except for abrasion on right forehead and linear fissure on coccyx. Trace edema 1+ on right and left ankles.    Estimated Needs:   [ X] no change since previous assessment  [ ] recalculated:     Previous Nutrition Diagnosis:   [ ] Inadequate Energy Intake [ ]Inadequate Oral Intake [ ] Excessive Energy Intake   [ ] Underweight [ ] Increased Nutrient Needs [ ] Overweight/Obesity   [X] Altered GI Function [ ] Unintended Weight Loss [ ] Food & Nutrition Related Knowledge Deficit [ ] Malnutrition     Nutrition Diagnosis is [X] ongoing  [ ] resolved [ ] not applicable     New Nutrition Diagnosis: [X] not applicable       Interventions:   Recommend  [ ] Change Diet To:  [ ] Nutrition Supplement  [ ] Nutrition Support  [X] Other: Continue with diet order of Dysphagia 2 Mech Soft--thin liquids as tolerated with supervision.    Monitoring and Evaluation:   [X] PO intake [ x ] Tolerance to diet prescription [ x ] weights [ x ] labs[ x ] follow up per protocol  [ ] other: Assessment: Pt is a 64yo male w/ PMH of autism, non-verbal, aggressive behavior, mental retardation, and DVT on xarelto. Due to nonverbal status, information was obtained from pt's advocate and EMR. Pt was admitted with cc of a fall. Per progress note 1/29, pt has gastric and duodenal ulcers, no n/v/d. Current diet order is dysphagia 2 mechanical soft--thin liquids, which advocate states he is tolerating well. Advocate describes appetite/intake in hospital and PTA as "very good", and states pt consumed 100% of breakfast tray. Advocate requests supervision during meals as pt consumes meals too quickly and she fears he will choke; needs someone to stop him between bites and get him to take a sip of water, which he enjoys drinking. Per EMR, pt has NKFA, skin is WDL except for abrasion on right forehead and linear fissure on coccyx. Trace edema 1+ noted in right and left ankles.      Factors impacting intake: [ ] none [ ] nausea  [ ] vomiting [ ] diarrhea [ ] constipation  [ ]chewing problems [ X] swallowing issues  [X ] other: Eats too [facility], [facility]    Diet Presciption: Diet, Dysphagia 2 Mechanical Soft-Thin Liquids (01-28-19 @ 18:06)    Intake: Very Good (Advocate reports pt consumed 100% of breakfast tray)    Current Weight: Weight (kg): 80.8 (01-28 @ 15:19) --No updated body weight at this time  % Weight Change    Pertinent Medications: MEDICATIONS  (STANDING):  cyanocobalamin Injectable 1000 MICROGram(s) IntraMuscular daily  enoxaparin Injectable 40 milliGRAM(s) SubCutaneous daily  fluticasone propionate 50 MICROgram(s)/spray Nasal Spray 1 Spray(s) Both Nostrils two times a day  fluvoxaMINE 50 milliGRAM(s) Oral two times a day  iron sucrose Injectable 100 milliGRAM(s) IV Push every 24 hours  loratadine 10 milliGRAM(s) Oral daily  pantoprazole    Tablet 40 milliGRAM(s) Oral two times a day  risperiDONE   Tablet 2 milliGRAM(s) Oral two times a day  simvastatin 20 milliGRAM(s) Oral at bedtime  sucralfate 1 Gram(s) Oral two times a day  tamsulosin 0.4 milliGRAM(s) Oral at bedtime    MEDICATIONS  (PRN):    Pertinent Labs: 01-29 RBC 3.01<L> Hgb 8.6 <L> Hct 25.8 <L> Na143 mmol/L Glu 99 mg/dL K+ 3.5 mmol/L Cr  0.71 mg/dL BUN 12 mg/dL 01-29 Phos 3.8 mg/dL 01-25 Alb 3.1 g/dL<L> 01-26 Ferretin 21 <L>     CAPILLARY BLOOD GLUCOSE      Skin: WDL except for abrasion on right forehead and linear fissure on coccyx. Trace edema 1+ on right and left ankles.    Estimated Needs:   [ X] no change since previous assessment  [ ] recalculated:     Previous Nutrition Diagnosis:   [ ] Inadequate Energy Intake [ ]Inadequate Oral Intake [ ] Excessive Energy Intake   [ ] Underweight [ ] Increased Nutrient Needs [ ] Overweight/Obesity   [X] Altered GI Function [ ] Unintended Weight Loss [ ] Food & Nutrition Related Knowledge Deficit [ ] Malnutrition     Nutrition Diagnosis is [X] ongoing  [ ] resolved [ ] not applicable     New Nutrition Diagnosis: [X] not applicable       Interventions:   Recommend  [ ] Change Diet To:  [ ] Nutrition Supplement  [ ] Nutrition Support  [X] Other: Continue with diet order of Dysphagia 2 Mech Soft--thin liquids as tolerated with supervision.    Monitoring and Evaluation:   [X] PO intake [ x ] Tolerance to diet prescription [ x ] weights [ x ] labs[ x ] follow up per protocol  [ ] other:

## 2019-01-30 LAB
ANION GAP SERPL CALC-SCNC: 6 MMOL/L — SIGNIFICANT CHANGE UP (ref 5–17)
APTT BLD: 50 SEC — HIGH (ref 28.5–37)
BUN SERPL-MCNC: 13 MG/DL — SIGNIFICANT CHANGE UP (ref 7–23)
CALCIUM SERPL-MCNC: 9.3 MG/DL — SIGNIFICANT CHANGE UP (ref 8.4–10.5)
CHLORIDE SERPL-SCNC: 107 MMOL/L — SIGNIFICANT CHANGE UP (ref 96–108)
CO2 SERPL-SCNC: 29 MMOL/L — SIGNIFICANT CHANGE UP (ref 22–31)
CREAT SERPL-MCNC: 0.82 MG/DL — SIGNIFICANT CHANGE UP (ref 0.5–1.3)
CULTURE RESULTS: SIGNIFICANT CHANGE UP
CULTURE RESULTS: SIGNIFICANT CHANGE UP
GLUCOSE SERPL-MCNC: 101 MG/DL — HIGH (ref 70–99)
HCT VFR BLD CALC: 26.5 % — LOW (ref 39–50)
HGB BLD-MCNC: 8.7 G/DL — LOW (ref 13–17)
INR BLD: 1.76 RATIO — HIGH (ref 0.88–1.16)
MCHC RBC-ENTMCNC: 28.8 PG — SIGNIFICANT CHANGE UP (ref 27–34)
MCHC RBC-ENTMCNC: 32.8 GM/DL — SIGNIFICANT CHANGE UP (ref 32–36)
MCV RBC AUTO: 87.7 FL — SIGNIFICANT CHANGE UP (ref 80–100)
NRBC # BLD: 0 /100 WBCS — SIGNIFICANT CHANGE UP (ref 0–0)
PLATELET # BLD AUTO: 238 K/UL — SIGNIFICANT CHANGE UP (ref 150–400)
POTASSIUM SERPL-MCNC: 3.9 MMOL/L — SIGNIFICANT CHANGE UP (ref 3.5–5.3)
POTASSIUM SERPL-SCNC: 3.9 MMOL/L — SIGNIFICANT CHANGE UP (ref 3.5–5.3)
PROTHROM AB SERPL-ACNC: 19.5 SEC — HIGH (ref 10–12.9)
RBC # BLD: 3.02 M/UL — LOW (ref 4.2–5.8)
RBC # FLD: 15.3 % — HIGH (ref 10.3–14.5)
SODIUM SERPL-SCNC: 142 MMOL/L — SIGNIFICANT CHANGE UP (ref 135–145)
SPECIMEN SOURCE: SIGNIFICANT CHANGE UP
SPECIMEN SOURCE: SIGNIFICANT CHANGE UP
WBC # BLD: 8.27 K/UL — SIGNIFICANT CHANGE UP (ref 3.8–10.5)
WBC # FLD AUTO: 8.27 K/UL — SIGNIFICANT CHANGE UP (ref 3.8–10.5)

## 2019-01-30 PROCEDURE — 99233 SBSQ HOSP IP/OBS HIGH 50: CPT

## 2019-01-30 RX ADMIN — TAMSULOSIN HYDROCHLORIDE 0.4 MILLIGRAM(S): 0.4 CAPSULE ORAL at 22:06

## 2019-01-30 RX ADMIN — LORATADINE 10 MILLIGRAM(S): 10 TABLET ORAL at 11:33

## 2019-01-30 RX ADMIN — RISPERIDONE 2 MILLIGRAM(S): 4 TABLET ORAL at 17:17

## 2019-01-30 RX ADMIN — RISPERIDONE 2 MILLIGRAM(S): 4 TABLET ORAL at 05:39

## 2019-01-30 RX ADMIN — Medication 1 SPRAY(S): at 05:40

## 2019-01-30 RX ADMIN — Medication 1 SPRAY(S): at 17:18

## 2019-01-30 RX ADMIN — FLUVOXAMINE MALEATE 50 MILLIGRAM(S): 25 TABLET ORAL at 05:39

## 2019-01-30 RX ADMIN — PREGABALIN 1000 MICROGRAM(S): 225 CAPSULE ORAL at 11:33

## 2019-01-30 RX ADMIN — Medication 1 GRAM(S): at 17:17

## 2019-01-30 RX ADMIN — RIVAROXABAN 20 MILLIGRAM(S): KIT at 17:17

## 2019-01-30 RX ADMIN — SIMVASTATIN 20 MILLIGRAM(S): 20 TABLET, FILM COATED ORAL at 22:06

## 2019-01-30 RX ADMIN — PANTOPRAZOLE SODIUM 40 MILLIGRAM(S): 20 TABLET, DELAYED RELEASE ORAL at 05:39

## 2019-01-30 RX ADMIN — Medication 1 GRAM(S): at 05:39

## 2019-01-30 RX ADMIN — FLUVOXAMINE MALEATE 50 MILLIGRAM(S): 25 TABLET ORAL at 17:17

## 2019-01-30 RX ADMIN — PANTOPRAZOLE SODIUM 40 MILLIGRAM(S): 20 TABLET, DELAYED RELEASE ORAL at 17:17

## 2019-01-30 NOTE — PROGRESS NOTE ADULT - SUBJECTIVE AND OBJECTIVE BOX
Patient is a 63y old  Male who presents with a chief complaint of GIB (29 Jan 2019 20:24)      INTERVAL HPI/OVERNIGHT EVENTS: no events.  restarted on full dose Xarelto yesterday.  as per  staff at bedside patient is acting normally for him.  tolerating diet and walking to bathroom ok.     MEDICATIONS  (STANDING):  cyanocobalamin Injectable 1000 MICROGram(s) IntraMuscular daily  fluticasone propionate 50 MICROgram(s)/spray Nasal Spray 1 Spray(s) Both Nostrils two times a day  fluvoxaMINE 50 milliGRAM(s) Oral two times a day  loratadine 10 milliGRAM(s) Oral daily  pantoprazole    Tablet 40 milliGRAM(s) Oral two times a day  risperiDONE   Tablet 2 milliGRAM(s) Oral two times a day  rivaroxaban 20 milliGRAM(s) Oral every 24 hours  simvastatin 20 milliGRAM(s) Oral at bedtime  sucralfate 1 Gram(s) Oral two times a day  tamsulosin 0.4 milliGRAM(s) Oral at bedtime    MEDICATIONS  (PRN):      Allergies  No Known Allergies    REVIEW OF SYSTEMS:  CONSTITUTIONAL: No fever, weight loss, or fatigue  EYES: No eye pain, visual disturbances, or discharge  ENMT:  No difficulty hearing, tinnitus, vertigo; No sinus or throat pain  NECK: No pain or stiffness  BREASTS: No pain, masses, or nipple discharge  RESPIRATORY: No cough, wheezing, chills or hemoptysis; No shortness of breath  CARDIOVASCULAR: No chest pain, palpitations, lightheadedness, or leg swelling  GASTROINTESTINAL: No abdominal or epigastric pain. No nausea, vomiting, or hematemesis; No diarrhea or constipation. No melena or hematochezia.  GENITOURINARY: No dysuria, frequency, hematuria, or incontinence  NEUROLOGICAL: No headaches, memory loss, vertigo, loss of strength, numbness, or tremors  SKIN: No itching, burning, rashes, or lesions   LYMPH NODES: No enlarged glands  ENDOCRINE: No heat or cold intolerance; No hair loss; No polydipsia or polyuria  MUSCULOSKELETAL: No joint pain or swelling; No muscle, back, or extremity pain  PSYCHIATRIC: No depression, anxiety, or mood swings  HEME/LYMPH: No easy bruising, or bleeding gums  ALLERGY AND IMMUNOLOGIC: No hives or eczema    Vital Signs Last 24 Hrs  T(C): 36.3 (30 Jan 2019 12:46), Max: 36.7 (30 Jan 2019 00:09)  T(F): 97.3 (30 Jan 2019 12:46), Max: 98.1 (30 Jan 2019 00:09)  HR: 64 (30 Jan 2019 12:00) (60 - 100)  BP: 119/67 (30 Jan 2019 12:00) (90/61 - 119/67)  BP(mean): 80 (30 Jan 2019 12:00) (68 - 91)  RR: 16 (30 Jan 2019 12:00) (13 - 23)  SpO2: 99% (30 Jan 2019 12:00) (92% - 99%)    PHYSICAL EXAM:  GENERAL: NAD, well-groomed, well-developed  HEAD:  Atraumatic, Normocephalic  EYES:conjunctiva and sclera clear  ENMT: Moist mucous membranes,  NECK: Supple, No JVD  NERVOUS SYSTEM:  Alert and awake, Good concentration; All 4 extremities mobile, no gross sensory deficits.   CHEST/LUNG: Clear to auscultation bilaterally;   HEART: Regular rate and rhythm;   ABDOMEN: Soft, Nontender, Nondistended; Bowel sounds present  EXTREMITIES:  2+ Peripheral Pulses, No clubbing, cyanosis, or edema  LYMPH: No lymphadenopathy noted  SKIN: No rashes or lesions    LABS:                        8.7    8.27  )-----------( 238      ( 30 Jan 2019 06:53 )             26.5     30 Jan 2019 06:53    142    |  107    |  13     ----------------------------<  101    3.9     |  29     |  0.82     Ca    9.3        30 Jan 2019 06:53      PT/INR - ( 30 Jan 2019 06:51 )   PT: 19.5 sec;   INR: 1.76 ratio         PTT - ( 30 Jan 2019 06:51 )  PTT:50.0 sec    CAPILLARY BLOOD GLUCOSE          RADIOLOGY & ADDITIONAL TESTS:    Imaging Personally Reviewed:  [ ] YES     Consultant(s) Notes Reviewed:      Care Discussed with Consultants/Other Providers:    Advanced Directives: [ ] DNR  [ ] No feeding tube  [ ] MOLST in chart  [ ] MOLST completed today  [ ] Unknown

## 2019-01-30 NOTE — PROGRESS NOTE ADULT - SUBJECTIVE AND OBJECTIVE BOX
63M with PMHx of MR, autism, non-verbal, bipolar disorder, schizophrenia, gerd, htn, hld, hematuria, bph, admitted with ABLA due to GIB. Pt is s/p EGD which showed gastric and duodenal ulcers    24 hour events: Pt seen and examined at bedside. Chart/labs reviewed. Restarted on AC. Pt tolerating PO diet. No further melena    PAST MEDICAL & SURGICAL HISTORY:  Mental retardation  Aggressiveness  Agitation  Nonverbal  Autism  Bipolar affective disorder  Schizophrenia, paranoid type  GERD (gastroesophageal reflux disease)  Hypertension  Hyperlipidemia  Hematuria  BPH (benign prostatic hypertrophy) with urinary obstruction  Fracture of left malleolus: repaired in 7/2018      Review of Systems:  unable to obtain    T(F): 97.5 (01-30-19 @ 20:07), Max: 98.1 (01-30-19 @ 00:09)  HR: 97 (01-30-19 @ 19:35) (60 - 97)  BP: 108/68 (01-30-19 @ 18:00) (92/61 - 119/67)  RR: 25 (01-30-19 @ 19:35) (13 - 25)  SpO2: 97% (01-30-19 @ 19:35) (92% - 100%)  Wt(kg): --        I&O's Summary    29 Jan 2019 07:01  -  30 Jan 2019 07:00  --------------------------------------------------------  IN: 300 mL / OUT: 1775 mL / NET: -1475 mL    30 Jan 2019 07:01  -  30 Jan 2019 21:12  --------------------------------------------------------  IN: 540 mL / OUT: 400 mL / NET: 140 mL        Physical Exam:     Gen: Lying in bed  Neuro: Awake, follows some commands  HEENT: NC/AT  Resp: CTA b/l  CVS: nl S1/S2, RRR  Abd: soft, nt, nd, +bs  Ext: no edema, +pulses  Skin: well perfused, warm      Meds:    tamsulosin Oral  simvastatin Oral  loratadine Oral  fluvoxaMINE Oral  risperiDONE   Tablet Oral  rivaroxaban Oral  pantoprazole    Tablet Oral  sucralfate Oral  cyanocobalamin Injectable IntraMuscular  fluticasone propionate 50 MICROgram(s)/spray Nasal Spray Both Nostrils                              8.7    8.27  )-----------( 238      ( 30 Jan 2019 06:53 )             26.5       01-30    142  |  107  |  13  ----------------------------<  101<H>  3.9   |  29  |  0.82    Ca    9.3      30 Jan 2019 06:53  Phos  3.8     01-29  Mg     1.9     01-29            PT/INR - ( 30 Jan 2019 06:51 )   PT: 19.5 sec;   INR: 1.76 ratio         PTT - ( 30 Jan 2019 06:51 )  PTT:50.0 sec          CENTRAL LINE: no    CORDOVA: no    A-LINE: no    GLOBAL ISSUE/BEST PRACTICE:  Analgesia: no  Sedation: no  HOB elevation: yes  Stress ulcer prophylaxis: yes  VTE prophylaxis: yes  Glycemic control: no  Nutrition: yes      CODE STATUS: Full  GO discussion: Y

## 2019-01-30 NOTE — PROGRESS NOTE ADULT - SUBJECTIVE AND OBJECTIVE BOX
INTERVAL HPI/OVERNIGHT EVENTS:  No new overnight event.  No N/V/D.  Tolerating diet.   no melena  Allergies    No Known Allergies    Intolerances          General:  No wt loss, fevers, chills, night sweats, fatigue,   Eyes:  Good vision, no reported pain  ENT:  No sore throat, pain, runny nose, dysphagia  CV:  No pain, palpitations, hypo/hypertension  Resp:  No dyspnea, cough, tachypnea, wheezing  GI:  No pain, No nausea, No vomiting, No diarrhea, No constipation, No weight loss, No fever, No pruritis, No rectal bleeding, No tarry stools, No dysphagia,  :  No pain, bleeding, incontinence, nocturia  Muscle:  No pain, weakness  Neuro:  No weakness, tingling, memory problems  Psych:  No fatigue, insomnia, mood problems, depression  Endocrine:  No polyuria, polydipsia, cold/heat intolerance  Heme:  No petechiae, ecchymosis, easy bruisability  Skin:  No rash, tattoos, scars, edema      PHYSICAL EXAM:   Vital Signs:  Vital Signs Last 24 Hrs  T(C): 36.3 (2019 12:46), Max: 36.7 (2019 00:09)  T(F): 97.3 (2019 12:46), Max: 98.1 (2019 00:09)  HR: 87 (2019 14:00) (60 - 100)  BP: 115/68 (2019 14:00) (90/61 - 119/67)  BP(mean): 77 (2019 14:00) (68 - 91)  RR: 19 (2019 14:00) (13 - 23)  SpO2: 98% (2019 14:00) (92% - 99%)  Daily     Daily Weight in k.9 (2019 04:02)I&O's Summary    2019 07:01  -  2019 07:00  --------------------------------------------------------  IN: 300 mL / OUT: 1775 mL / NET: -1475 mL        GENERAL:  Appears stated age, well-groomed, well-nourished, no distress  HEENT:  NC/AT,  conjunctivae clear and pink, no thyromegaly, nodules, adenopathy, no JVD, sclera -anicteric  CHEST:  Full & symmetric excursion, no increased effort, breath sounds clear  HEART:  Regular rhythm, S1, S2, no murmur/rub/S3/S4, no abdominal bruit, no edema  ABDOMEN:  Soft, non-tender, non-distended, normoactive bowel sounds,  no masses ,no hepato-splenomegaly, no signs of chronic liver disease  EXTEREMITIES:  no cyanosis,clubbing or edema  SKIN:  No rash/erythema/ecchymoses/petechiae/wounds/abscess/warm/dry  NEURO:  Alert, oriented, no asterixis, no tremor, no encephalopathy      LABS:                        8.7    8.27  )-----------( 238      ( 2019 06:53 )             26.5         142  |  107  |  13  ----------------------------<  101<H>  3.9   |  29  |  0.82    Ca    9.3      2019 06:53  Phos  3.8       Mg     1.9           PT/INR - ( 2019 06:51 )   PT: 19.5 sec;   INR: 1.76 ratio         PTT - ( 2019 06:51 )  PTT:50.0 sec    amylase   lipase  RADIOLOGY & ADDITIONAL TESTS:

## 2019-01-30 NOTE — PROGRESS NOTE ADULT - REASON FOR ADMISSION
GI bleeding
GIB
GIB
Anemia, GIB, DVT
Weakness
heme consulted for anemia
anemia gi bleed
gi bleed
Pale/syncope at group home

## 2019-01-30 NOTE — PROGRESS NOTE ADULT - ASSESSMENT
63M with PMHx as above, admitted with ABLA due to UGIB ( gastric and duodenal ulcers), Hypomagnesemia    -Continue psych meds  -Monitor HD's. AC resumed for DVT  -Respiratory stable. Monitor O2 saturation  -PO diet as tolerated. Continue PPI  -GI recommendations appreciated  -Monitor for further melena  -Replete hypomagnesemia  -Trend H/H. No further melena thus far. Monitor  -Transfuse prn to keep Hgb > 7  -Continue AC for left femoral/popliteal DVT  -Supportive care

## 2019-01-30 NOTE — PROGRESS NOTE ADULT - SUBJECTIVE AND OBJECTIVE BOX
Date/Time Patient Seen:  		  Referring MD:   Data Reviewed	       Patient is a 63y old  Male who presents with a chief complaint of GIB (29 Jan 2019 20:24)      Subjective/HPI     PAST MEDICAL & SURGICAL HISTORY:  Fall (on) (from) other stairs and steps, initial encounter  Mental retardation  Aggressiveness  Agitation  Nonverbal  Autism  Bipolar affective disorder  Schizophrenia, paranoid type  GERD (gastroesophageal reflux disease)  Hypertension  Hyperlipidemia  Hematuria  BPH (benign prostatic hypertrophy) with urinary obstruction  Fracture of left malleolus: repaired in 7/2018  No significant past surgical history        Medication list         MEDICATIONS  (STANDING):  cyanocobalamin Injectable 1000 MICROGram(s) IntraMuscular daily  fluticasone propionate 50 MICROgram(s)/spray Nasal Spray 1 Spray(s) Both Nostrils two times a day  fluvoxaMINE 50 milliGRAM(s) Oral two times a day  loratadine 10 milliGRAM(s) Oral daily  pantoprazole    Tablet 40 milliGRAM(s) Oral two times a day  risperiDONE   Tablet 2 milliGRAM(s) Oral two times a day  rivaroxaban 20 milliGRAM(s) Oral every 24 hours  simvastatin 20 milliGRAM(s) Oral at bedtime  sucralfate 1 Gram(s) Oral two times a day  tamsulosin 0.4 milliGRAM(s) Oral at bedtime    MEDICATIONS  (PRN):         Vitals log        ICU Vital Signs Last 24 Hrs  T(C): 36.3 (30 Jan 2019 08:00), Max: 36.7 (30 Jan 2019 00:09)  T(F): 97.3 (30 Jan 2019 08:00), Max: 98.1 (30 Jan 2019 00:09)  HR: 88 (30 Jan 2019 08:00) (60 - 102)  BP: 115/78 (30 Jan 2019 08:00) (90/61 - 118/78)  BP(mean): 86 (30 Jan 2019 08:00) (68 - 91)  ABP: --  ABP(mean): --  RR: 18 (30 Jan 2019 08:00) (13 - 23)  SpO2: 97% (30 Jan 2019 08:00) (92% - 99%)           Input and Output:  I&O's Detail    29 Jan 2019 07:01  -  30 Jan 2019 07:00  --------------------------------------------------------  IN:    Oral Fluid: 300 mL  Total IN: 300 mL    OUT:    Voided: 1775 mL  Total OUT: 1775 mL    Total NET: -1475 mL          Lab Data                        8.7    8.27  )-----------( 238      ( 30 Jan 2019 06:53 )             26.5     01-30    142  |  107  |  13  ----------------------------<  101<H>  3.9   |  29  |  0.82    Ca    9.3      30 Jan 2019 06:53  Phos  3.8     01-29  Mg     1.9     01-29              Review of Systems	      Objective     Physical Examination      heart s1s2  lung dec BS  abd soft  non verbal    Pertinent Lab findings & Imaging      Tegan:  NO   Adequate UO     I&O's Detail    29 Jan 2019 07:01  -  30 Jan 2019 07:00  --------------------------------------------------------  IN:    Oral Fluid: 300 mL  Total IN: 300 mL    OUT:    Voided: 1775 mL  Total OUT: 1775 mL    Total NET: -1475 mL               Discussed with:     Cultures:	        Radiology

## 2019-01-30 NOTE — PROGRESS NOTE ADULT - PROBLEM SELECTOR PLAN 1
gi bleed, anemia, PUD - s/p EGD  Hgb stable  DVT - on Xarelto - monitor HD and Hgb  autism, mrdd, non verbal - supportive medical regimen and care and assist with ADL  dc planning  cont PPI  GI follow up noted  LE dopplers reviewed

## 2019-01-31 ENCOUNTER — TRANSCRIPTION ENCOUNTER (OUTPATIENT)
Age: 64
End: 2019-01-31

## 2019-01-31 VITALS
RESPIRATION RATE: 20 BRPM | HEART RATE: 85 BPM | OXYGEN SATURATION: 98 % | DIASTOLIC BLOOD PRESSURE: 72 MMHG | SYSTOLIC BLOOD PRESSURE: 106 MMHG

## 2019-01-31 LAB
HCT VFR BLD CALC: 28.1 % — LOW (ref 39–50)
HGB BLD-MCNC: 9.2 G/DL — LOW (ref 13–17)
MCHC RBC-ENTMCNC: 29.2 PG — SIGNIFICANT CHANGE UP (ref 27–34)
MCHC RBC-ENTMCNC: 32.7 GM/DL — SIGNIFICANT CHANGE UP (ref 32–36)
MCV RBC AUTO: 89.2 FL — SIGNIFICANT CHANGE UP (ref 80–100)
NRBC # BLD: 0 /100 WBCS — SIGNIFICANT CHANGE UP (ref 0–0)
PLATELET # BLD AUTO: 268 K/UL — SIGNIFICANT CHANGE UP (ref 150–400)
RBC # BLD: 3.15 M/UL — LOW (ref 4.2–5.8)
RBC # FLD: 15.7 % — HIGH (ref 10.3–14.5)
WBC # BLD: 9.71 K/UL — SIGNIFICANT CHANGE UP (ref 3.8–10.5)
WBC # FLD AUTO: 9.71 K/UL — SIGNIFICANT CHANGE UP (ref 3.8–10.5)

## 2019-01-31 PROCEDURE — 88342 IMHCHEM/IMCYTCHM 1ST ANTB: CPT

## 2019-01-31 PROCEDURE — 86140 C-REACTIVE PROTEIN: CPT

## 2019-01-31 PROCEDURE — 99239 HOSP IP/OBS DSCHRG MGMT >30: CPT

## 2019-01-31 PROCEDURE — 86803 HEPATITIS C AB TEST: CPT

## 2019-01-31 PROCEDURE — 83540 ASSAY OF IRON: CPT

## 2019-01-31 PROCEDURE — 83605 ASSAY OF LACTIC ACID: CPT

## 2019-01-31 PROCEDURE — 36430 TRANSFUSION BLD/BLD COMPNT: CPT

## 2019-01-31 PROCEDURE — 81001 URINALYSIS AUTO W/SCOPE: CPT

## 2019-01-31 PROCEDURE — 85045 AUTOMATED RETICULOCYTE COUNT: CPT

## 2019-01-31 PROCEDURE — 86901 BLOOD TYPING SEROLOGIC RH(D): CPT

## 2019-01-31 PROCEDURE — 71045 X-RAY EXAM CHEST 1 VIEW: CPT

## 2019-01-31 PROCEDURE — 74177 CT ABD & PELVIS W/CONTRAST: CPT

## 2019-01-31 PROCEDURE — 80048 BASIC METABOLIC PNL TOTAL CA: CPT

## 2019-01-31 PROCEDURE — 99285 EMERGENCY DEPT VISIT HI MDM: CPT | Mod: 25

## 2019-01-31 PROCEDURE — 86900 BLOOD TYPING SEROLOGIC ABO: CPT

## 2019-01-31 PROCEDURE — 82746 ASSAY OF FOLIC ACID SERUM: CPT

## 2019-01-31 PROCEDURE — 83735 ASSAY OF MAGNESIUM: CPT

## 2019-01-31 PROCEDURE — 85610 PROTHROMBIN TIME: CPT

## 2019-01-31 PROCEDURE — 82607 VITAMIN B-12: CPT

## 2019-01-31 PROCEDURE — 85652 RBC SED RATE AUTOMATED: CPT

## 2019-01-31 PROCEDURE — 36415 COLL VENOUS BLD VENIPUNCTURE: CPT

## 2019-01-31 PROCEDURE — 82272 OCCULT BLD FECES 1-3 TESTS: CPT

## 2019-01-31 PROCEDURE — 80053 COMPREHEN METABOLIC PANEL: CPT

## 2019-01-31 PROCEDURE — 84100 ASSAY OF PHOSPHORUS: CPT

## 2019-01-31 PROCEDURE — 82728 ASSAY OF FERRITIN: CPT

## 2019-01-31 PROCEDURE — 93005 ELECTROCARDIOGRAM TRACING: CPT

## 2019-01-31 PROCEDURE — 85730 THROMBOPLASTIN TIME PARTIAL: CPT

## 2019-01-31 PROCEDURE — 83550 IRON BINDING TEST: CPT

## 2019-01-31 PROCEDURE — 96365 THER/PROPH/DIAG IV INF INIT: CPT

## 2019-01-31 PROCEDURE — 88312 SPECIAL STAINS GROUP 1: CPT

## 2019-01-31 PROCEDURE — 87640 STAPH A DNA AMP PROBE: CPT

## 2019-01-31 PROCEDURE — 85027 COMPLETE CBC AUTOMATED: CPT

## 2019-01-31 PROCEDURE — 88305 TISSUE EXAM BY PATHOLOGIST: CPT

## 2019-01-31 PROCEDURE — P9016: CPT

## 2019-01-31 PROCEDURE — 86923 COMPATIBILITY TEST ELECTRIC: CPT

## 2019-01-31 PROCEDURE — 87040 BLOOD CULTURE FOR BACTERIA: CPT

## 2019-01-31 PROCEDURE — 86850 RBC ANTIBODY SCREEN: CPT

## 2019-01-31 PROCEDURE — 94640 AIRWAY INHALATION TREATMENT: CPT

## 2019-01-31 PROCEDURE — 93970 EXTREMITY STUDY: CPT

## 2019-01-31 PROCEDURE — 87086 URINE CULTURE/COLONY COUNT: CPT

## 2019-01-31 PROCEDURE — 83690 ASSAY OF LIPASE: CPT

## 2019-01-31 RX ORDER — SUCRALFATE 1 G
1 TABLET ORAL
Qty: 60 | Refills: 0
Start: 2019-01-31 | End: 2019-03-01

## 2019-01-31 RX ORDER — PANTOPRAZOLE SODIUM 20 MG/1
1 TABLET, DELAYED RELEASE ORAL
Qty: 60 | Refills: 0
Start: 2019-01-31 | End: 2019-03-01

## 2019-01-31 RX ORDER — RIVAROXABAN 15 MG-20MG
1 KIT ORAL
Qty: 0 | Refills: 0 | COMMUNITY

## 2019-01-31 RX ORDER — RISPERIDONE 4 MG/1
1 TABLET ORAL
Qty: 0 | Refills: 0 | DISCHARGE
Start: 2019-01-31

## 2019-01-31 RX ORDER — RIVAROXABAN 15 MG-20MG
1 KIT ORAL
Qty: 0 | Refills: 0 | DISCHARGE
Start: 2019-01-31

## 2019-01-31 RX ORDER — ASPIRIN/CALCIUM CARB/MAGNESIUM 324 MG
1 TABLET ORAL
Qty: 0 | Refills: 0 | COMMUNITY

## 2019-01-31 RX ADMIN — Medication 1 GRAM(S): at 06:00

## 2019-01-31 RX ADMIN — LORATADINE 10 MILLIGRAM(S): 10 TABLET ORAL at 12:05

## 2019-01-31 RX ADMIN — FLUVOXAMINE MALEATE 50 MILLIGRAM(S): 25 TABLET ORAL at 06:00

## 2019-01-31 RX ADMIN — PREGABALIN 1000 MICROGRAM(S): 225 CAPSULE ORAL at 12:05

## 2019-01-31 RX ADMIN — Medication 1 SPRAY(S): at 06:03

## 2019-01-31 RX ADMIN — PANTOPRAZOLE SODIUM 40 MILLIGRAM(S): 20 TABLET, DELAYED RELEASE ORAL at 06:00

## 2019-01-31 RX ADMIN — RISPERIDONE 2 MILLIGRAM(S): 4 TABLET ORAL at 06:01

## 2019-01-31 NOTE — PROGRESS NOTE ADULT - PROBLEM SELECTOR PLAN 1
hiatal hernia, atelectasis, GI bleed, anemia,   s/p EGD - PUD  am Hgb pending  on AC - Xarelto - hx of DVT  DC planned for today for group HOME - mrdd, assist with ADL as needed

## 2019-01-31 NOTE — PROGRESS NOTE ADULT - PROVIDER SPECIALTY LIST ADULT
Critical Care
Gastroenterology
Heme/Onc
Heme/Onc
Hospitalist
MICU
Pulmonology
Heme/Onc

## 2019-01-31 NOTE — DISCHARGE NOTE ADULT - CARE PROVIDER_API CALL
PMD at ,   Phone: (   )    -  Fax: (   )    -    Hansel Aviles (DO)  Gastroenterology; Internal Medicine  [address] [place] [id number]  Phone: [phone]  Fax: [phone]

## 2019-01-31 NOTE — DISCHARGE NOTE ADULT - HOSPITAL COURSE
63M PMHx of aggressiveness, agitation, autism, bipolar affective disorder, BPH, GERD, hematuria, HLD, HTN, developmental delay, schizophrenia, paranoid type presented s/p fall.  Pt is non verbal and is unable to provide history.  Per group home reports pt was being helped in the restroom when he suddenly became weak in the legs and dropped to the ground.  The staff reports that they lowered him gently to the floor on his buttocks.  Pt did not strike his head.  He then appeared to be shaking and his lips almost looked to be blue.  Staff also reports that pt appears to be paler then normal.    Of noted pt was admitted to the hospital 11/18 and was found to have a left lower ext DVT.  He was placed on Xarelto at that time.   In the ED patient found to have guaiac positive acute anemia.  He was admitted to [facility] with diagnosis of acute anemia due to GI hemorrhage.  He recieved 3 units of PRBC.  His aspirin and Xarelto were held.  He had an EGD with Dr Aviles which showed esophageal ulcer, gastritis and gastric ulcer without acute bleeding.   He was seen by Hematology.  He was restarted on first prophylactic dose then full dose AC and monitored but there are no signs of bleeding.   Patient cleared to return to  with outpatient follow up.         PHYSICAL EXAM:  Vital Signs Last 24 Hrs  T(C): 36.7 (31 Jan 2019 08:59), Max: 36.8 (31 Jan 2019 04:25)  T(F): 98.1 (31 Jan 2019 08:59), Max: 98.2 (31 Jan 2019 04:25)  HR: 92 (31 Jan 2019 10:00) (52 - 97)  BP: 107/74 (31 Jan 2019 10:00) (84/43 - 119/67)  BP(mean): 83 (31 Jan 2019 10:00) (56 - 90)  RR: 19 (31 Jan 2019 10:00) (15 - 25)  SpO2: 97% (31 Jan 2019 10:00) (94% - 100%)    GENERAL: NAD, well-groomed, well-developed  EYES:  conjunctiva and sclera clear  ENMT:  Moist mucous membranes  NERVOUS SYSTEM:  non-verbal but alert and moving all extremities.   CHEST/LUNG: Clear to auscultation bilaterally;  HEART: Regular rate and rhythm  ABDOMEN: Soft, Nontender, Nondistended; Bowel sounds present  EXTREMITIES:  2+ Peripheral Pulses      Case was d/w [facility] PAYNE as well has his brother Alexis.  All questions were answered. 63M PMHx of aggressiveness, agitation, autism, bipolar affective disorder, BPH, GERD, hematuria, HLD, HTN, developmental delay, schizophrenia, paranoid type presented s/p fall.  Pt is non verbal and is unable to provide history.  Per group home reports pt was being helped in the restroom when he suddenly became weak in the legs and dropped to the ground.  The staff reports that they lowered him gently to the floor on his buttocks.  Pt did not strike his head.  He then appeared to be shaking and his lips almost looked to be blue.  Staff also reports that pt appears to be paler then normal.    Of noted pt was admitted to the hospital 11/18 and was found to have a left lower ext DVT.  He was placed on Xarelto at that time.   In the ED patient found to have guaiac positive acute anemia.  He was admitted to [facility] with diagnosis of acute anemia due to GI hemorrhage.  He recieved 3 units of PRBC.  His aspirin and Xarelto were held.  He had an EGD with Dr Aviles which showed esophageal ulcer, gastritis and gastric ulcer without acute bleeding.   He was seen by Hematology.  He was restarted on first prophylactic dose then full dose AC and monitored but there are no signs of bleeding.   Patient cleared to return to  with outpatient follow up.         PHYSICAL EXAM:  Vital Signs Last 24 Hrs  T(C): 36.7 (31 Jan 2019 08:59), Max: 36.8 (31 Jan 2019 04:25)  T(F): 98.1 (31 Jan 2019 08:59), Max: 98.2 (31 Jan 2019 04:25)  HR: 92 (31 Jan 2019 10:00) (52 - 97)  BP: 107/74 (31 Jan 2019 10:00) (84/43 - 119/67)  BP(mean): 83 (31 Jan 2019 10:00) (56 - 90)  RR: 19 (31 Jan 2019 10:00) (15 - 25)  SpO2: 97% (31 Jan 2019 10:00) (94% - 100%)    GENERAL: NAD, well-groomed, well-developed  EYES:  conjunctiva and sclera clear  ENMT:  Moist mucous membranes  NERVOUS SYSTEM:  non-verbal but alert and moving all extremities.   CHEST/LUNG: Clear to auscultation bilaterally;  HEART: Regular rate and rhythm  ABDOMEN: Soft, Nontender, Nondistended; Bowel sounds present  EXTREMITIES:  2+ Peripheral Pulses      Case was d/w [facility] RN as well has his brother Alexis.  All questions were answered.   DC time with patient and coordination of care approx 50 min.

## 2019-01-31 NOTE — DISCHARGE NOTE ADULT - CARE PLAN
Principal Discharge DX:	Gastrointestinal hemorrhage associated with gastric ulcer  Goal:	follow up  Assessment and plan of treatment:	new medications : protonix and carafate have been prescribed to [facility] drugs.  needs PMD follow up within 10 days.  needs GI follow up (either primary or Dr Aviles) within 21 days.  resume previous activity and diet, in addition attempt to avoid high acidic foods  Stop aspirin - needs GI clearance to resume.  Secondary Diagnosis:	Chronic deep vein thrombosis (DVT) of lower extremity, unspecified laterality, unspecified vein  Assessment and plan of treatment:	Xarelto with dinner to be completed Feb 12 - 3 months from original diagnosis.

## 2019-01-31 NOTE — PROGRESS NOTE ADULT - PROBLEM SELECTOR PROBLEM 1
Anemia due to acute blood loss
GI bleed
Gastrointestinal hemorrhage, unspecified gastrointestinal hemorrhage type
Gastrointestinal hemorrhage, unspecified gastrointestinal hemorrhage type
Hiatal hernia
Anemia due to acute blood loss
GI bleed

## 2019-01-31 NOTE — DISCHARGE NOTE ADULT - INSTRUCTIONS
resume previous diet.   in addition for 30 days try to avoid/limit high acidic foods (do not need to restrict completely but only give in small amounts) including citrus products, tomato sauces, deep fried foods, onions.

## 2019-01-31 NOTE — PROGRESS NOTE ADULT - SUBJECTIVE AND OBJECTIVE BOX
Date/Time Patient Seen:  		  Referring MD:   Data Reviewed	       Patient is a 63y old  Male who presents with a chief complaint of anemia gi bleed (30 Jan 2019 15:25)      Subjective/HPI     PAST MEDICAL & SURGICAL HISTORY:  Fall (on) (from) other stairs and steps, initial encounter  Mental retardation  Aggressiveness  Agitation  Nonverbal  Autism  Bipolar affective disorder  Schizophrenia, paranoid type  GERD (gastroesophageal reflux disease)  Hypertension  Hyperlipidemia  Hematuria  BPH (benign prostatic hypertrophy) with urinary obstruction  Fracture of left malleolus: repaired in 7/2018  No significant past surgical history        Medication list         MEDICATIONS  (STANDING):  cyanocobalamin Injectable 1000 MICROGram(s) IntraMuscular daily  fluticasone propionate 50 MICROgram(s)/spray Nasal Spray 1 Spray(s) Both Nostrils two times a day  fluvoxaMINE 50 milliGRAM(s) Oral two times a day  loratadine 10 milliGRAM(s) Oral daily  pantoprazole    Tablet 40 milliGRAM(s) Oral two times a day  risperiDONE   Tablet 2 milliGRAM(s) Oral two times a day  rivaroxaban 20 milliGRAM(s) Oral every 24 hours  simvastatin 20 milliGRAM(s) Oral at bedtime  sucralfate 1 Gram(s) Oral two times a day  tamsulosin 0.4 milliGRAM(s) Oral at bedtime    MEDICATIONS  (PRN):         Vitals log        ICU Vital Signs Last 24 Hrs  T(C): 36.8 (31 Jan 2019 04:25), Max: 36.8 (31 Jan 2019 04:25)  T(F): 98.2 (31 Jan 2019 04:25), Max: 98.2 (31 Jan 2019 04:25)  HR: 73 (31 Jan 2019 06:00) (52 - 97)  BP: 109/52 (31 Jan 2019 06:00) (84/43 - 119/67)  BP(mean): 68 (31 Jan 2019 06:00) (56 - 85)  ABP: --  ABP(mean): --  RR: 20 (31 Jan 2019 06:00) (15 - 25)  SpO2: 99% (31 Jan 2019 06:00) (94% - 100%)           Input and Output:  I&O's Detail    30 Jan 2019 07:01  -  31 Jan 2019 07:00  --------------------------------------------------------  IN:    Oral Fluid: 780 mL  Total IN: 780 mL    OUT:    Voided: 600 mL  Total OUT: 600 mL    Total NET: 180 mL          Lab Data                        8.7    8.27  )-----------( 238      ( 30 Jan 2019 06:53 )             26.5     01-30    142  |  107  |  13  ----------------------------<  101<H>  3.9   |  29  |  0.82    Ca    9.3      30 Jan 2019 06:53              Review of Systems	      Objective     Physical Examination    heart s1s2  lung dec BS  abd soft      Pertinent Lab findings & Imaging      Tegan:  DIEGO   Adequate UO     I&O's Detail    30 Jan 2019 07:01  -  31 Jan 2019 07:00  --------------------------------------------------------  IN:    Oral Fluid: 780 mL  Total IN: 780 mL    OUT:    Voided: 600 mL  Total OUT: 600 mL    Total NET: 180 mL               Discussed with:     Cultures:	        Radiology

## 2019-01-31 NOTE — DISCHARGE NOTE ADULT - MEDICATION SUMMARY - MEDICATIONS TO TAKE
I will START or STAY ON the medications listed below when I get home from the hospital:    chlorhexadine rinse  -- 0.5 dose(s) by mouth 2 times a day Swish and spit out  -- Indication: For Mouthwash    Tylenol 325 mg oral tablet  -- 2 tab(s) by mouth every 6 hours, As Needed  -- Indication: For Pain    Flomax 0.4 mg oral capsule  -- 1 cap(s) by mouth once a day  -- Indication: For Bph    rivaroxaban 20 mg oral tablet  -- 1 tab(s) by mouth every 24 hours  -- Indication: For DVT (deep venous thrombosis)    fluvoxaMINE 100 mg oral tablet  -- 0.5 tab(s) by mouth 2 times a day  -- Indication: For Mood disorder    loratadine 10 mg oral tablet  -- 1 tab(s) by mouth once a day  -- Indication: For Seasonal allergies.     niacin 1000 mg oral tablet, extended release  -- 1 tab(s) by mouth once a day (at bedtime)  -- Indication: For Other hyperlipidemia    pravastatin 40 mg oral tablet  -- 1 tab(s) by mouth once a day (at bedtime)  -- Indication: For Other hyperlipidemia    risperiDONE 2 mg oral tablet  -- 1 tab(s) by mouth 2 times a day  -- Indication: For Bipolar affective disorder    sucralfate 1 g oral tablet  -- 1 tab(s) by mouth 2 times a day  -- Indication: For Gastrointestinal hemorrhage    fluticasone 50 mcg/inh nasal spray  -- 1 spray(s) into nose 2 times a day  -- Indication: For Nasal congestion    pantoprazole 40 mg oral delayed release tablet  -- 1 tab(s) by mouth 2 times a day  -- Indication: For Gastrointestinal hemorrhage    Calcium 600+D oral tablet  -- 1 tab(s) by mouth 2 times a day  -- Indication: For vitamin    Daily Multi oral tablet  -- 1 tab(s) by mouth once a day  -- Indication: For vitamin

## 2019-01-31 NOTE — DISCHARGE NOTE ADULT - PATIENT PORTAL LINK FT
You can access the [facility] Patient Portal, offered by [facility], by registering with the following website: [email or web address]

## 2019-01-31 NOTE — DISCHARGE NOTE ADULT - PLAN OF CARE
follow up new medications : protonix and carafate have been prescribed to [facility].  needs PMD follow up within 10 days.  needs GI follow up (either primary or Dr Aviles) within 21 days.  resume previous activity and diet, in addition attempt to avoid high acidic foods  Stop aspirin - needs GI clearance to resume. Xarelto with dinner to be completed Feb 12 - 3 months from original diagnosis.

## 2019-02-08 ENCOUNTER — APPOINTMENT (OUTPATIENT)
Dept: ORTHOPEDIC SURGERY | Facility: CLINIC | Age: 64
End: 2019-02-08
Payer: MEDICARE

## 2019-02-08 PROCEDURE — 73610 X-RAY EXAM OF ANKLE: CPT | Mod: LT

## 2019-02-08 PROCEDURE — 99213 OFFICE O/P EST LOW 20 MIN: CPT

## 2019-02-08 NOTE — HISTORY OF PRESENT ILLNESS
[FreeTextEntry1] : 63 year year old male presenting with left ankle pain. The patient’s pain is noted to be a 3/10. The patient describes their pain and swelling as the same compared to their last visit. The patient is accompanied by a nurse who notes that the patient was recently hospitalized. She also notes that the patient is currently walking with a limp and recently developed a stomach ulcer.  He is currently taking Tylenol.  The patient reports that they are currently not attending physical therapy. The patient has a history of DVT. No other complaints at this time.

## 2019-02-08 NOTE — DISCUSSION/SUMMARY
[de-identified] : Today I had a lengthy discussion with the patient regarding their left ankle pain.I have addressed all the patient's concerns surrounding the pathology of their condition. I advised the patient to utilize ice, NSAIDs PRN, and heat. They can also elevate their left ankle above the level of their heart. A discussion was had about the use of OTC vs custom bracing. At this time, I recommend that the patient utilize OTC bracing. The patient was shown custom bracing options in the office today. I would like to see the pt back in the office in 2-3 months to reassess their condition. At this time, the patient is clear to participate in his program. The patient can also weight bear as tolerated. The patient understood and verbally agreed to the treatment plan. All of their questions were answered and they were satisfied with the visit. The patient should call the office if they have any questions or experience worsening symptoms.

## 2019-02-08 NOTE — PHYSICAL EXAM
[de-identified] : General: Alert and oriented x3. In no acute distress. Pleasant in nature with a normal affect. No apparent respiratory distress.\par \par L Ankle Exam \par \par Skin: Clean, dry, intact\par Inspection: No obvious malalignment, no swelling, no effusion; no lymphadenopathy\par Pulses: 2+ DP/PT pulses\par ROM: Left: -10 degrees of dorsiflexion, 30 degrees of plantarflexion, 10 degrees of subtalar motion\par Tenderness: +Calf pain. No tenderness over the lateral malleolus, no CFL/ATFL/PTFL pain. No medial malleolus pain, no deltoid ligament pain. No proximal fibular pain. No heel pain.\par Stability: Unable to assess.\par Strength:Unable to assess. \par Neuro: In tact to light touch throughout\par Additional tests: Negative Mortons test, Negative syndesmosis squeeze test.\par \par \par \par \par   [de-identified] : 3V of the L ankle were ordered obtained and reviewed by me today, 02/08/2019 , revealed: post traumatic arthritis \par \par \par \par

## 2019-04-08 ENCOUNTER — APPOINTMENT (OUTPATIENT)
Dept: ORTHOPEDIC SURGERY | Facility: CLINIC | Age: 64
End: 2019-04-08
Payer: MEDICARE

## 2019-04-08 PROCEDURE — 73610 X-RAY EXAM OF ANKLE: CPT | Mod: LT

## 2019-04-08 PROCEDURE — 99213 OFFICE O/P EST LOW 20 MIN: CPT

## 2019-04-08 NOTE — DISCUSSION/SUMMARY
[de-identified] : Today I had a lengthy discussion with the patient regarding their left ankle pain.I have addressed all the patient's concerns surrounding the pathology of their condition. The patient is clear to participate in his program as tolerated. The patient should continue to utilize his ASO ankle brace. The patient can weight bear as tolerated. I advised the patient to utilize ice, NSAIDs PRN, and heat. They can also elevate their left ankle above the level of their heart.I also advised the patient to utilize a compression stocking that comes up to the knee. Regarding the patient's DVT, I advised him to follow up with a vascular physician. I would like to see the patient back in the office in 2-3 months to reassess their condition. The patient understood and verbally agreed to the treatment plan. All of their questions were answered and they were satisfied with the visit. The patient should call the office if they have any questions or experience worsening symptoms.

## 2019-04-08 NOTE — ADDENDUM
[FreeTextEntry1] : I, Aaron Palomares, acted solely as a scribe for Dr. Aly Smith on this date 04/08/2019  .\par  \par All medical record entries made by the Scribe were at my, Dr. Aly Smith, direction and personally dictated by me on 04/08/2019 . I have reviewed the chart and agree that the record accurately reflects my personal performance of the history, physical exam, assessment and plan. I have also personally directed, reviewed, and agreed with the chart.\par \par \par

## 2019-04-08 NOTE — PHYSICAL EXAM
[de-identified] : General: Alert and oriented x3. In no acute distress. Pleasant in nature with a normal affect. No apparent respiratory distress.\par \par L Ankle Exam \par Skin: Clean, dry, intact\par Inspection: No obvious malalignment, no swelling, no effusion; no lymphadenopathy\par Pulses: 2+ DP/PT pulses\par ROM: Left: -10 degrees of dorsiflexion, 30 degrees of plantarflexion, 10 degrees of subtalar motion\par Tenderness: +Calf pain. No tenderness over the lateral malleolus, no CFL/ATFL/PTFL pain. No medial malleolus pain, no deltoid ligament pain. No proximal fibular pain. No heel pain.\par Stability: Unable to assess.\par Strength:Unable to assess. \par Neuro: In tact to light touch throughout\par Additional tests: Negative Mortons test, Negative syndesmosis squeeze test.\par \par \par \par \par   [de-identified] : Doppler Test from 3/26/19 and reviewed by me today, 4/8/19, revealed: residual left sided DVT

## 2019-04-08 NOTE — HISTORY OF PRESENT ILLNESS
[FreeTextEntry1] : 64 year year old male presenting with left ankle pain. The patient’s pain is noted to be a 4/10. The patient describes their pain and swelling as the same compared to their last visit. The patient is accompanied by an aid from his facility who states that the patient ambulates with a limp and c/o of stiffness in the left ankle.  The patient's aid also reports that the patient has DVT. The patient describes their pain as improved compared to their last visit. The patient presents today wearing an ASO ankle brace. He is currently taking Tylenol. The patient reports that they are currently not attending physical therapy. No other complaints at this time. \par \par Accompanied by aid.\par Concern for limping gait\par Concern for swelling\par Under care of GI and vascular\par New duplex 3/2019 reviewed.

## 2019-06-17 ENCOUNTER — APPOINTMENT (OUTPATIENT)
Dept: ORTHOPEDIC SURGERY | Facility: CLINIC | Age: 64
End: 2019-06-17

## 2019-06-21 ENCOUNTER — APPOINTMENT (OUTPATIENT)
Dept: CARDIOLOGY | Facility: CLINIC | Age: 64
End: 2019-06-21

## 2019-06-24 ENCOUNTER — APPOINTMENT (OUTPATIENT)
Dept: CARDIOLOGY | Facility: CLINIC | Age: 64
End: 2019-06-24
Payer: MEDICARE

## 2019-06-24 ENCOUNTER — NON-APPOINTMENT (OUTPATIENT)
Age: 64
End: 2019-06-24

## 2019-06-24 VITALS
OXYGEN SATURATION: 96 % | HEIGHT: 68 IN | SYSTOLIC BLOOD PRESSURE: 117 MMHG | DIASTOLIC BLOOD PRESSURE: 72 MMHG | HEART RATE: 87 BPM | WEIGHT: 184 LBS | BODY MASS INDEX: 27.89 KG/M2

## 2019-06-24 PROCEDURE — 93000 ELECTROCARDIOGRAM COMPLETE: CPT

## 2019-06-24 PROCEDURE — 99214 OFFICE O/P EST MOD 30 MIN: CPT | Mod: 25

## 2019-06-24 NOTE — DISCUSSION/SUMMARY
[Hyperlipidemia] : hyperlipidemia [Diet Modification] : diet modification [Exercise] : exercise [Hypertension] : hypertension [Stable] : stable [None] : none [Weight Loss] : weight loss [Exercise Regimen] : an exercise regimen [Sodium Restriction] : sodium restriction

## 2019-06-24 NOTE — PHYSICAL EXAM
[Normal Conjunctiva] : the conjunctiva exhibited no abnormalities [General Appearance - Well Developed] : well developed [Normal Oral Mucosa] : normal oral mucosa [Exaggerated Use Of Accessory Muscles For Inspiration] : no accessory muscle use [Respiration, Rhythm And Depth] : normal respiratory rhythm and effort [] : no respiratory distress [Auscultation Breath Sounds / Voice Sounds] : lungs were clear to auscultation bilaterally [Lungs Percussion] : the lungs were normal to percussion [Chest Palpation] : palpation of the chest revealed no abnormalities [Arterial Pulses Normal] : the arterial pulses were normal [Heart Sounds] : normal S1 and S2 [Heart Rate And Rhythm] : heart rate and rhythm were normal [Edema] : no peripheral edema present [Systolic grade ___/6] : A grade [unfilled]/6 systolic murmur was heard. [Abnormal Walk] : normal gait [Bowel Sounds] : normal bowel sounds [Abdomen Soft] : soft [Nail Clubbing] : no clubbing of the fingernails [No Anxiety] : not feeling anxious [Normal Appearance] : was normal in appearance [FreeTextEntry1] : multiple scratch marks on both forearms  , right ankle swelling

## 2019-06-24 NOTE — HISTORY OF PRESENT ILLNESS
[FreeTextEntry1] : Patient  with severe autism,hypertension,hyperlipidemia who came for follow up .  patient is non verbal \par patient had DVT to left lower extremity, on Xarelto been followed by hematologist. \par had recent blood  work 3 /2019 , no lipid profile.\par \par  \par his blood pressure is controlled ,\par \par

## 2019-07-01 ENCOUNTER — APPOINTMENT (OUTPATIENT)
Dept: ORTHOPEDIC SURGERY | Facility: CLINIC | Age: 64
End: 2019-07-01
Payer: MEDICARE

## 2019-07-01 PROCEDURE — 73610 X-RAY EXAM OF ANKLE: CPT | Mod: LT

## 2019-07-01 PROCEDURE — 99213 OFFICE O/P EST LOW 20 MIN: CPT

## 2019-07-01 NOTE — ADDENDUM
[FreeTextEntry1] : I, King Belkis, acted solely as a scribe for Dr. Aly Smith on this date 07/01/2019 .\par All medical record entries made by the Scribe were at my, Dr. Aly Smith, direction and personally dictated by me on 07/01/2019 . I have reviewed the chart and agree that the record accurately reflects my personal performance of the history, physical exam, assessment and plan. I have also personally directed, reviewed, and agreed with the chart.\par \par \par

## 2019-07-01 NOTE — HISTORY OF PRESENT ILLNESS
[FreeTextEntry1] : 64 year year old male presenting with left ankle pain. The patient’s pain is noted to be a 4/10. The patient describes their pain and swelling as the same compared to their last visit.  The patient has not been attending physical therapy. The patient presents with an ASO ankle brace. The patient is accompanied by two aids. He is currently taking no pain medication. No other complaints at this time.\par \par \par

## 2019-07-01 NOTE — PHYSICAL EXAM
[de-identified] : General: Alert and oriented x3. In no acute distress. Pleasant in nature with a normal affect. No apparent respiratory distress.\par \par L Ankle Exam \par Skin: Clean, dry, intact\par Inspection: No obvious malalignment, no swelling, no effusion; no lymphadenopathy\par Pulses: 2+ DP/PT pulses\par ROM: Left: -10 degrees of dorsiflexion, 30 degrees of plantarflexion, 10 degrees of subtalar motion\par Tenderness: No Calf pain. No tenderness over the lateral malleolus, no CFL/ATFL/PTFL pain. No medial malleolus pain, no deltoid ligament pain. No proximal fibular pain. No heel pain.\par Stability: Unable to assess.\par Strength:Unable to assess. \par Neuro: In tact to light touch throughout\par Additional tests: Negative Mortons test, Negative syndesmosis squeeze test.\par \par \par \par \par   [de-identified] : 3V of the left ankle were ordered obtained and reviewed by me today, 07/01/2019 , revealed: post-traumatic arthritis, breakdown of the joint, joint space narrowing.\par \par \par

## 2019-07-01 NOTE — DISCUSSION/SUMMARY
[de-identified] : Today I had a lengthy discussion with the patient regarding their left ankle pain.I have addressed all the patient's concerns surrounding the pathology of their condition. There was a discussion about a possible future surgical treatment, in which the patient would have to be off his foot for 2-3 month post operatively. A discussion would have to be had with the patient's brother before further discussion regarding surgery.  I recommended the patient utilize an ASO brace. The patient was provided with the ASO brace in the office today.I would like to see the patient back in the office in 3-4 months PRN to reassess their condition. The patient understood and verbally agreed to the treatment plan. All of their questions were answered and they were satisfied with the visit. The patient should call the office if they have any questions or experience worsening symptoms.\par \par \par

## 2019-08-15 ENCOUNTER — APPOINTMENT (OUTPATIENT)
Dept: CARDIOLOGY | Facility: CLINIC | Age: 64
End: 2019-08-15
Payer: MEDICARE

## 2019-08-15 ENCOUNTER — NON-APPOINTMENT (OUTPATIENT)
Age: 64
End: 2019-08-15

## 2019-08-15 VITALS
BODY MASS INDEX: 27.58 KG/M2 | HEIGHT: 68 IN | RESPIRATION RATE: 18 BRPM | OXYGEN SATURATION: 99 % | HEART RATE: 51 BPM | DIASTOLIC BLOOD PRESSURE: 72 MMHG | TEMPERATURE: 97.8 F | SYSTOLIC BLOOD PRESSURE: 118 MMHG | WEIGHT: 182 LBS

## 2019-08-15 PROCEDURE — 99214 OFFICE O/P EST MOD 30 MIN: CPT | Mod: 25

## 2019-08-15 PROCEDURE — 93000 ELECTROCARDIOGRAM COMPLETE: CPT

## 2019-08-15 NOTE — HISTORY OF PRESENT ILLNESS
[FreeTextEntry1] : Patient  with severe autism,hypertension,hyperlipidemia who came for follow up .  patient is non verbal \par patient had DVT to left lower extremity, on Xarelto been followed by hematologist who was noted to  be bradycardia noticed while on routine BP check , lowest rate was 50 -57 per minute , patient was sent to urgent care , where he had sinus bradycardia 51 . His heart rate  varying  between 50 to 57 over few days ,  patient did not look weak or tired , or shortness of breath as per caregiver  ,caregiver says his heart is normal now , \par \par \par had recent blood  work 7 /2019 , no lipid profile.\par \par  \par his blood pressure is controlled ,\par \par

## 2019-10-02 ENCOUNTER — APPOINTMENT (OUTPATIENT)
Dept: ORTHOPEDIC SURGERY | Facility: CLINIC | Age: 64
End: 2019-10-02

## 2019-10-21 ENCOUNTER — APPOINTMENT (OUTPATIENT)
Dept: ORTHOPEDIC SURGERY | Facility: CLINIC | Age: 64
End: 2019-10-21
Payer: MEDICARE

## 2019-10-21 PROCEDURE — 73610 X-RAY EXAM OF ANKLE: CPT | Mod: LT

## 2019-10-21 PROCEDURE — 99213 OFFICE O/P EST LOW 20 MIN: CPT

## 2019-10-21 NOTE — HISTORY OF PRESENT ILLNESS
[FreeTextEntry1] : 64 year old male presenting with left ankle pain. The patient’s pain is noted to be a 3/10. The pain and swelling are noted to be the same compared to the previous visit. The patient is accompanied by his two aids.  He is currently taking acetaminophen. No other complaints at this time.\par \par \par

## 2019-10-21 NOTE — PHYSICAL EXAM
[de-identified] : General: Alert and oriented x3. In no acute distress. Pleasant in nature with a normal affect. No apparent respiratory distress.\par \par L Ankle Exam \par Skin: Clean, dry, intact\par Inspection: No obvious malalignment, no swelling, no effusion; no lymphadenopathy\par Pulses: 2+ DP/PT pulses\par ROM: Left: -10 degrees of dorsiflexion, 30 degrees of plantarflexion, 10 degrees of subtalar motion\par Tenderness: No Calf pain. No tenderness over the lateral malleolus, no CFL/ATFL/PTFL pain. No medial malleolus pain, no deltoid ligament pain. No proximal fibular pain. No heel pain.\par Stability: Unable to assess.\par Strength:Unable to assess. \par Neuro: In tact to light touch throughout\par Additional tests: Negative Mortons test, Negative syndesmosis squeeze test.  [de-identified] : 3V of the left ankle were ordered obtained and reviewed by me today, 10/21/2019 , revealed: erosive changes to ankle joint, no sign of septic infection\par \par \par

## 2019-10-21 NOTE — ADDENDUM
[FreeTextEntry1] : I, King Belkis, acted solely as a scribe for Dr. Aly Smith on this date 10/21/2019 .\par All medical record entries made by the Scribe were at my, Dr. Aly Smith, direction and personally dictated by me on 10/21/2019 . I have reviewed the chart and agree that the record accurately reflects my personal performance of the history, physical exam, assessment and plan. I have also personally directed, reviewed, and agreed with the chart.\par \par \par

## 2019-10-21 NOTE — DISCUSSION/SUMMARY
[de-identified] : Today I had a lengthy discussion with the patient regarding their left ankle pain.I have addressed all the patient's concerns surrounding the pathology of their condition. I would like to see the patient back in the office PRN to reassess their condition. The patient understood and verbally agreed to the treatment plan. All of their questions were answered and they were satisfied with the visit. The patient should call the office if they have any questions or experience worsening symptoms.\par \par \par

## 2019-12-03 ENCOUNTER — APPOINTMENT (OUTPATIENT)
Dept: CARDIOLOGY | Facility: CLINIC | Age: 64
End: 2019-12-03
Payer: MEDICARE

## 2019-12-03 ENCOUNTER — NON-APPOINTMENT (OUTPATIENT)
Age: 64
End: 2019-12-03

## 2019-12-03 VITALS
OXYGEN SATURATION: 99 % | BODY MASS INDEX: 29.25 KG/M2 | HEART RATE: 67 BPM | WEIGHT: 193 LBS | SYSTOLIC BLOOD PRESSURE: 125 MMHG | HEIGHT: 68 IN | DIASTOLIC BLOOD PRESSURE: 76 MMHG

## 2019-12-03 DIAGNOSIS — R00.1 BRADYCARDIA, UNSPECIFIED: ICD-10-CM

## 2019-12-03 PROCEDURE — 99214 OFFICE O/P EST MOD 30 MIN: CPT

## 2019-12-03 PROCEDURE — 93000 ELECTROCARDIOGRAM COMPLETE: CPT

## 2019-12-03 NOTE — HISTORY OF PRESENT ILLNESS
[FreeTextEntry1] : Patient  with severe autism, hypertension, hyperlipidemia, DVT to left lower extremity, on Xarelto been followed by hematologist, H/O asymptomatic bradycardia ( today SR 70 )  who presents today in routine cardiac F/U accompanied with formal caregiver who offers no complaints or concerns.  patient is non verbal \par \par Labs with PCP and hematologist \par \par \par

## 2019-12-03 NOTE — DISCUSSION/SUMMARY
[Hyperlipidemia] : hyperlipidemia [Diet Modification] : diet modification [Exercise] : exercise [Hypertension] : hypertension [Exercise Regimen] : an exercise regimen [Weight Loss] : weight loss [Sodium Restriction] : sodium restriction [Mild Mitral Regurgitation] : mild mitral regurgitation [Stable] : stable [None] : none [FreeTextEntry1] : \par MR: Repeat F/U echo ordered \par \par DVT: Maintain oral AC\par \par Asymptomatic bradycardia ? physiologic \par \par OV 6 months

## 2020-02-13 ENCOUNTER — APPOINTMENT (OUTPATIENT)
Dept: CARDIOLOGY | Facility: CLINIC | Age: 65
End: 2020-02-13

## 2020-03-03 ENCOUNTER — INPATIENT (INPATIENT)
Facility: HOSPITAL | Age: 65
LOS: 2 days | Discharge: HOME | DRG: 381 | End: 2020-03-06
Attending: HOSPITALIST | Admitting: HOSPITALIST
Payer: MEDICARE

## 2020-03-03 ENCOUNTER — TRANSCRIPTION ENCOUNTER (OUTPATIENT)
Age: 65
End: 2020-03-03

## 2020-03-03 VITALS
RESPIRATION RATE: 20 BRPM | DIASTOLIC BLOOD PRESSURE: 74 MMHG | OXYGEN SATURATION: 94 % | SYSTOLIC BLOOD PRESSURE: 126 MMHG | HEART RATE: 142 BPM | TEMPERATURE: 96 F | WEIGHT: 188.94 LBS

## 2020-03-03 DIAGNOSIS — K92.2 GASTROINTESTINAL HEMORRHAGE, UNSPECIFIED: ICD-10-CM

## 2020-03-03 DIAGNOSIS — S82.892A OTHER FRACTURE OF LEFT LOWER LEG, INITIAL ENCOUNTER FOR CLOSED FRACTURE: Chronic | ICD-10-CM

## 2020-03-03 LAB
ALBUMIN SERPL ELPH-MCNC: 3.3 G/DL — SIGNIFICANT CHANGE UP (ref 3.3–5)
ALP SERPL-CCNC: 94 U/L — SIGNIFICANT CHANGE UP (ref 30–120)
ALT FLD-CCNC: 21 U/L DA — SIGNIFICANT CHANGE UP (ref 10–60)
ANION GAP SERPL CALC-SCNC: 17 MMOL/L — SIGNIFICANT CHANGE UP (ref 5–17)
APTT BLD: 41.3 SEC — HIGH (ref 28.5–37)
AST SERPL-CCNC: 20 U/L — SIGNIFICANT CHANGE UP (ref 10–40)
BASOPHILS # BLD AUTO: 0.02 K/UL — SIGNIFICANT CHANGE UP (ref 0–0.2)
BASOPHILS NFR BLD AUTO: 0.1 % — SIGNIFICANT CHANGE UP (ref 0–2)
BILIRUB SERPL-MCNC: 0.2 MG/DL — SIGNIFICANT CHANGE UP (ref 0.2–1.2)
BLD GP AB SCN SERPL QL: SIGNIFICANT CHANGE UP
BUN SERPL-MCNC: 42 MG/DL — HIGH (ref 7–23)
CALCIUM SERPL-MCNC: 9.6 MG/DL — SIGNIFICANT CHANGE UP (ref 8.4–10.5)
CHLORIDE SERPL-SCNC: 102 MMOL/L — SIGNIFICANT CHANGE UP (ref 96–108)
CO2 SERPL-SCNC: 17 MMOL/L — LOW (ref 22–31)
CREAT SERPL-MCNC: 1.13 MG/DL — SIGNIFICANT CHANGE UP (ref 0.5–1.3)
EOSINOPHIL # BLD AUTO: 0 K/UL — SIGNIFICANT CHANGE UP (ref 0–0.5)
EOSINOPHIL NFR BLD AUTO: 0 % — SIGNIFICANT CHANGE UP (ref 0–6)
GLUCOSE SERPL-MCNC: 201 MG/DL — HIGH (ref 70–99)
HCT VFR BLD CALC: 34.5 % — LOW (ref 39–50)
HCT VFR BLD CALC: 37.5 % — LOW (ref 39–50)
HGB BLD-MCNC: 11.6 G/DL — LOW (ref 13–17)
HGB BLD-MCNC: 12.7 G/DL — LOW (ref 13–17)
IMM GRANULOCYTES NFR BLD AUTO: 0.5 % — SIGNIFICANT CHANGE UP (ref 0–1.5)
INR BLD: 1.69 RATIO — HIGH (ref 0.88–1.16)
LYMPHOCYTES # BLD AUTO: 0.54 K/UL — LOW (ref 1–3.3)
LYMPHOCYTES # BLD AUTO: 2.7 % — LOW (ref 13–44)
MCHC RBC-ENTMCNC: 30.4 PG — SIGNIFICANT CHANGE UP (ref 27–34)
MCHC RBC-ENTMCNC: 30.8 PG — SIGNIFICANT CHANGE UP (ref 27–34)
MCHC RBC-ENTMCNC: 33.6 GM/DL — SIGNIFICANT CHANGE UP (ref 32–36)
MCHC RBC-ENTMCNC: 33.9 GM/DL — SIGNIFICANT CHANGE UP (ref 32–36)
MCV RBC AUTO: 90.6 FL — SIGNIFICANT CHANGE UP (ref 80–100)
MCV RBC AUTO: 91 FL — SIGNIFICANT CHANGE UP (ref 80–100)
MONOCYTES # BLD AUTO: 1.06 K/UL — HIGH (ref 0–0.9)
MONOCYTES NFR BLD AUTO: 5.3 % — SIGNIFICANT CHANGE UP (ref 2–14)
NEUTROPHILS # BLD AUTO: 18.32 K/UL — HIGH (ref 1.8–7.4)
NEUTROPHILS NFR BLD AUTO: 91.4 % — HIGH (ref 43–77)
NRBC # BLD: 0 /100 WBCS — SIGNIFICANT CHANGE UP (ref 0–0)
NRBC # BLD: 0 /100 WBCS — SIGNIFICANT CHANGE UP (ref 0–0)
NT-PROBNP SERPL-SCNC: 768 PG/ML — HIGH (ref 0–125)
OB PNL STL: POSITIVE
PLATELET # BLD AUTO: 233 K/UL — SIGNIFICANT CHANGE UP (ref 150–400)
PLATELET # BLD AUTO: 249 K/UL — SIGNIFICANT CHANGE UP (ref 150–400)
POTASSIUM SERPL-MCNC: 3.1 MMOL/L — LOW (ref 3.5–5.3)
POTASSIUM SERPL-SCNC: 3.1 MMOL/L — LOW (ref 3.5–5.3)
PROT SERPL-MCNC: 6.9 G/DL — SIGNIFICANT CHANGE UP (ref 6–8.3)
PROTHROM AB SERPL-ACNC: 19.1 SEC — HIGH (ref 10–12.9)
RBC # BLD: 3.81 M/UL — LOW (ref 4.2–5.8)
RBC # BLD: 4.12 M/UL — LOW (ref 4.2–5.8)
RBC # FLD: 11.9 % — SIGNIFICANT CHANGE UP (ref 10.3–14.5)
RBC # FLD: 11.9 % — SIGNIFICANT CHANGE UP (ref 10.3–14.5)
SODIUM SERPL-SCNC: 136 MMOL/L — SIGNIFICANT CHANGE UP (ref 135–145)
TROPONIN I SERPL-MCNC: 0.35 NG/ML — HIGH (ref 0.02–0.06)
WBC # BLD: 20.05 K/UL — HIGH (ref 3.8–10.5)
WBC # BLD: 20.39 K/UL — HIGH (ref 3.8–10.5)
WBC # FLD AUTO: 20.05 K/UL — HIGH (ref 3.8–10.5)
WBC # FLD AUTO: 20.39 K/UL — HIGH (ref 3.8–10.5)

## 2020-03-03 PROCEDURE — 99223 1ST HOSP IP/OBS HIGH 75: CPT | Mod: AI

## 2020-03-03 PROCEDURE — 74177 CT ABD & PELVIS W/CONTRAST: CPT | Mod: 26

## 2020-03-03 PROCEDURE — 71045 X-RAY EXAM CHEST 1 VIEW: CPT | Mod: 26

## 2020-03-03 PROCEDURE — 99285 EMERGENCY DEPT VISIT HI MDM: CPT

## 2020-03-03 PROCEDURE — 93010 ELECTROCARDIOGRAM REPORT: CPT

## 2020-03-03 RX ORDER — NIACIN 50 MG
0 TABLET ORAL
Qty: 0 | Refills: 0 | DISCHARGE

## 2020-03-03 RX ORDER — ONDANSETRON 8 MG/1
4 TABLET, FILM COATED ORAL ONCE
Refills: 0 | Status: COMPLETED | OUTPATIENT
Start: 2020-03-03 | End: 2020-03-03

## 2020-03-03 RX ORDER — CHLORHEXIDINE GLUCONATE 213 G/1000ML
1 SOLUTION TOPICAL
Refills: 0 | Status: DISCONTINUED | OUTPATIENT
Start: 2020-03-03 | End: 2020-03-06

## 2020-03-03 RX ORDER — PANTOPRAZOLE SODIUM 20 MG/1
40 TABLET, DELAYED RELEASE ORAL ONCE
Refills: 0 | Status: COMPLETED | OUTPATIENT
Start: 2020-03-03 | End: 2020-03-03

## 2020-03-03 RX ORDER — SODIUM CHLORIDE 9 MG/ML
1000 INJECTION INTRAMUSCULAR; INTRAVENOUS; SUBCUTANEOUS ONCE
Refills: 0 | Status: COMPLETED | OUTPATIENT
Start: 2020-03-03 | End: 2020-03-03

## 2020-03-03 RX ORDER — PANTOPRAZOLE SODIUM 20 MG/1
8 TABLET, DELAYED RELEASE ORAL
Qty: 80 | Refills: 0 | Status: DISCONTINUED | OUTPATIENT
Start: 2020-03-03 | End: 2020-03-06

## 2020-03-03 RX ORDER — FLUVOXAMINE MALEATE 25 MG/1
0.5 TABLET ORAL
Qty: 0 | Refills: 0 | DISCHARGE

## 2020-03-03 RX ORDER — POTASSIUM CHLORIDE 20 MEQ
40 PACKET (EA) ORAL ONCE
Refills: 0 | Status: COMPLETED | OUTPATIENT
Start: 2020-03-03 | End: 2020-03-03

## 2020-03-03 RX ORDER — POTASSIUM CHLORIDE 20 MEQ
10 PACKET (EA) ORAL
Refills: 0 | Status: COMPLETED | OUTPATIENT
Start: 2020-03-03 | End: 2020-03-04

## 2020-03-03 RX ADMIN — PANTOPRAZOLE SODIUM 40 MILLIGRAM(S): 20 TABLET, DELAYED RELEASE ORAL at 23:20

## 2020-03-03 RX ADMIN — Medication 40 MILLIEQUIVALENT(S): at 22:44

## 2020-03-03 RX ADMIN — SODIUM CHLORIDE 1000 MILLILITER(S): 9 INJECTION INTRAMUSCULAR; INTRAVENOUS; SUBCUTANEOUS at 23:40

## 2020-03-03 RX ADMIN — Medication 100 MILLIEQUIVALENT(S): at 23:30

## 2020-03-03 RX ADMIN — SODIUM CHLORIDE 1000 MILLILITER(S): 9 INJECTION INTRAMUSCULAR; INTRAVENOUS; SUBCUTANEOUS at 23:44

## 2020-03-03 RX ADMIN — SODIUM CHLORIDE 1000 MILLILITER(S): 9 INJECTION INTRAMUSCULAR; INTRAVENOUS; SUBCUTANEOUS at 22:40

## 2020-03-03 RX ADMIN — ONDANSETRON 4 MILLIGRAM(S): 8 TABLET, FILM COATED ORAL at 23:00

## 2020-03-03 NOTE — ED PROVIDER NOTE - PROGRESS NOTE DETAILS
wbc 20 hgb 12.7 ct abdomen pending + trop .348   pt vomited melena while in er fluids  protonix zofran ordered [facility] consulted spoke with CLAY Cochran accepted to [facility] gi called

## 2020-03-03 NOTE — ED PROVIDER NOTE - ATTENDING CONTRIBUTION TO CARE
hx as per PA, 63yo male from group home with sob and abd pain, pt is nonverbal has been admitted for GI bleed, on eliquis, no vomiting or diarrhea  exam:nonverbal, pale conjunctiva, abd soft non tender no rebound or guarding, +black stools  plan: labs, fluids, transfuse, admit  agree with assessment and plan of PA

## 2020-03-03 NOTE — ED PROVIDER NOTE - CONSTITUTIONAL, MLM
normal... Well appearing, awake, alert, oriented to person, not place, time/situation and in no apparent distress. pale skin

## 2020-03-03 NOTE — ED ADULT NURSE NOTE - CHPI ED NUR SYMPTOMS NEG
no chills/no hemoptysis/no chest pain/no body aches/no edema/no wheezing/no cough/no diaphoresis/no fever/no headache

## 2020-03-03 NOTE — CONSULT NOTE ADULT - ASSESSMENT
Patient is a 64 year old male with a pmhx of DVT +left femoral/popliteal on xarelto, MR, autism, non-verbal, bipolar disorder, schizophrenia, gerd, htn, hld, hematuria, bph who presents from group home for shortness of breath, found to have:    1. GI bleed  2. ABLA  3. hypokalemia         Plan:  Neuro:  Cardio:  Pulm:  GI:  Renal:  ID:  Heme:  Endo:      Dispo: Admit to [facility]. Case d/w eICU Patient is a 64 year old male with a pmhx of DVT +left femoral/popliteal on xarelto, MR, autism, non-verbal, bipolar disorder, schizophrenia, gerd, htn, hld, hematuria, bph who presents from group home for shortness of breath, found to have:    1. GI bleed  2. ABLA  3. hypokalemia         Plan:  Neuro: Holding home psych meds for now. restart when able   Cardio: Hd stable at this time. BP on my exam 120/82, tachycardic to 130s (sinus)  Pulm: Currently protecting airway, maintain sats above 92%, strict aspiration precautions   GI: upper GI bleed, possibly from known prior gastric and duodenal ulcers s/p EGD ~1 month ago. NPO. Will place NGT. Initiate protonix infusion. CT abd showing dilated loops of small bowel with transition, distended colon and stomach with fluid and debris. GI consult   Renal: Strict I/Os, renally dose meds, replace potassium IV  ID: No signs of infection, send blood cultures, send lactate   Heme: hold Xarelto and all antiplatelet meds. Presenting Hg 12.7. will get repeat as patient has had multiple blood loss episodes since.  Endo: No active issues       Dispo: Admit to [facility]. Case d/w eICU Patient is a 64 year old male with a pmhx of DVT +left femoral/popliteal on xarelto, MR, autism, non-verbal, bipolar disorder, schizophrenia, gerd, htn, hld, hematuria, bph who presents from group home for shortness of breath, found to have:    1. GI bleed  2. ABLA  3. hypokalemia   4. UZAIR        Plan:  Neuro: Holding home psych meds for now. restart when able   Cardio: Hd stable at this time. BP on my exam 120/82, tachycardic to 130s (sinus)  Pulm: Currently protecting airway, maintain sats above 92%, strict aspiration precautions   GI: upper GI bleed, possibly from known prior gastric and duodenal ulcers s/p EGD ~1 month ago. NPO. Will place NGT. Initiate protonix infusion. CT abd showing dilated loops of small bowel with transition, distended colon and stomach with fluid and debris. GI consult   Renal: Strict I/Os, renally dose meds, replace potassium IV  Renal: UZAIR, Bun 42 elevated from baseline, consistent with upper GI bleed and hypovolemia   ID: No signs of infection, send blood cultures, send lactate   Heme: hold Xarelto and all antiplatelet meds. Presenting Hg 12.7. will get repeat as patient has had multiple blood loss episodes since.  Endo: No active issues       Dispo: Admit to [facility]. Case d/w eICU Patient is a 64 year old male with a pmhx of DVT +left femoral/popliteal on xarelto, MR, autism, non-verbal, bipolar disorder, schizophrenia, gerd, htn, hld, hematuria, bph who presents from group home for shortness of breath, found to have:    1. GI bleed  2. ABLA  3. hypokalemia   4. UZAIR        Plan:  Neuro: Holding home psych meds for now. restart when able   Cardio: Hd stable at this time. BP on my exam 120/82, tachycardic to 130s (sinus)  Pulm: Currently protecting airway, maintain sats above 92%, strict aspiration precautions   GI: upper GI bleed, possibly from known prior gastric and duodenal ulcers s/p EGD ~1 month ago. NPO. Will place NGT. Initiate protonix infusion. CT abd showing dilated loops of small bowel with transition, distended colon and stomach with fluid and debris. GI consult   Renal: Strict I/Os, renally dose meds, replace potassium IV  Renal: UZAIR, Bun 42 elevated from baseline, consistent with upper GI bleed and hypovolemia   ID: No signs of infection, send blood cultures, send lactate   Heme: hold Xarelto and all antiplatelet meds. Presenting Hg 12.7. will get repeat as patient has had multiple blood loss episodes since. known left femoral and popliteal DVT  Endo: No active issues       Dispo: Admit to [facility]. Case d/w eICU Patient is a 64 year old male with a pmhx of DVT +left femoral/popliteal on xarelto, MR, autism, non-verbal, bipolar disorder, schizophrenia, gerd, htn, hld, hematuria, bph who presents from group home for shortness of breath, found to have:    1. GI bleed  2. ABLA  3. hypokalemia   4. UZAIR        Plan:  Neuro: Holding home psych meds for now. restart when able   Cardio: Hd stable at this time. BP on my exam 120/82, tachycardic to 130s (sinus)  Pulm: Currently protecting airway, maintain sats above 92%, strict aspiration precautions   GI: upper GI bleed, possibly from known prior gastric and duodenal ulcers s/p EGD ~1 year ago. NPO. Will place NGT. Initiate protonix infusion. CT abd showing dilated loops of small bowel with transition, distended colon and stomach with fluid and debris. GI consult   Renal: Strict I/Os, renally dose meds, replace potassium IV  Renal: UZAIR, Bun 42 elevated from baseline, consistent with upper GI bleed and hypovolemia   ID: No signs of infection, send blood cultures, send lactate   Heme: hold Xarelto and all antiplatelet meds. Presenting Hg 12.7. will get repeat as patient has had multiple blood loss episodes since. known left femoral and popliteal DVT  Endo: No active issues       Dispo: Admit to [facility]. Case d/w eICU Patient is a 64 year old male with a pmhx of DVT +left femoral/popliteal on xarelto, MR, autism, non-verbal, bipolar disorder, schizophrenia, gerd, htn, hld, hematuria, bph who presents from group home for shortness of breath, found to have:    1. GI bleed  2. ABLA  3. hypokalemia   4. UZAIR        Plan:  Neuro: Holding home psych meds for now. restart when able   Cardio: Hd stable at this time. BP on my exam 120/82, tachycardic to 130s (sinus)  Pulm: Currently protecting airway, maintain sats above 92%, strict aspiration precautions   GI: upper GI bleed, possibly from known prior gastric and duodenal ulcers s/p EGD ~1 year ago. NPO. Consider NGT. Initiate protonix infusion. CT abd showing dilated loops of small bowel with transition, distended colon and stomach with fluid and debris. GI consult   Renal: Strict I/Os, renally dose meds, replace potassium IV  Renal: UZAIR, Bun 42 elevated from baseline, consistent with upper GI bleed and hypovolemia   ID: No signs of infection, send blood cultures, send lactate   Heme: hold Xarelto and all antiplatelet meds. Presenting Hg 12.7. will get repeat as patient has had multiple blood loss episodes since. known left femoral and popliteal DVT  Endo: No active issues       Dispo: Admit to [facility]. Case d/w eICU Patient is a 64 year old male with a pmhx of DVT +left femoral/popliteal on xarelto, MR, autism, non-verbal, bipolar disorder, schizophrenia, gerd, htn, hld, hematuria, bph who presents from group home for shortness of breath, found to have:    1. GI bleed noted to have melana and coffee ground emesis   2. ABLA  3. hypokalemia   4. UZAIR        Plan:  Neuro: Holding home psych meds for now. restart when able   Cardio: Hd stable at this time. BP on my exam 120/82, tachycardic to 130s (sinus)  Pulm: Currently protecting airway, maintain sats above 92%, strict aspiration precautions   GI: upper GI bleed, possibly from known prior gastric and duodenal ulcers s/p EGD ~1 year ago. NPO. Consider NGT. Initiate protonix infusion. CT abd showing dilated loops of small bowel with transition, distended colon and stomach with fluid and debris. GI consult   Renal: Strict I/Os, renally dose meds, replace potassium IV  Renal: UZAIR, Bun 42 elevated from baseline, consistent with upper GI bleed and hypovolemia   ID: No signs of infection, send blood cultures, send lactate   Heme: hold Xarelto and all antiplatelet meds. Presenting Hg 12.7. will get repeat as patient has had multiple blood loss episodes since. known left femoral and popliteal DVT  Endo: No active issues       Dispo: Admit to [facility]. Case d/w eICU

## 2020-03-03 NOTE — ED ADULT NURSE NOTE - OBJECTIVE STATEMENT
Patient presents from [facility] with staff for increased Shortness of breath and abdominal distention. Patient non verbal, follows commands, shakes head yes and no. Denies pain, no n/v/d. Dark stools. Patient pale. Prior HX of anemia. Placed on cardiac monitor, sinus tach.

## 2020-03-03 NOTE — CONSULT NOTE ADULT - SUBJECTIVE AND OBJECTIVE BOX
Patient is a 64y old  Male who presents with a chief complaint of Gi bleed     BRIEF HOSPITAL COURSE:   Patient is a 64 year old male with a pmhx of DVT +left femoral/popliteal on xarelto, MR, autism, non-verbal, bipolar disorder, schizophrenia, gerd, htn, hld, hematuria, bph who presents from group home for shortness of breath. Per aide at bedside, they noticed patient was having some mild shortness of breath along with an enlarged stomach. Nursing home staff then took vitals which revealed HR elevated to 130s and was then sent to [facility] ED. Upon presentation, patient noted to have WBC 20K, Hg 12.7, tachycardia to 130s, /82. Upon workup, patient noted to have several episodes of hematemesis described as coffee ground in color. Also now having maroon colored bowel movements with minimal brighter clots mixed in. Sent for CT angio. Was fecal occult positive. Of note patient here in late January for GI bleed, EGD done at that time significant for gastric and duodenal ulcers. Patient started back on xarelto and discharged back to group home. Patient seen and examined at beside is nonverbal.        PAST MEDICAL & SURGICAL HISTORY:  Mental retardation  Aggressiveness  Agitation  Nonverbal  Autism  Bipolar affective disorder  Schizophrenia, paranoid type  GERD (gastroesophageal reflux disease)  Hypertension  Hyperlipidemia  Hematuria  BPH (benign prostatic hypertrophy) with urinary obstruction  Fracture of left malleolus: repaired in 7/2018    Allergies    No Known Allergies    Intolerances      FAMILY HISTORY:  No pertinent family history in first degree relatives  Family history unknown    Social:  MRDD  nonverbal     Review of Systems:  nonverbal       Medications:  pantoprazole  Injectable 40 milliGRAM(s) IV Push Once  potassium chloride  10 mEq/100 mL IVPB 10 milliEquivalent(s) IV Intermittent every 1 hour  sodium chloride 0.9% Bolus 1000 milliLiter(s) IV Bolus once  chlorhexidine 2% Cloths 1 Application(s) Topical <User Schedule>        ICU Vital Signs Last 24 Hrs  T(C): 36.4 (03 Mar 2020 22:45), Max: 36.4 (03 Mar 2020 22:45)  T(F): 97.5 (03 Mar 2020 22:45), Max: 97.5 (03 Mar 2020 22:45)  HR: 120 (03 Mar 2020 23:10) (114 - 142)  BP: 123/72 (03 Mar 2020 23:10) (117/76 - 126/74)  RR: 20 (03 Mar 2020 22:45) (20 - 20)  SpO2: 94% (03 Mar 2020 20:32) (94% - 94%)      I&O's Detail        LABS:                        12.7   20.05 )-----------( 249      ( 03 Mar 2020 21:11 )             37.5     03-03    136  |  102  |  42<H>  ----------------------------<  201<H>  3.1<L>   |  17<L>  |  1.13    Ca    9.6      03 Mar 2020 21:11    TPro  6.9  /  Alb  3.3  /  TBili  0.2  /  DBili  x   /  AST  20  /  ALT  21  /  AlkPhos  94  03-03      CARDIAC MARKERS ( 03 Mar 2020 21:11 )  .346 ng/mL / x     / x     / x     / x          CAPILLARY BLOOD GLUCOSE        PT/INR - ( 03 Mar 2020 21:11 )   PT: 19.1 sec;   INR: 1.69 ratio         PTT - ( 03 Mar 2020 21:11 )  PTT:41.3 sec    CULTURES:      Physical Examination:    General: No acute distress. dried blood noted on mouth and on lower groin area     HEENT: Pupils equal, reactive to light.  Symmetric.    PULM: Clear to auscultation bilaterally, no significant sputum production    CVS: Tachy, no murmurs, rubs, or gallops    ABD: Soft, moderately distended, nontender,  no masses    EXT: No edema, nontender    SKIN: Warm and well perfused    Neuro: unable to follow commands, nonverbal, face is symmetric       RADIOLOGY:   CT angio pending Patient is a 64y old  Male who presents with a chief complaint of Gi bleed     BRIEF HOSPITAL COURSE:   Patient is a 64 year old male with a pmhx of DVT +left femoral/popliteal on xarelto, MR, autism, non-verbal, bipolar disorder, schizophrenia, gerd, htn, hld, hematuria, bph who presents from group home for shortness of breath. Per aide at bedside, they noticed patient was having some mild shortness of breath along with an enlarged stomach. Nursing home staff then took vitals which revealed HR elevated to 130s and was then sent to [facility] ED. Upon presentation, patient noted to have WBC 20K, Hg 12.7, tachycardia to 130s, /82. Upon workup, patient noted to have several episodes of hematemesis described as coffee ground in color. Also now having maroon colored bowel movements with minimal brighter clots mixed in. Sent for CT angio. Was fecal occult positive. Of note patient here in late January for GI bleed, EGD done at that time significant for gastric and duodenal ulcers. Patient started back on xarelto and discharged back to group home. Patient seen and examined at beside is nonverbal.        PAST MEDICAL & SURGICAL HISTORY:  Mental retardation  Aggressiveness  Agitation  Nonverbal  Autism  Bipolar affective disorder  Schizophrenia, paranoid type  GERD (gastroesophageal reflux disease)  Hypertension  Hyperlipidemia  Hematuria  BPH (benign prostatic hypertrophy) with urinary obstruction  Fracture of left malleolus: repaired in 7/2018    Allergies    No Known Allergies    Intolerances      FAMILY HISTORY:  No pertinent family history in first degree relatives  Family history unknown    Social:  MRDD  nonverbal     Review of Systems:  nonverbal       Medications:  pantoprazole  Injectable 40 milliGRAM(s) IV Push Once  potassium chloride  10 mEq/100 mL IVPB 10 milliEquivalent(s) IV Intermittent every 1 hour  sodium chloride 0.9% Bolus 1000 milliLiter(s) IV Bolus once  chlorhexidine 2% Cloths 1 Application(s) Topical <User Schedule>        ICU Vital Signs Last 24 Hrs  T(C): 36.4 (03 Mar 2020 22:45), Max: 36.4 (03 Mar 2020 22:45)  T(F): 97.5 (03 Mar 2020 22:45), Max: 97.5 (03 Mar 2020 22:45)  HR: 120 (03 Mar 2020 23:10) (114 - 142)  BP: 123/72 (03 Mar 2020 23:10) (117/76 - 126/74)  RR: 20 (03 Mar 2020 22:45) (20 - 20)  SpO2: 94% (03 Mar 2020 20:32) (94% - 94%)      I&O's Detail        LABS:                        12.7   20.05 )-----------( 249      ( 03 Mar 2020 21:11 )             37.5     03-03    136  |  102  |  42<H>  ----------------------------<  201<H>  3.1<L>   |  17<L>  |  1.13    Ca    9.6      03 Mar 2020 21:11    TPro  6.9  /  Alb  3.3  /  TBili  0.2  /  DBili  x   /  AST  20  /  ALT  21  /  AlkPhos  94  03-03      CARDIAC MARKERS ( 03 Mar 2020 21:11 )  .346 ng/mL / x     / x     / x     / x          CAPILLARY BLOOD GLUCOSE        PT/INR - ( 03 Mar 2020 21:11 )   PT: 19.1 sec;   INR: 1.69 ratio         PTT - ( 03 Mar 2020 21:11 )  PTT:41.3 sec    CULTURES:      Physical Examination:    General: No acute distress. dried blood noted on mouth and on lower groin area     HEENT: Pupils equal, reactive to light.  Symmetric.    PULM: Clear to auscultation bilaterally, no significant sputum production    CVS: Tachy, no murmurs, rubs, or gallops    ABD: Soft, moderately distended, nontender,  no masses    EXT: No edema, nontender    SKIN: Warm and well perfused    Neuro: unable to follow commands, nonverbal, face is symmetric       RADIOLOGY:   < from: CT Abdomen and Pelvis w/ IV Cont (03.03.20 @ 22:30) >  INTERPRETATION:  CLINICAL INFORMATION: Abdominal pain with dark stools. Leukocytosis.    COMPARISON: CT abdomen/pelvis of 1/25/2019.    PROCEDURE:  CT of the Abdomen and Pelvis was performed with intravenous contrast.   Intravenous contrast: 92 ml Omnipaque 350. 8 ml discarded.  Oral contrast: None.  Sagittal and coronal reformats were performed.    FINDINGS:    LOWER CHEST: Within normal limits.    LIVER: Within normal limits.  BILE DUCTS: Normal caliber.  GALLBLADDER: Within normal limits.  SPLEEN: Within normal limits.  PANCREAS: Within normal limits.  ADRENALS: Within normal limits.  KIDNEYS/URETERS: Within normal limits.    BLADDER: Within normal limits.  REPRODUCTIVE ORGANS: Prostate within normal limits.    BOWEL: Couple nonspecific mildly dilated loops of fluid-filled small bowel within the left upper and right lower quadrants without discrete transition. Colon is air and fluiddistended. Moderate hiatal hernia. Stomach and adrenal hernia are distended with fluid and debris. Appendix is normal.  PERITONEUM: Trace ascites within the right lower quadrant.  VESSELS: Within normal limits.  RETROPERITONEUM/LYMPH NODES: No lymphadenopathy.    ABDOMINAL WALL: Within normal limits.  BONES: Multilevel degenerative changes of the spine.    IMPRESSION:     Few mildly dilated loops of small bowel without discrete transition. Colon is fluid and air distended. Stomach is distended with fluid and debris.    Trace ascites.    < end of copied text > Patient is a 64y old  Male who presents with a chief complaint of Gi bleed     BRIEF HOSPITAL COURSE:   Patient is a 64 year old male with a pmhx of DVT +left femoral/popliteal on xarelto, MR, autism, non-verbal, bipolar disorder, schizophrenia, gerd, htn, hld, hematuria, bph who presents from group home for shortness of breath. Per aide at bedside, they noticed patient was having some mild shortness of breath along with an enlarged stomach. Nursing home staff then took vitals which revealed HR elevated to 130s and was then sent to [facility] ED. Upon presentation, patient noted to have WBC 20K, Hg 12.7, tachycardia to 130s, /82. Upon workup, patient noted to have several episodes of hematemesis described as coffee ground in color. Also now having maroon colored bowel movements with minimal brighter clots mixed in. Sent for CT angio. Was fecal occult positive. Of note patient here in late January for GI bleed, EGD done at that time significant for gastric and duodenal ulcers. Patient started back on xarelto and discharged back to group home. Patient seen and examined at beside is nonverbal.        PAST MEDICAL & SURGICAL HISTORY:  Mental retardation  Aggressiveness  Agitation  Nonverbal  Autism  Bipolar affective disorder  Schizophrenia, paranoid type  GERD (gastroesophageal reflux disease)  Hypertension  Hyperlipidemia  Hematuria  BPH (benign prostatic hypertrophy) with urinary obstruction  Fracture of left malleolus: repaired in 7/2018    Allergies    No Known Allergies    Intolerances      FAMILY HISTORY:  No pertinent family history in first degree relatives  Family history unknown    Social:  MRDD  nonverbal     Review of Systems:  nonverbal       Medications:  pantoprazole  Injectable 40 milliGRAM(s) IV Push Once  potassium chloride  10 mEq/100 mL IVPB 10 milliEquivalent(s) IV Intermittent every 1 hour  sodium chloride 0.9% Bolus 1000 milliLiter(s) IV Bolus once  chlorhexidine 2% Cloths 1 Application(s) Topical <User Schedule>        ICU Vital Signs Last 24 Hrs  T(C): 36.4 (03 Mar 2020 22:45), Max: 36.4 (03 Mar 2020 22:45)  T(F): 97.5 (03 Mar 2020 22:45), Max: 97.5 (03 Mar 2020 22:45)  HR: 120 (03 Mar 2020 23:10) (114 - 142)  BP: 123/72 (03 Mar 2020 23:10) (117/76 - 126/74)  RR: 20 (03 Mar 2020 22:45) (20 - 20)  SpO2: 94% (03 Mar 2020 20:32) (94% - 94%)      I&O's Detail        LABS:                        12.7   20.05 )-----------( 249      ( 03 Mar 2020 21:11 )             37.5     03-03    136  |  102  |  42<H>  ----------------------------<  201<H>  3.1<L>   |  17<L>  |  1.13    Ca    9.6      03 Mar 2020 21:11    TPro  6.9  /  Alb  3.3  /  TBili  0.2  /  DBili  x   /  AST  20  /  ALT  21  /  AlkPhos  94  03-03      CARDIAC MARKERS ( 03 Mar 2020 21:11 )  .346 ng/mL / x     / x     / x     / x          CAPILLARY BLOOD GLUCOSE        PT/INR - ( 03 Mar 2020 21:11 )   PT: 19.1 sec;   INR: 1.69 ratio         PTT - ( 03 Mar 2020 21:11 )  PTT:41.3 sec    CULTURES:      Physical Examination:    General: No acute distress. dried blood noted on mouth and on lower groin area, pale, mucous membranes dry     HEENT: Pupils equal, reactive to light.  Symmetric.    PULM: Clear to auscultation bilaterally, no significant sputum production    CVS: Tachy, no murmurs, rubs, or gallops    ABD: Soft, moderately distended, nontender,  no masses    EXT: No edema, nontender    SKIN: Warm and well perfused    Neuro: unable to follow commands, nonverbal, face is symmetric       RADIOLOGY:   < from: CT Abdomen and Pelvis w/ IV Cont (03.03.20 @ 22:30) >  INTERPRETATION:  CLINICAL INFORMATION: Abdominal pain with dark stools. Leukocytosis.    COMPARISON: CT abdomen/pelvis of 1/25/2019.    PROCEDURE:  CT of the Abdomen and Pelvis was performed with intravenous contrast.   Intravenous contrast: 92 ml Omnipaque 350. 8 ml discarded.  Oral contrast: None.  Sagittal and coronal reformats were performed.    FINDINGS:    LOWER CHEST: Within normal limits.    LIVER: Within normal limits.  BILE DUCTS: Normal caliber.  GALLBLADDER: Within normal limits.  SPLEEN: Within normal limits.  PANCREAS: Within normal limits.  ADRENALS: Within normal limits.  KIDNEYS/URETERS: Within normal limits.    BLADDER: Within normal limits.  REPRODUCTIVE ORGANS: Prostate within normal limits.    BOWEL: Couple nonspecific mildly dilated loops of fluid-filled small bowel within the left upper and right lower quadrants without discrete transition. Colon is air and fluiddistended. Moderate hiatal hernia. Stomach and adrenal hernia are distended with fluid and debris. Appendix is normal.  PERITONEUM: Trace ascites within the right lower quadrant.  VESSELS: Within normal limits.  RETROPERITONEUM/LYMPH NODES: No lymphadenopathy.    ABDOMINAL WALL: Within normal limits.  BONES: Multilevel degenerative changes of the spine.    IMPRESSION:     Few mildly dilated loops of small bowel without discrete transition. Colon is fluid and air distended. Stomach is distended with fluid and debris.    Trace ascites.    < end of copied text > Patient is a 64y old  Male who presents with a chief complaint of Gi bleed     BRIEF HOSPITAL COURSE:   Patient is a 64 year old male with a pmhx of DVT +left femoral/popliteal on xarelto, MR, autism, non-verbal, bipolar disorder, schizophrenia, gerd, htn, hld, hematuria, bph who presents from group home for shortness of breath. Per aide at bedside, they noticed patient was having some mild shortness of breath along with an enlarged stomach. Nursing home staff then took vitals which revealed HR elevated to 130s and was then sent to [facility] ED. Upon presentation, patient noted to have WBC 20K, Hg 12.7, tachycardia to 130s, /82. Upon workup, patient noted to have several episodes of hematemesis described as coffee ground in color. Also now having maroon colored bowel movements with minimal brighter clots mixed in. Sent for CT angio. Was fecal occult positive. Of note patient admitted to ICU in late January for ABLA/GI bleed, EGD done at that time significant for gastric and duodenal ulcers. Patient started back on xarelto and discharged back to group home. Patient seen and examined at beside is nonverbal.        PAST MEDICAL & SURGICAL HISTORY:  Mental retardation  Aggressiveness  Agitation  Nonverbal  Autism  Bipolar affective disorder  Schizophrenia, paranoid type  GERD (gastroesophageal reflux disease)  Hypertension  Hyperlipidemia  Hematuria  BPH (benign prostatic hypertrophy) with urinary obstruction  Fracture of left malleolus: repaired in 7/2018    Allergies    No Known Allergies    Intolerances      FAMILY HISTORY:  No pertinent family history in first degree relatives  Family history unknown    Social:  MRDD  nonverbal     Review of Systems:  nonverbal       Medications:  pantoprazole  Injectable 40 milliGRAM(s) IV Push Once  potassium chloride  10 mEq/100 mL IVPB 10 milliEquivalent(s) IV Intermittent every 1 hour  sodium chloride 0.9% Bolus 1000 milliLiter(s) IV Bolus once  chlorhexidine 2% Cloths 1 Application(s) Topical <User Schedule>        ICU Vital Signs Last 24 Hrs  T(C): 36.4 (03 Mar 2020 22:45), Max: 36.4 (03 Mar 2020 22:45)  T(F): 97.5 (03 Mar 2020 22:45), Max: 97.5 (03 Mar 2020 22:45)  HR: 120 (03 Mar 2020 23:10) (114 - 142)  BP: 123/72 (03 Mar 2020 23:10) (117/76 - 126/74)  RR: 20 (03 Mar 2020 22:45) (20 - 20)  SpO2: 94% (03 Mar 2020 20:32) (94% - 94%)      I&O's Detail        LABS:                        12.7   20.05 )-----------( 249      ( 03 Mar 2020 21:11 )             37.5     03-03    136  |  102  |  42<H>  ----------------------------<  201<H>  3.1<L>   |  17<L>  |  1.13    Ca    9.6      03 Mar 2020 21:11    TPro  6.9  /  Alb  3.3  /  TBili  0.2  /  DBili  x   /  AST  20  /  ALT  21  /  AlkPhos  94  03-03      CARDIAC MARKERS ( 03 Mar 2020 21:11 )  .346 ng/mL / x     / x     / x     / x          CAPILLARY BLOOD GLUCOSE        PT/INR - ( 03 Mar 2020 21:11 )   PT: 19.1 sec;   INR: 1.69 ratio         PTT - ( 03 Mar 2020 21:11 )  PTT:41.3 sec    CULTURES:      Physical Examination:    General: No acute distress. dried blood noted on mouth and on lower groin area, pale, mucous membranes dry     HEENT: Pupils equal, reactive to light.  Symmetric.    PULM: Clear to auscultation bilaterally, no significant sputum production    CVS: Tachy, no murmurs, rubs, or gallops    ABD: Soft, moderately distended, nontender,  no masses    EXT: No edema, nontender    SKIN: Warm and well perfused    Neuro: unable to follow commands, nonverbal, face is symmetric       RADIOLOGY:   < from: CT Abdomen and Pelvis w/ IV Cont (03.03.20 @ 22:30) >  INTERPRETATION:  CLINICAL INFORMATION: Abdominal pain with dark stools. Leukocytosis.    COMPARISON: CT abdomen/pelvis of 1/25/2019.    PROCEDURE:  CT of the Abdomen and Pelvis was performed with intravenous contrast.   Intravenous contrast: 92 ml Omnipaque 350. 8 ml discarded.  Oral contrast: None.  Sagittal and coronal reformats were performed.    FINDINGS:    LOWER CHEST: Within normal limits.    LIVER: Within normal limits.  BILE DUCTS: Normal caliber.  GALLBLADDER: Within normal limits.  SPLEEN: Within normal limits.  PANCREAS: Within normal limits.  ADRENALS: Within normal limits.  KIDNEYS/URETERS: Within normal limits.    BLADDER: Within normal limits.  REPRODUCTIVE ORGANS: Prostate within normal limits.    BOWEL: Couple nonspecific mildly dilated loops of fluid-filled small bowel within the left upper and right lower quadrants without discrete transition. Colon is air and fluiddistended. Moderate hiatal hernia. Stomach and adrenal hernia are distended with fluid and debris. Appendix is normal.  PERITONEUM: Trace ascites within the right lower quadrant.  VESSELS: Within normal limits.  RETROPERITONEUM/LYMPH NODES: No lymphadenopathy.    ABDOMINAL WALL: Within normal limits.  BONES: Multilevel degenerative changes of the spine.    IMPRESSION:     Few mildly dilated loops of small bowel without discrete transition. Colon is fluid and air distended. Stomach is distended with fluid and debris.    Trace ascites.    < end of copied text > Patient is a 64y old  Male who presents with a chief complaint of Gi bleed     BRIEF HOSPITAL COURSE:   Patient is a 64 year old male with a pmhx of DVT +left femoral/popliteal on xarelto, MR, autism, non-verbal, bipolar disorder, schizophrenia, gerd, htn, hld, hematuria, bph who presents from group home for shortness of breath. Per aide at bedside, they noticed patient was having some mild shortness of breath along with an enlarged stomach. Nursing home staff then took vitals which revealed HR elevated to 130s and was then sent to [facility] ED. Upon presentation, patient noted to have WBC 20K, Hg 12.7, tachycardia to 130s, /82. Upon workup, patient noted to have several episodes of hematemesis described as coffee ground in color. Also now having maroon colored bowel movements with minimal brighter clots mixed in. Sent for CT angio. Was fecal occult positive. Of note patient admitted to ICU in January 2019 for ABLA/GI bleed, EGD done at that time significant for gastric and duodenal ulcers. Patient started back on xarelto and discharged back to group home. Patient seen and examined at beside is nonverbal.        PAST MEDICAL & SURGICAL HISTORY:  Mental retardation  Aggressiveness  Agitation  Nonverbal  Autism  Bipolar affective disorder  Schizophrenia, paranoid type  GERD (gastroesophageal reflux disease)  Hypertension  Hyperlipidemia  Hematuria  BPH (benign prostatic hypertrophy) with urinary obstruction  Fracture of left malleolus: repaired in 7/2018    Allergies    No Known Allergies    Intolerances      FAMILY HISTORY:  No pertinent family history in first degree relatives  Family history unknown    Social:  MRDD  nonverbal     Review of Systems:  nonverbal       Medications:  pantoprazole  Injectable 40 milliGRAM(s) IV Push Once  potassium chloride  10 mEq/100 mL IVPB 10 milliEquivalent(s) IV Intermittent every 1 hour  sodium chloride 0.9% Bolus 1000 milliLiter(s) IV Bolus once  chlorhexidine 2% Cloths 1 Application(s) Topical <User Schedule>        ICU Vital Signs Last 24 Hrs  T(C): 36.4 (03 Mar 2020 22:45), Max: 36.4 (03 Mar 2020 22:45)  T(F): 97.5 (03 Mar 2020 22:45), Max: 97.5 (03 Mar 2020 22:45)  HR: 120 (03 Mar 2020 23:10) (114 - 142)  BP: 123/72 (03 Mar 2020 23:10) (117/76 - 126/74)  RR: 20 (03 Mar 2020 22:45) (20 - 20)  SpO2: 94% (03 Mar 2020 20:32) (94% - 94%)      I&O's Detail        LABS:                        12.7   20.05 )-----------( 249      ( 03 Mar 2020 21:11 )             37.5     03-03    136  |  102  |  42<H>  ----------------------------<  201<H>  3.1<L>   |  17<L>  |  1.13    Ca    9.6      03 Mar 2020 21:11    TPro  6.9  /  Alb  3.3  /  TBili  0.2  /  DBili  x   /  AST  20  /  ALT  21  /  AlkPhos  94  03-03      CARDIAC MARKERS ( 03 Mar 2020 21:11 )  .346 ng/mL / x     / x     / x     / x          CAPILLARY BLOOD GLUCOSE        PT/INR - ( 03 Mar 2020 21:11 )   PT: 19.1 sec;   INR: 1.69 ratio         PTT - ( 03 Mar 2020 21:11 )  PTT:41.3 sec    CULTURES:      Physical Examination:    General: No acute distress. dried blood noted on mouth and on lower groin area, pale, mucous membranes dry     HEENT: Pupils equal, reactive to light.  Symmetric.    PULM: Clear to auscultation bilaterally, no significant sputum production    CVS: Tachy, no murmurs, rubs, or gallops    ABD: Soft, moderately distended, nontender,  no masses    EXT: No edema, nontender    SKIN: Warm and well perfused    Neuro: unable to follow commands, nonverbal, face is symmetric       RADIOLOGY:   < from: CT Abdomen and Pelvis w/ IV Cont (03.03.20 @ 22:30) >  INTERPRETATION:  CLINICAL INFORMATION: Abdominal pain with dark stools. Leukocytosis.    COMPARISON: CT abdomen/pelvis of 1/25/2019.    PROCEDURE:  CT of the Abdomen and Pelvis was performed with intravenous contrast.   Intravenous contrast: 92 ml Omnipaque 350. 8 ml discarded.  Oral contrast: None.  Sagittal and coronal reformats were performed.    FINDINGS:    LOWER CHEST: Within normal limits.    LIVER: Within normal limits.  BILE DUCTS: Normal caliber.  GALLBLADDER: Within normal limits.  SPLEEN: Within normal limits.  PANCREAS: Within normal limits.  ADRENALS: Within normal limits.  KIDNEYS/URETERS: Within normal limits.    BLADDER: Within normal limits.  REPRODUCTIVE ORGANS: Prostate within normal limits.    BOWEL: Couple nonspecific mildly dilated loops of fluid-filled small bowel within the left upper and right lower quadrants without discrete transition. Colon is air and fluiddistended. Moderate hiatal hernia. Stomach and adrenal hernia are distended with fluid and debris. Appendix is normal.  PERITONEUM: Trace ascites within the right lower quadrant.  VESSELS: Within normal limits.  RETROPERITONEUM/LYMPH NODES: No lymphadenopathy.    ABDOMINAL WALL: Within normal limits.  BONES: Multilevel degenerative changes of the spine.    IMPRESSION:     Few mildly dilated loops of small bowel without discrete transition. Colon is fluid and air distended. Stomach is distended with fluid and debris.    Trace ascites.    < end of copied text >

## 2020-03-04 ENCOUNTER — RESULT REVIEW (OUTPATIENT)
Age: 65
End: 2020-03-04

## 2020-03-04 DIAGNOSIS — K92.2 GASTROINTESTINAL HEMORRHAGE, UNSPECIFIED: ICD-10-CM

## 2020-03-04 LAB
ALBUMIN SERPL ELPH-MCNC: 2.3 G/DL — LOW (ref 3.3–5)
ALP SERPL-CCNC: 66 U/L — SIGNIFICANT CHANGE UP (ref 30–120)
ALT FLD-CCNC: 17 U/L DA — SIGNIFICANT CHANGE UP (ref 10–60)
ANION GAP SERPL CALC-SCNC: 7 MMOL/L — SIGNIFICANT CHANGE UP (ref 5–17)
ANION GAP SERPL CALC-SCNC: 8 MMOL/L — SIGNIFICANT CHANGE UP (ref 5–17)
APPEARANCE UR: CLEAR — SIGNIFICANT CHANGE UP
AST SERPL-CCNC: 22 U/L — SIGNIFICANT CHANGE UP (ref 10–40)
BACTERIA # UR AUTO: ABNORMAL
BILIRUB SERPL-MCNC: 0.3 MG/DL — SIGNIFICANT CHANGE UP (ref 0.2–1.2)
BILIRUB UR-MCNC: NEGATIVE — SIGNIFICANT CHANGE UP
BUN SERPL-MCNC: 41 MG/DL — HIGH (ref 7–23)
BUN SERPL-MCNC: 42 MG/DL — HIGH (ref 7–23)
CALCIUM SERPL-MCNC: 7.9 MG/DL — LOW (ref 8.4–10.5)
CALCIUM SERPL-MCNC: 8.2 MG/DL — LOW (ref 8.4–10.5)
CHLORIDE SERPL-SCNC: 109 MMOL/L — HIGH (ref 96–108)
CHLORIDE SERPL-SCNC: 111 MMOL/L — HIGH (ref 96–108)
CO2 SERPL-SCNC: 22 MMOL/L — SIGNIFICANT CHANGE UP (ref 22–31)
CO2 SERPL-SCNC: 24 MMOL/L — SIGNIFICANT CHANGE UP (ref 22–31)
COLOR SPEC: YELLOW — SIGNIFICANT CHANGE UP
CREAT SERPL-MCNC: 0.79 MG/DL — SIGNIFICANT CHANGE UP (ref 0.5–1.3)
CREAT SERPL-MCNC: 0.83 MG/DL — SIGNIFICANT CHANGE UP (ref 0.5–1.3)
DIFF PNL FLD: NEGATIVE — SIGNIFICANT CHANGE UP
EPI CELLS # UR: SIGNIFICANT CHANGE UP
GLUCOSE SERPL-MCNC: 123 MG/DL — HIGH (ref 70–99)
GLUCOSE SERPL-MCNC: 131 MG/DL — HIGH (ref 70–99)
GLUCOSE UR QL: NEGATIVE MG/DL — SIGNIFICANT CHANGE UP
HCT VFR BLD CALC: 28.4 % — LOW (ref 39–50)
HCT VFR BLD CALC: 29.1 % — LOW (ref 39–50)
HCT VFR BLD CALC: 30.9 % — LOW (ref 39–50)
HGB BLD-MCNC: 10.3 G/DL — LOW (ref 13–17)
HGB BLD-MCNC: 9.6 G/DL — LOW (ref 13–17)
HGB BLD-MCNC: 9.6 G/DL — LOW (ref 13–17)
KETONES UR-MCNC: ABNORMAL
LACTATE SERPL-SCNC: 2 MMOL/L — SIGNIFICANT CHANGE UP (ref 0.7–2)
LACTATE SERPL-SCNC: 3.6 MMOL/L — HIGH (ref 0.7–2)
LEUKOCYTE ESTERASE UR-ACNC: NEGATIVE — SIGNIFICANT CHANGE UP
MAGNESIUM SERPL-MCNC: 1.8 MG/DL — SIGNIFICANT CHANGE UP (ref 1.6–2.6)
MCHC RBC-ENTMCNC: 29.9 PG — SIGNIFICANT CHANGE UP (ref 27–34)
MCHC RBC-ENTMCNC: 30.8 PG — SIGNIFICANT CHANGE UP (ref 27–34)
MCHC RBC-ENTMCNC: 31.1 PG — SIGNIFICANT CHANGE UP (ref 27–34)
MCHC RBC-ENTMCNC: 33 GM/DL — SIGNIFICANT CHANGE UP (ref 32–36)
MCHC RBC-ENTMCNC: 33.3 GM/DL — SIGNIFICANT CHANGE UP (ref 32–36)
MCHC RBC-ENTMCNC: 33.8 GM/DL — SIGNIFICANT CHANGE UP (ref 32–36)
MCV RBC AUTO: 89.8 FL — SIGNIFICANT CHANGE UP (ref 80–100)
MCV RBC AUTO: 91 FL — SIGNIFICANT CHANGE UP (ref 80–100)
MCV RBC AUTO: 94.2 FL — SIGNIFICANT CHANGE UP (ref 80–100)
NITRITE UR-MCNC: NEGATIVE — SIGNIFICANT CHANGE UP
NRBC # BLD: 0 /100 WBCS — SIGNIFICANT CHANGE UP (ref 0–0)
PH UR: 6 — SIGNIFICANT CHANGE UP (ref 5–8)
PHOSPHATE SERPL-MCNC: 1.4 MG/DL — LOW (ref 2.5–4.5)
PHOSPHATE SERPL-MCNC: 2.8 MG/DL — SIGNIFICANT CHANGE UP (ref 2.5–4.5)
PLATELET # BLD AUTO: 193 K/UL — SIGNIFICANT CHANGE UP (ref 150–400)
PLATELET # BLD AUTO: 193 K/UL — SIGNIFICANT CHANGE UP (ref 150–400)
PLATELET # BLD AUTO: 97 K/UL — LOW (ref 150–400)
POTASSIUM SERPL-MCNC: 4.3 MMOL/L — SIGNIFICANT CHANGE UP (ref 3.5–5.3)
POTASSIUM SERPL-MCNC: 4.7 MMOL/L — SIGNIFICANT CHANGE UP (ref 3.5–5.3)
POTASSIUM SERPL-SCNC: 4.3 MMOL/L — SIGNIFICANT CHANGE UP (ref 3.5–5.3)
POTASSIUM SERPL-SCNC: 4.7 MMOL/L — SIGNIFICANT CHANGE UP (ref 3.5–5.3)
PROT SERPL-MCNC: 4.9 G/DL — LOW (ref 6–8.3)
PROT UR-MCNC: 30 MG/DL
RBC # BLD: 3.09 M/UL — LOW (ref 4.2–5.8)
RBC # BLD: 3.12 M/UL — LOW (ref 4.2–5.8)
RBC # BLD: 3.44 M/UL — LOW (ref 4.2–5.8)
RBC # FLD: 12.1 % — SIGNIFICANT CHANGE UP (ref 10.3–14.5)
RBC # FLD: 12.2 % — SIGNIFICANT CHANGE UP (ref 10.3–14.5)
RBC # FLD: 12.9 % — SIGNIFICANT CHANGE UP (ref 10.3–14.5)
RBC CASTS # UR COMP ASSIST: SIGNIFICANT CHANGE UP /HPF (ref 0–4)
SODIUM SERPL-SCNC: 140 MMOL/L — SIGNIFICANT CHANGE UP (ref 135–145)
SODIUM SERPL-SCNC: 141 MMOL/L — SIGNIFICANT CHANGE UP (ref 135–145)
SP GR SPEC: 1.01 — SIGNIFICANT CHANGE UP (ref 1.01–1.02)
TROPONIN I SERPL-MCNC: 0.72 NG/ML — HIGH (ref 0.02–0.06)
UROBILINOGEN FLD QL: NEGATIVE MG/DL — SIGNIFICANT CHANGE UP
WBC # BLD: 12.21 K/UL — HIGH (ref 3.8–10.5)
WBC # BLD: 14.49 K/UL — HIGH (ref 3.8–10.5)
WBC # BLD: 14.52 K/UL — HIGH (ref 3.8–10.5)
WBC # FLD AUTO: 12.21 K/UL — HIGH (ref 3.8–10.5)
WBC # FLD AUTO: 14.49 K/UL — HIGH (ref 3.8–10.5)
WBC # FLD AUTO: 14.52 K/UL — HIGH (ref 3.8–10.5)
WBC UR QL: SIGNIFICANT CHANGE UP

## 2020-03-04 PROCEDURE — 99233 SBSQ HOSP IP/OBS HIGH 50: CPT

## 2020-03-04 PROCEDURE — 88305 TISSUE EXAM BY PATHOLOGIST: CPT | Mod: 26

## 2020-03-04 PROCEDURE — 93306 TTE W/DOPPLER COMPLETE: CPT | Mod: 26

## 2020-03-04 PROCEDURE — 88312 SPECIAL STAINS GROUP 1: CPT | Mod: 26

## 2020-03-04 RX ORDER — ATORVASTATIN CALCIUM 80 MG/1
10 TABLET, FILM COATED ORAL AT BEDTIME
Refills: 0 | Status: DISCONTINUED | OUTPATIENT
Start: 2020-03-04 | End: 2020-03-06

## 2020-03-04 RX ORDER — FLUTICASONE PROPIONATE 50 MCG
1 SPRAY, SUSPENSION NASAL
Refills: 0 | Status: DISCONTINUED | OUTPATIENT
Start: 2020-03-04 | End: 2020-03-06

## 2020-03-04 RX ORDER — POTASSIUM PHOSPHATE, MONOBASIC POTASSIUM PHOSPHATE, DIBASIC 236; 224 MG/ML; MG/ML
15 INJECTION, SOLUTION INTRAVENOUS ONCE
Refills: 0 | Status: COMPLETED | OUTPATIENT
Start: 2020-03-04 | End: 2020-03-04

## 2020-03-04 RX ORDER — POTASSIUM CHLORIDE 20 MEQ
10 PACKET (EA) ORAL
Refills: 0 | Status: DISCONTINUED | OUTPATIENT
Start: 2020-03-04 | End: 2020-03-04

## 2020-03-04 RX ORDER — SODIUM CHLORIDE 9 MG/ML
1000 INJECTION, SOLUTION INTRAVENOUS
Refills: 0 | Status: DISCONTINUED | OUTPATIENT
Start: 2020-03-04 | End: 2020-03-05

## 2020-03-04 RX ORDER — FLUVOXAMINE MALEATE 25 MG/1
50 TABLET ORAL
Refills: 0 | Status: DISCONTINUED | OUTPATIENT
Start: 2020-03-04 | End: 2020-03-06

## 2020-03-04 RX ORDER — SUCRALFATE 1 G
1 TABLET ORAL EVERY 6 HOURS
Refills: 0 | Status: DISCONTINUED | OUTPATIENT
Start: 2020-03-04 | End: 2020-03-06

## 2020-03-04 RX ORDER — ONDANSETRON 8 MG/1
4 TABLET, FILM COATED ORAL EVERY 6 HOURS
Refills: 0 | Status: DISCONTINUED | OUTPATIENT
Start: 2020-03-04 | End: 2020-03-06

## 2020-03-04 RX ORDER — TAMSULOSIN HYDROCHLORIDE 0.4 MG/1
0.4 CAPSULE ORAL AT BEDTIME
Refills: 0 | Status: DISCONTINUED | OUTPATIENT
Start: 2020-03-04 | End: 2020-03-06

## 2020-03-04 RX ORDER — RISPERIDONE 4 MG/1
2 TABLET ORAL
Refills: 0 | Status: DISCONTINUED | OUTPATIENT
Start: 2020-03-04 | End: 2020-03-06

## 2020-03-04 RX ADMIN — SODIUM CHLORIDE 100 MILLILITER(S): 9 INJECTION, SOLUTION INTRAVENOUS at 01:51

## 2020-03-04 RX ADMIN — Medication 1 GRAM(S): at 18:51

## 2020-03-04 RX ADMIN — Medication 100 MILLIEQUIVALENT(S): at 01:51

## 2020-03-04 RX ADMIN — FLUVOXAMINE MALEATE 50 MILLIGRAM(S): 25 TABLET ORAL at 18:32

## 2020-03-04 RX ADMIN — Medication 1 GRAM(S): at 23:31

## 2020-03-04 RX ADMIN — Medication 100 MILLIEQUIVALENT(S): at 02:46

## 2020-03-04 RX ADMIN — Medication 100 MILLIEQUIVALENT(S): at 05:06

## 2020-03-04 RX ADMIN — SODIUM CHLORIDE 100 MILLILITER(S): 9 INJECTION, SOLUTION INTRAVENOUS at 13:07

## 2020-03-04 RX ADMIN — PANTOPRAZOLE SODIUM 10 MG/HR: 20 TABLET, DELAYED RELEASE ORAL at 13:07

## 2020-03-04 RX ADMIN — RISPERIDONE 2 MILLIGRAM(S): 4 TABLET ORAL at 18:32

## 2020-03-04 RX ADMIN — ATORVASTATIN CALCIUM 10 MILLIGRAM(S): 80 TABLET, FILM COATED ORAL at 22:14

## 2020-03-04 RX ADMIN — TAMSULOSIN HYDROCHLORIDE 0.4 MILLIGRAM(S): 0.4 CAPSULE ORAL at 22:14

## 2020-03-04 RX ADMIN — Medication 1 SPRAY(S): at 06:10

## 2020-03-04 RX ADMIN — POTASSIUM PHOSPHATE, MONOBASIC POTASSIUM PHOSPHATE, DIBASIC 62.5 MILLIMOLE(S): 236; 224 INJECTION, SOLUTION INTRAVENOUS at 09:45

## 2020-03-04 RX ADMIN — PANTOPRAZOLE SODIUM 10 MG/HR: 20 TABLET, DELAYED RELEASE ORAL at 22:14

## 2020-03-04 RX ADMIN — Medication 100 MILLIEQUIVALENT(S): at 04:06

## 2020-03-04 RX ADMIN — PANTOPRAZOLE SODIUM 10 MG/HR: 20 TABLET, DELAYED RELEASE ORAL at 02:01

## 2020-03-04 RX ADMIN — CHLORHEXIDINE GLUCONATE 1 APPLICATION(S): 213 SOLUTION TOPICAL at 06:12

## 2020-03-04 RX ADMIN — Medication 1 SPRAY(S): at 18:32

## 2020-03-04 NOTE — CONSULT NOTE ADULT - SUBJECTIVE AND OBJECTIVE BOX
Chief Complaint:  Patient is a 64y old  Male who presents with a chief complaint of GI bleed (04 Mar 2020 08:14)    Fall (on) (from) other stairs and steps, initial encounter  Mental retardation  Aggressiveness  Agitation  Nonverbal  Autism  Bipolar affective disorder  Schizophrenia, paranoid type  GERD (gastroesophageal reflux disease)  Hypertension  Hyperlipidemia  Hematuria  BPH (benign prostatic hypertrophy) with urinary obstruction  Fracture of left malleolus  No significant past surgical history     HPI:  64M with profound MR/autism with aggression and agitated behavior, bipolar affective disorder, schizophrenia paranoid type, hx of DVT on Xarelto currently, BPH, GERD, hx of hematuria who presents with GI bleeding.  Patient is unable to provide any history given his profound MR and therefore collateral information obtain from notes and charts.  Per notes, patient was complaining of abdominal pain was starting to look pale at the facility.  Staff unsure of any dark stools or hematemesis.  No known nausea/vomiting, fevers/chills.  Patient is on xarelto and iron.  In the ED, patient was noted to be tachycardic to 142 but had a stable BP at 126/74.  Initial hgb was 12.7.  During the ED course, patient then started to have active melena and hematemesis.  CTA was done that showed few mildly dilated loops of bowel.  A STAT hgb was done and was 11.6.  Patient's vitals remained stable with a HR in the 110s.  Started on IV PPI.  Labs also showed a lactate 3.6 with an elevated troponin 0.346.  Patient is being admitted to [facility] for further management of GI bleed. (04 Mar 2020 00:17)      No Known Allergies      atorvastatin 10 milliGRAM(s) Oral at bedtime  chlorhexidine 2% Cloths 1 Application(s) Topical <User Schedule>  fluticasone propionate 50 MICROgram(s)/spray Nasal Spray 1 Spray(s) Both Nostrils two times a day  fluvoxaMINE 50 milliGRAM(s) Oral two times a day  lactated ringers. 1000 milliLiter(s) IV Continuous <Continuous>  ondansetron Injectable 4 milliGRAM(s) IV Push every 6 hours PRN  pantoprazole Infusion 8 mG/Hr IV Continuous <Continuous>  risperiDONE   Tablet 2 milliGRAM(s) Oral two times a day  tamsulosin 0.4 milliGRAM(s) Oral at bedtime        FAMILY HISTORY:  No pertinent family history in first degree relatives        Review of Systems:    General:  No wt loss, fevers, chills, night sweats,fatigue,   Eyes:  Good vision, no reported pain  ENT:  No sore throat, pain, runny nose, dysphagia  CV:  No pain, palpitatioins, hypo/hypertension  Resp:  No dyspnea, cough, tachypnea, wheezing  :  No pain, bleeding, incontinence, nocturia  Muscle:  No pain, weakness  Neuro:  No weakness, tingling, memory problems  Psych:  No fatigue, insomnia, mood problems, depression  Endocrine:  No polyuria, polydypsia, cold/heat intolerance  Heme:  No petechiae, ecchymosis, easy bruisability  Skin:  No rash, tattoos, scars, edema    Relevant Family History:       Relevant Social History:       Physical Exam:    Vital Signs:  Vital Signs Last 24 Hrs  T(C): 37.4 (04 Mar 2020 04:04), Max: 37.4 (04 Mar 2020 04:04)  T(F): 99.3 (04 Mar 2020 04:04), Max: 99.3 (04 Mar 2020 04:04)  HR: 105 (04 Mar 2020 09:00) (105 - 142)  BP: 123/87 (04 Mar 2020 09:00) (113/74 - 135/76)  BP(mean): 98 (04 Mar 2020 09:00) (80 - 99)  RR: 24 (04 Mar 2020 09:00) (19 - 32)  SpO2: 98% (04 Mar 2020 09:00) (91% - 98%)  Daily     Daily Weight in k.1 (04 Mar 2020 01:31)    General:  Appears stated age, well-groomed, well-nourished, no distress  HEENT:  NC/AT,  conjunctivae clear and pink, no thyromegaly, nodules, adenopathy, no JVD  Chest:  Full & symmetric excursion, no increased effort, breath sounds clear  Cardiovascular:  Regular rhythm, S1, S2, no murmur/rub/S3/S4, no abdominal bruit, no edema  Abdomen:  Soft, non-tender, non-distended, normoactive bowel sounds,  no masses ,no hepatosplenomeagaly, no signs of chronic liver disease  Extremities:  no cyanosis,clubbing or edema  Skin:  No rash/erythema/ecchymoses/petechiae/wounds/abscess/warm/dry  Neuro/Psych:  Alert, oriented, no asterixis, no tremor, no encephalopathy    Laboratory:                            9.6    14.52 )-----------( 97       ( 04 Mar 2020 04:34 )             29.1     03-04    141  |  111<H>  |  41<H>  ----------------------------<  131<H>  4.3   |  22  |  0.79    Ca    8.2<L>      04 Mar 2020 06:38  Phos  1.4     -  Mg     1.8     -    TPro  6.9  /  Alb  3.3  /  TBili  0.2  /  DBili  x   /  AST  20  /  ALT  21  /  AlkPhos  94  03-03    LIVER FUNCTIONS - ( 03 Mar 2020 21:11 )  Alb: 3.3 g/dL / Pro: 6.9 g/dL / ALK PHOS: 94 U/L / ALT: 21 U/L DA / AST: 20 U/L / GGT: x           PT/INR - ( 03 Mar 2020 21:11 )   PT: 19.1 sec;   INR: 1.69 ratio         PTT - ( 03 Mar 2020 21:11 )  PTT:41.3 sec  Urinalysis Basic - ( 04 Mar 2020 00:52 )    Color: Yellow / Appearance: Clear / S.015 / pH: x  Gluc: x / Ketone: Small  / Bili: Negative / Urobili: Negative mg/dL   Blood: x / Protein: 30 mg/dL / Nitrite: Negative   Leuk Esterase: Negative / RBC: 0-2 /HPF / WBC 0-2   Sq Epi: x / Non Sq Epi: Occasional / Bacteria: Occasional        Imaging:

## 2020-03-04 NOTE — DIETITIAN INITIAL EVALUATION ADULT. - PERTINENT LABORATORY DATA
WBC 14.49, H/H 9.6/28.4, BUN 41, Ca 8.2 Ca 8.2   WBC 14.52, H/H 9.6/29.1 BUN 41, phosphorus 1.4, small ketones in urine

## 2020-03-04 NOTE — PATIENT PROFILE ADULT - NSPRONUTRITIONRISK_GEN_A_NUR
Vaccine Information Statement(s) for was given today. This has been reviewed, questions answered, and verbal consent given by Patient for injection(s) and administration of Influenza (Inactivated). 1. Does the patient have a moderate to severe fever? No  2. Has the patient had a serious reaction to a flu shot before? No  3. Has the patient ever had Guillian Wallback Syndrome within 6 weeks of a previous flu shot? No  4. Does the patient have a serious allergy to eggs? No  5. Is the patient less that 10months of age? No    Patient is eligible to receive the vaccine based on all questions being answered as 'No'. Patient tolerated without incident. See immunization grid for documentation. No indicators present

## 2020-03-04 NOTE — H&P ADULT - HISTORY OF PRESENT ILLNESS
64M with profound MR, 64M with profound MR/autism with aggression and agitated behavior, bipolar affective disorder, schizophrenia paranoid type, BPH, GERD, hx of hematuria who presents with GI bleeding.  Patient is unable to provide any history given his profound MR and therefore collateral information obtain from notes and charts.  Per notes, patient was complaining of abdominal pain was starting to look pale at the facility.  Staff unsure of any dark stools or hematemesis.  No known nausea/vomiting, fevers/chills.  Patient is on xarelto and iron.  In the ED, patient was noted to be tachycardic to 142 but had a stable BP at 126/74.  Initial hgb was 12.7.  During the ED course, patient then started to have active melena and hematemesis.  CTA was done that showed few mildly dilated loops of bowel.  A STAT hgb was done and was 11.6.  Patient's vitals remained stable with a HR in the 110s.  Started on IV PPI.  Labs also showed a lactate 3.6 with an elevated troponin 0.346.  Patient is being admitted to [facility]PC[facility] for further management of GI bleed. 64M with profound MR/autism with aggression and agitated behavior, bipolar affective disorder, schizophrenia paranoid type, hx of DVT on Xarelto currently, BPH, GERD, hx of hematuria who presents with GI bleeding.  Patient is unable to provide any history given his profound MR and therefore collateral information obtain from notes and charts.  Per notes, patient was complaining of abdominal pain was starting to look pale at the facility.  Staff unsure of any dark stools or hematemesis.  No known nausea/vomiting, fevers/chills.  Patient is on xarelto and iron.  In the ED, patient was noted to be tachycardic to 142 but had a stable BP at 126/74.  Initial hgb was 12.7.  During the ED course, patient then started to have active melena and hematemesis.  CTA was done that showed few mildly dilated loops of bowel.  A STAT hgb was done and was 11.6.  Patient's vitals remained stable with a HR in the 110s.  Started on IV PPI.  Labs also showed a lactate 3.6 with an elevated troponin 0.346.  Patient is being admitted to SPCU for further management of GI bleed.

## 2020-03-04 NOTE — DIETITIAN INITIAL EVALUATION ADULT. - OTHER INFO
64M with profound MR/autism with aggression and agitated behavior, bipolar affective disorder, schizophrenia paranoid type, hx of DVT on Xarelto currently, BPH, GERD, hx of hematuria who presents with abdominal pain  and  GI bleeding.  During the ED course, patient then started to have active melena and hematemesis.  CTA was done that showed few mildly dilated loops of bowel. Pt found to have low hemoglobin and s/p transfusion. EGD is pending. Pt NPO. As per group home staff at bedside, PTA pt was a good eater and was on puree/thin liquids. No reported weight loss. Physically, pt appears well-nourished. Current weight  222.8# (with  b/l feet +1) 64M with profound MR/autism with aggression and agitated behavior, bipolar affective disorder, schizophrenia paranoid type, hx of DVT on Xarelto currently, BPH, GERD, hx of hematuria who presents with abdominal pain  and  GI bleeding.  During the ED course, patient then started to have active melena and hematemesis.  CTA was done that showed few mildly dilated loops of bowel. Pt found to have low hemoglobin and s/p transfusion. As per group home staff at bedside, PTA pt was a good eater and was on puree/thin liquids. No reported weight loss. Physically, pt appears well-nourished. Current weight  222.8# (with  b/l feet +1.)  EGD is pending. Pt NPO.

## 2020-03-04 NOTE — CONSULT NOTE ADULT - ASSESSMENT
The patient is a 64 year old male with a history of MRDD, bipolar disorder, prior DVT, BPH, GERD who is admitted with GI bleed.    Plan:  - ECG with no evidence of ischemia or infarction  - Troponin elevated at 0.722. Continue to trend. Elevated in the setting of demand ischemia from ongoing GI bleed.  - Check echo  - Hold anticoagulation  - Continue IV fluids  - On pantoprazole drip  - GI eval The patient is a 64 year old male with a history of MRDD, bipolar disorder, prior DVT, BPH, GERD who is admitted with GI bleed.    Plan:  - ECG with no evidence of ischemia or infarction  - Troponin elevated at 0.722. Continue to trend. Elevated in the setting of demand ischemia from ongoing GI bleed.  - Check echo  - Hold anticoagulation  - Continue IV fluids  - On pantoprazole drip  - GI eval    ADDENDUM:  - Echo with hyperdynamic LV systolic function, no AS, no pulm HTN  - The patient is at low risk for cardiac events for a low risk procedure (EGD). He is optimized to proceed from a cardiac perspective.

## 2020-03-04 NOTE — CONSULT NOTE ADULT - SUBJECTIVE AND OBJECTIVE BOX
History of Present Illness: The patient is a 64 year old male with a history of MRDD, bipolar disorder, prior DVT, BPH, GERD who is admitted with GI bleed. The patient is unable to provide additional history to me. Per notes, he had abdominal pain and looked pale. In ED, he had melena and hematemesis.    Past Medical/Surgical History:    Medications:    Family History: Non-contributory family history of premature cardiovascular atherosclerotic disease    Social History: No tobacco, alcohol or drug use    Review of Systems:  General: No fevers, chills, weight loss or gain  Skin: No rashes, color changes  Cardiovascular: No chest pain, orthopnea  Respiratory: No shortness of breath, cough  Gastrointestinal: No nausea, abdominal pain  Genitourinary: No incontinence, pain with urination  Musculoskeletal: No pain, swelling, decreased range of motion  Neurological: No headache, weakness  Psychiatric: No depression, anxiety  Endocrine: No weight loss or gain, increased thirst  All other systems are comprehensively negative.    Physical Exam:  Vitals:        Vital Signs Last 24 Hrs  T(C): 37.4 (04 Mar 2020 04:04), Max: 37.4 (04 Mar 2020 04:04)  T(F): 99.3 (04 Mar 2020 04:04), Max: 99.3 (04 Mar 2020 04:04)  HR: 111 (04 Mar 2020 06:00) (106 - 142)  BP: 129/65 (04 Mar 2020 06:00) (113/74 - 135/76)  BP(mean): 80 (04 Mar 2020 06:00) (80 - 99)  RR: 21 (04 Mar 2020 06:00) (19 - 32)  SpO2: 92% (04 Mar 2020 06:00) (91% - 97%)  General: NAD  HEENT: MMM  Neck: No JVD, no carotid bruit  Lungs: CTAB  CV: RRR, nl S1/S2, no M/R/G  Abdomen: S/NT/ND, +BS  Extremities: No LE edema, no cyanosis  Neuro: AAOx3, non-focal  Skin: No rash    Labs:                        9.6    14.52 )-----------( 97       ( 04 Mar 2020 04:34 )             29.1     03-04    141  |  111<H>  |  41<H>  ----------------------------<  131<H>  4.3   |  22  |  0.79    Ca    8.2<L>      04 Mar 2020 06:38  Phos  1.4     03-04  Mg     1.8     03-04    TPro  6.9  /  Alb  3.3  /  TBili  0.2  /  DBili  x   /  AST  20  /  ALT  21  /  AlkPhos  94  03-03    CARDIAC MARKERS ( 04 Mar 2020 06:38 )  .722 ng/mL / x     / x     / x     / x      CARDIAC MARKERS ( 03 Mar 2020 21:11 )  .346 ng/mL / x     / x     / x     / x          PT/INR - ( 03 Mar 2020 21:11 )   PT: 19.1 sec;   INR: 1.69 ratio         PTT - ( 03 Mar 2020 21:11 )  PTT:41.3 sec    ECG: Sinus tachycardia, nonspecific ST abnormality

## 2020-03-04 NOTE — ED ADULT NURSE REASSESSMENT NOTE - NS ED NURSE REASSESS COMMENT FT1
pt had large BM; dark red loose stool; Dr Westbrook made aware; continue to monitor
Returning from CT scan. Medicated as per orders

## 2020-03-04 NOTE — H&P ADULT - NSHPPHYSICALEXAM_GEN_ALL_CORE
PHYSICAL EXAM:  Vital Signs Last 24 Hrs  T(C): 36.4 (03 Mar 2020 22:45), Max: 36.4 (03 Mar 2020 22:45)  T(F): 97.5 (03 Mar 2020 22:45), Max: 97.5 (03 Mar 2020 22:45)  HR: 116 (03 Mar 2020 23:30) (114 - 142)  BP: 115/72 (03 Mar 2020 23:30) (115/72 - 126/74)  BP(mean): --  RR: 20 (03 Mar 2020 23:30) (20 - 20)  SpO2: 94% (03 Mar 2020 23:30) (94% - 94%)    GENERAL:     overweight male in NAD, pale appearing  HEAD:     atraumatic, normocephalic  EYES:     EOMI  ENMT:     no tonsillar erythema or exudates or enlargement, no oral lesions, moist mucous membranes, good dentition  NECK:     supple, no JVD  RESPIRATORY:     grossly clear to auscultation bilaterally  CARDIOVASCULAR:     tachycardic, no murmurs or rubs or gallops, 2+ peripheral pulses  GASTROINTESTINAL:     soft, large but nontender, no hepatosplenomegaly palpated, bowel sounds present  EXTREMITIES:     no clubbing or cyanosis or edema  MUSCULOSKELETAL:     no joint pain or swelling or deformities  NERVOUS SYSTEM:     motor strength intact with 5/5 B/L upper and lower extremities  SKIN:     no rashes or lesions  PSYCH:     awake

## 2020-03-04 NOTE — H&P ADULT - ASSESSMENT
64M with profound MR/autism with aggression and agitated behavior, bipolar affective disorder, schizophrenia paranoid type, BPH, GERD, hx of hematuria who presents with GI bleeding.   Was found to have a gastric ulcer on endoscopy last year when he was admitted for a GI bleed.      GI bleeding  - admitted to [facility]  - serial CBCs q8h  - trend lactate (does not appear to be septic, likely from hypoperfusion)   - GI consult (Dr. Aviles saw patient last year)  - cc consult (Dr. Lopez saw patient last year)  - IVF hydration for now  - hold anticoagulants such as NSAIDs, ASA, xarelto  - seems to have stabilized now -> no urgent need for reversal agents  - PPI infusion  - NPO  - active TS    Elevated troponin  - likely 2/2 demand ischemia  - will trend troponin  - holding AC's anyhow given active GI bleed  - cardiology consult    Bipolar/Schizophrenia  - cont fluvoxamine     BPH  - cont tamsulosin    GERD  - will be on PPI    HTN  - hold any HTN while actively bleeding     Preventive measures  IMPROVE VTE Individual Risk Assessment          RISK                                                          Points  [  ] Previous VTE                                                 3  [  ] Thrombophilia                                              2  [  ] Lower limb paralysis                                    2        (unable to hold up >15 seconds)    [  ] Current Cancer                                             2         (within 6 months)  [  ] Immobilization > 24 hrs                              1  [  ] ICU/CCU stay > 24 hours                            1  [X] Age > 60                                                        1    IMPROVE VTE Score 1  -holding ACs 2/2 active bleed

## 2020-03-04 NOTE — PROGRESS NOTE ADULT - SUBJECTIVE AND OBJECTIVE BOX
s/p  upper gastrointestinal endoscopy  gastritis  esophageal ulcer  large hjoiatal hernia    plan  clears ppi drip  carafate q 6   check cbc and transfuse

## 2020-03-04 NOTE — PROGRESS NOTE ADULT - SUBJECTIVE AND OBJECTIVE BOX
HPI: 63 y/o M w/ omh of MR, autism non-verbal, bipolar, schizophrenia, gerd, htn, hld, bph, L. femoral/popliteal DVT (on xarelto), presented to [facility] on 3/3/2020 by group home staff for sob. Pt was found to be tacycardic to 130s, Wbc of 20, hemoglobin of 12.7. Pt noted to have coffee ground hematemesis and gisel color BM with blood clots. Pt underwent a CTA w/ no acute finding and admitted to the MICU for further tx/evaulation.    24 hour events: Overnight pt continued to have have drop in h/h 12.7/37.5--->11.6/34.5--->9.6/29.1. GI consulted who will evaluate pt this morning.      Review of Systems: Unable to obtain ROS    T(F): 99.3 (20 @ 04:04), Max: 99.3 (20 @ 04:04)  HR: 105 (20 @ 09:00) (105 - 142)  BP: 123/87 (20 @ 09:00) (113/74 - 135/76)  RR: 24 (20 @ 09:00) (19 - 32)  SpO2: 98% (20 @ 09:00) (91% - 98%)  Wt(kg): --      20 @ 07:01  -  20 @ 07:00  --------------------------------------------------------  IN: 950 mL / OUT: 0 mL / NET: 950 mL        CAPILLARY BLOOD GLUCOSE          I&O's Summary    03 Mar 2020 07:01  -  04 Mar 2020 07:00  --------------------------------------------------------  IN: 950 mL / OUT: 0 mL / NET: 950 mL        Physical Exam:   Gen: Comfortable in bed in NAD  Neuro: awake and alert   HEENT: PERRL  Resp: CTA b/l  CVS: +RRR  Abd: BSx4, soft, NT w/ mild distention  Ext: no edema  Skin: warm/dry    Meds:    tamsulosin Oral    atorvastatin Oral      fluvoxaMINE Oral  ondansetron Injectable IV Push PRN  risperiDONE   Tablet Oral        pantoprazole Infusion IV Continuous      lactated ringers. IV Continuous  potassium phosphate IVPB IV Intermittent      chlorhexidine 2% Cloths Topical  fluticasone propionate 50 MICROgram(s)/spray Nasal Spray Both Nostrils                              9.6    14.52 )-----------( 97       ( 04 Mar 2020 04:34 )             29.1       03-04    141  |  111<H>  |  41<H>  ----------------------------<  131<H>  4.3   |  22  |  0.79    Ca    8.2<L>      04 Mar 2020 06:38  Phos  1.4     03-04  Mg     1.8     03-04    TPro  6.9  /  Alb  3.3  /  TBili  0.2  /  DBili  x   /  AST  20  /  ALT  21  /  AlkPhos  94  03-03    Lactate 2.0           03-04 @ 06:38    Lactate 3.6           03-03 @ 23:44      CARDIAC MARKERS ( 04 Mar 2020 06:38 )  .722 ng/mL / x     / x     / x     / x      CARDIAC MARKERS ( 03 Mar 2020 21:11 )  .346 ng/mL / x     / x     / x     / x          PT/INR - ( 03 Mar 2020 21:11 )   PT: 19.1 sec;   INR: 1.69 ratio         PTT - ( 03 Mar 2020 21:11 )  PTT:41.3 sec  Urinalysis Basic - ( 04 Mar 2020 00:52 )    Color: Yellow / Appearance: Clear / S.015 / pH: x  Gluc: x / Ketone: Small  / Bili: Negative / Urobili: Negative mg/dL   Blood: x / Protein: 30 mg/dL / Nitrite: Negative   Leuk Esterase: Negative / RBC: 0-2 /HPF / WBC 0-2   Sq Epi: x / Non Sq Epi: Occasional / Bacteria: Occasional        Radiology:     CENTRAL LINE: N  CORDOVA: N  A-LINE: N    GLOBAL ISSUE/BEST PRACTICE:  Analgesia: N  Sedation: N  CAM-ICU: n/a  HOB elevation: Y  Stress ulcer prophylaxis: Y  VTE prophylaxis: Y (SCD)  Glycemic control: Y  Nutrition: N    CODE STATUS: FULL CODE

## 2020-03-04 NOTE — CHART NOTE - NSCHARTNOTEFT_GEN_A_CORE
Overnight events:  Patient had 2 more episode of dark tary stools, less melena since ED and both without bright red clots as seen prior in ED   Also had episode of large amount of emesis, dark likely digested blood with pieces of undigested food   No bright red blood noted  STAT CBC ordered   HD stable   GI follow up in morning Overnight events:  Patient had 2 more episode of dark tary stools, less melena since ED and both without bright red clots as seen prior in ED   Also had episode of large amount of emesis, dark likely digested blood with pieces of undigested food   No bright red blood noted  Attempted NGT for lavage and drainage, however patient unable to tolerate procedure   STAT CBC ordered   HD stable   GI follow up in morning Overnight events:  Patient had 2 more episode of dark tary stools, less melena since ED and both without bright red clots as seen prior in ED   Also had episode of large amount of emesis, color was dark; likely digested blood with pieces of undigested food   No bright red blood noted  Attempted NGT for lavage and drainage, however patient unable to tolerate procedure   STAT CBC ordered   HD stable   GI follow up in morning

## 2020-03-04 NOTE — CONSULT NOTE ADULT - SUBJECTIVE AND OBJECTIVE BOX
Date/Time Patient Seen:  		  Referring MD:   Data Reviewed	       Patient is a 64y old  Male who presents with a chief complaint of GI bleed (04 Mar 2020 00:26)      Subjective/HPI  in bed  seen and examined  vs and meds reviewed  labs reviewed  H and P reviewed  ER provider note reviewed    GI bleed eval in progress    HPI:64 year old male with a pmhx of DVT +left femoral/popliteal on xarelto, MR, autism, non-verbal, bipolar disorder, schizophrenia, gerd, htn, hld, hematuria, bph who presents from group home for shortness of breath. Per aide at bedside, they noticed patient was having some mild shortness of breath along with an enlarged stomach. Nursing home staff then took vitals which revealed HR elevated to 130s and was then sent to [facility] ED. Upon presentation, patient noted to have WBC 20K, Hg 12.7, tachycardia to 130s, /82. Upon workup, patient noted to have several episodes of hematemesis described as coffee ground in color. Also now having maroon colored bowel movements with minimal brighter clots mixed in. Sent for CT angio. Was fecal occult positive. Of note patient admitted to ICU in January 2019 for ABLA/GI bleed, EGD done at that time significant for gastric and duodenal ulcers. Patient started back on xarelto and discharged back to group home. Patient seen and examined at beside is nonverbal.    PAST SURGICAL HISTORY:  Fracture of left malleolus repaired in 7/2018.     Tobacco Screening:  · Core Measure Site	No  · Has the patient used tobacco in the past 30 days?	Unable to assess due to patient's cognitive impairment    Risk Assessment:    Present on Admission:  Deep Venous Thrombosis	no  Pulmonary Embolus	no  Urinary Catheter	no  Central Venous Catheter/PICC Line	no  Surgical Site Incision	no  Pressure Ulcer(s)	no     Heart Failure:  Does this patient have a history of or has been diagnosed with heart failure? unknown.         PAST MEDICAL & SURGICAL HISTORY:  Fall (on) (from) other stairs and steps, initial encounter  Mental retardation  Aggressiveness  Agitation  Nonverbal  Autism  Bipolar affective disorder  Schizophrenia, paranoid type  GERD (gastroesophageal reflux disease)  Hypertension  Hyperlipidemia  Hematuria  BPH (benign prostatic hypertrophy) with urinary obstruction  Fracture of left malleolus: repaired in 7/2018  No significant past surgical history        Medication list         MEDICATIONS  (STANDING):  atorvastatin 10 milliGRAM(s) Oral at bedtime  chlorhexidine 2% Cloths 1 Application(s) Topical <User Schedule>  fluticasone propionate 50 MICROgram(s)/spray Nasal Spray 1 Spray(s) Both Nostrils two times a day  fluvoxaMINE 50 milliGRAM(s) Oral two times a day  lactated ringers. 1000 milliLiter(s) (100 mL/Hr) IV Continuous <Continuous>  pantoprazole Infusion 8 mG/Hr (10 mL/Hr) IV Continuous <Continuous>  risperiDONE   Tablet 2 milliGRAM(s) Oral two times a day  tamsulosin 0.4 milliGRAM(s) Oral at bedtime    MEDICATIONS  (PRN):  ondansetron Injectable 4 milliGRAM(s) IV Push every 6 hours PRN Nausea and/or Vomiting         Vitals log        ICU Vital Signs Last 24 Hrs  T(C): 37.4 (04 Mar 2020 04:04), Max: 37.4 (04 Mar 2020 04:04)  T(F): 99.3 (04 Mar 2020 04:04), Max: 99.3 (04 Mar 2020 04:04)  HR: 111 (04 Mar 2020 06:00) (106 - 142)  BP: 129/65 (04 Mar 2020 06:00) (113/74 - 135/76)  BP(mean): 80 (04 Mar 2020 06:00) (80 - 99)  ABP: --  ABP(mean): --  RR: 21 (04 Mar 2020 06:00) (19 - 32)  SpO2: 92% (04 Mar 2020 06:00) (91% - 97%)           Input and Output:  I&O's Detail    03 Mar 2020 07:01  -  04 Mar 2020 07:00  --------------------------------------------------------  IN:    IV PiggyBack: 400 mL    lactated ringers.: 500 mL    pantoprazole Infusion: 50 mL  Total IN: 950 mL    OUT:  Total OUT: 0 mL    Total NET: 950 mL          Lab Data                        9.6    14.52 )-----------( 97       ( 04 Mar 2020 04:34 )             29.1     03-04    141  |  111<H>  |  41<H>  ----------------------------<  131<H>  4.3   |  22  |  0.79    Ca    8.2<L>      04 Mar 2020 06:38  Phos  1.4     03-04  Mg     1.8     03-04    TPro  6.9  /  Alb  3.3  /  TBili  0.2  /  DBili  x   /  AST  20  /  ALT  21  /  AlkPhos  94  03-03      CARDIAC MARKERS ( 04 Mar 2020 06:38 )  .722 ng/mL / x     / x     / x     / x      CARDIAC MARKERS ( 03 Mar 2020 21:11 )  .346 ng/mL / x     / x     / x     / x            Review of Systems	  dark tarry stool    Objective     Physical Examination    heart s1s2  lung dec BS  abd soft  head nc  head at      Pertinent Lab findings & Imaging      Tegan:  NO   Adequate UO     I&O's Detail    03 Mar 2020 07:01  -  04 Mar 2020 07:00  --------------------------------------------------------  IN:    IV PiggyBack: 400 mL    lactated ringers.: 500 mL    pantoprazole Infusion: 50 mL  Total IN: 950 mL    OUT:  Total OUT: 0 mL    Total NET: 950 mL               Discussed with:     Cultures:	        Radiology            EXAM:  CT ABDOMEN AND PELVIS IC                                  PROCEDURE DATE:  03/03/2020          INTERPRETATION:  CLINICAL INFORMATION: Abdominal pain with dark stools. Leukocytosis.    COMPARISON: CT abdomen/pelvis of 1/25/2019.    PROCEDURE:   CT of the Abdomen and Pelvis was performed with intravenous contrast.   Intravenous contrast: 92 ml Omnipaque 350. 8 ml discarded.  Oral contrast: None.  Sagittal and coronal reformats were performed.    FINDINGS:    LOWER CHEST: Within normal limits.    LIVER: Within normal limits.  BILE DUCTS: Normal caliber.  GALLBLADDER: Within normal limits.  SPLEEN: Within normal limits.  PANCREAS: Within normal limits.  ADRENALS: Within normal limits.  KIDNEYS/URETERS: Within normal limits.    BLADDER: Within normal limits.  REPRODUCTIVE ORGANS: Prostate within normal limits.    BOWEL: Couple nonspecific mildly dilated loops of fluid-filled small bowel within the left upper and right lower quadrants without discrete transition. Colon is air and fluid distended. Moderate hiatal hernia. Stomach and adrenal hernia are distended with fluid and debris. Appendix is normal.  PERITONEUM: Trace ascites within the right lower quadrant.  VESSELS: Within normal limits.  RETROPERITONEUM/LYMPH NODES: No lymphadenopathy.    ABDOMINAL WALL: Within normal limits.  BONES: Multilevel degenerative changes of the spine.    IMPRESSION:     Few mildly dilated loops of small bowel without discrete transition. Colon is fluid and air distended. Stomach is distended with fluid and debris.    Trace ascites.                      BENJAMIN SCHILLING M.D., ATTENDING RADIOLOGIST  This document has been electronically signed. Mar  3 2020 11:36PM

## 2020-03-04 NOTE — H&P ADULT - NSICDXPASTMEDICALHX_GEN_ALL_CORE_FT
PAST MEDICAL HISTORY:  Aggressiveness     Agitation     Autism     Bipolar affective disorder     BPH (benign prostatic hypertrophy) with urinary obstruction     GERD (gastroesophageal reflux disease)     Hematuria     Hyperlipidemia     Hypertension     Mental retardation     Nonverbal     Schizophrenia, paranoid type

## 2020-03-04 NOTE — PROGRESS NOTE ADULT - SUBJECTIVE AND OBJECTIVE BOX
Patient is a 64y old  Male who presents with a chief complaint of GI bleed (04 Mar 2020 08:14)      FROM ADMISSION H+P:   HPI:  64M with profound MR/autism with aggression and agitated behavior, bipolar affective disorder, schizophrenia paranoid type, hx of DVT on Xarelto currently, BPH, GERD, hx of hematuria who presents with GI bleeding.  Patient is unable to provide any history given his profound MR and therefore collateral information obtain from notes and charts.  Per notes, patient was complaining of abdominal pain was starting to look pale at the facility.  Staff unsure of any dark stools or hematemesis.  No known nausea/vomiting, fevers/chills.  Patient is on xarelto and iron.  In the ED, patient was noted to be tachycardic to 142 but had a stable BP at 126/74.  Initial hgb was 12.7.  During the ED course, patient then started to have active melena and hematemesis.  CTA was done that showed few mildly dilated loops of bowel.  A STAT hgb was done and was 11.6.  Patient's vitals remained stable with a HR in the 110s.  Started on IV PPI.  Labs also showed a lactate 3.6 with an elevated troponin 0.346.  Patient is being admitted to SPCU for further management of GI bleed. (04 Mar 2020 00:17)      INTERVAL HPI/OVERNIGHT EVENTS: drop in h/h and plts. Spoke to brother/advocate Alexis - consents for blood product transfusion if necessary. GI evaluated planning for upper endoscopy. Patient appears comfortable in no distress. Had continued melena overnight.    I&O's Summary    03 Mar 2020 07:01  -  04 Mar 2020 07:00  --------------------------------------------------------  IN: 950 mL / OUT: 0 mL / NET: 950 mL        LABS:                        9.6    14.52 )-----------( 97       ( 04 Mar 2020 04:34 )             29.1     03-04    141  |  111<H>  |  41<H>  ----------------------------<  131<H>  4.3   |  22  |  0.79    Ca    8.2<L>      04 Mar 2020 06:38  Phos  1.4     03-04  Mg     1.8     03-04    TPro  6.9  /  Alb  3.3  /  TBili  0.2  /  DBili  x   /  AST  20  /  ALT  21  /  AlkPhos  94  03-03    PT/INR - ( 03 Mar 2020 21:11 )   PT: 19.1 sec;   INR: 1.69 ratio         PTT - ( 03 Mar 2020 21:11 )  PTT:41.3 sec  Urinalysis Basic - ( 04 Mar 2020 00:52 )    Color: Yellow / Appearance: Clear / S.015 / pH: x  Gluc: x / Ketone: Small  / Bili: Negative / Urobili: Negative mg/dL   Blood: x / Protein: 30 mg/dL / Nitrite: Negative   Leuk Esterase: Negative / RBC: 0-2 /HPF / WBC 0-2   Sq Epi: x / Non Sq Epi: Occasional / Bacteria: Occasional      CAPILLARY BLOOD GLUCOSE            Urinalysis Basic - ( 04 Mar 2020 00:52 )    Color: Yellow / Appearance: Clear / S.015 / pH: x  Gluc: x / Ketone: Small  / Bili: Negative / Urobili: Negative mg/dL   Blood: x / Protein: 30 mg/dL / Nitrite: Negative   Leuk Esterase: Negative / RBC: 0-2 /HPF / WBC 0-2   Sq Epi: x / Non Sq Epi: Occasional / Bacteria: Occasional        MEDICATIONS  (STANDING):  atorvastatin 10 milliGRAM(s) Oral at bedtime  chlorhexidine 2% Cloths 1 Application(s) Topical <User Schedule>  fluticasone propionate 50 MICROgram(s)/spray Nasal Spray 1 Spray(s) Both Nostrils two times a day  fluvoxaMINE 50 milliGRAM(s) Oral two times a day  lactated ringers. 1000 milliLiter(s) (100 mL/Hr) IV Continuous <Continuous>  pantoprazole Infusion 8 mG/Hr (10 mL/Hr) IV Continuous <Continuous>  risperiDONE   Tablet 2 milliGRAM(s) Oral two times a day  tamsulosin 0.4 milliGRAM(s) Oral at bedtime    MEDICATIONS  (PRN):  ondansetron Injectable 4 milliGRAM(s) IV Push every 6 hours PRN Nausea and/or Vomiting      REVIEW OF SYSTEMS:  unable to obtain    RADIOLOGY & ADDITIONAL TESTS:    Imaging Personally Reviewed:  [x] YES  [ ] NO    Consultant(s) Notes Reviewed:  [x] YES  [ ] NO    PHYSICAL EXAM:  T(C): 37.4 (20 @ 04:04), Max: 37.4 (20 @ 04:04)  HR: 105 (20 @ 09:00) (105 - 142)  BP: 123/87 (20 @ 09:00) (113/74 - 135/76)  RR: 24 (20 @ 09:00) (19 - 32)  SpO2: 98% (20 @ 09:00) (91% - 98%)    GENERAL: NAD, well-groomed, well-developed  HEAD:  Atraumatic, Normocephalic  EYES: EOMI, PERRLA, conjunctiva and sclera clear  ENMT: No tonsillar erythema, exudates, or enlargement; Moist mucous membranes, Good dentition, No lesions  NECK: Supple, No JVD, Normal thyroid  NERVOUS SYSTEM:  Awake and alert, moves all extremities  CHEST/LUNG: Clear to auscultation bilaterally  HEART: Regular rate and rhythm; No murmurs, rubs, or gallops  ABDOMEN: Soft, Nontender, Nondistended; Bowel sounds present  EXTREMITIES:  2+ Peripheral Pulses, No clubbing, cyanosis, or edema  LYMPH: No lymphadenopathy noted  SKIN: warm, well perfused    Care Discussed with Consultants/Other Providers [x] YES  [ ] NO

## 2020-03-04 NOTE — PROGRESS NOTE ADULT - SUBJECTIVE AND OBJECTIVE BOX
Critical Care PA      64M with profound MR/autism with aggression and agitated behavior, bipolar affective disorder, schizophrenia paranoid type, hx of DVT on Xarelto currently, BPH, GERD, hx of hematuria who presents with GI bleeding presents to ED w/ SOB and reported pale at SNF. While in ED,   patient then started to have active melena and coffee ground hematemesis. A STAT hgb was 11.6.  VSS  Started on IV PPI and admitted to SPCU for further management of GI bleed. s/p IUPRBC transfusion.     24hr Events: s/p EGD today showing gastritis, esophageal ulcer, and large hiatal hernia. Remains on protonix drip.      --- PMHx.---    Fall (on) (from) other stairs and steps, initial encounter  Mental retardation  Aggressiveness  Agitation  Nonverbal  Autism  Bipolar affective disorder  Schizophrenia, paranoid type  GERD (gastroesophageal reflux disease)  Hypertension  Hyperlipidemia  Hematuria             Review Of Systems: UTO as above     Previous Medical Record reviewed and case has been discussed with EICU.    --- Medications ---    atorvastatin 10 milliGRAM(s)  chlorhexidine 2% Cloths 1 Application(s)  fluticasone propionate 50 MICROgram(s)/spray Nasal [place](s)  fluvoxaMINE 50 milliGRAM(s)  lactated ringers. 1000 milliLiter(s)  lactated ringers. 1000 milliLiter(s)  ondansetron Injectable 4 milliGRAM(s) PRN  pantoprazole Infusion 8 mG/Hr  risperiDONE   Tablet 2 milliGRAM(s)  sucralfate suspension 1 Gram(s)  tamsulosin 0.4 milliGRAM(s)      DVT Prophylaxis : SCD's    Advanced Directives : full code     T(C): 37.1 (20 @ 19:42), Max: 37.4 (20 @ 04:04)  HR: 81 (20 @ 22:00)  BP: 126/70 (20 @ 22:00)  RR: 25 (20 @ 22:00)  SpO2: 98% (20 @ 22:00)  Wt(kg): --    --- Physical Exam ---    General: awake, responsive, nonverbal     HEENT:   Symmetric.    PULM: Clear to auscultation bilaterally    CVS: Regular rate and rhythm, no murmurs, rubs, or gallops    ABD: Soft, nondistended, nontender, normoactive bowel sounds, no masses    EXT: No edema, nontender    SKIN: Warm and well perfused, no rashes noted.    --- Labratories ---                           10.3   12.21 )-----------( 193      ( 04 Mar 2020 17:41 )             30.9    03-04    140  |  109<H>  |  42<H>  ----------------------------<  123<H>  4.7   |  24  |  0.83    Ca    7.9<L>      04 Mar 2020 10:09  Phos  2.8     03-04  Mg     1.8     03-04    TPro  4.9<L>  /  Alb  2.3<L>  /  TBili  0.3  /  DBili  x   /  AST  22  /  ALT  17  /  AlkPhos  66  03-04  PT/INR - ( 03 Mar 2020 21:11 )   PT: 19.1 sec;   INR: 1.69 ratio         PTT - ( 03 Mar 2020 21:11 )  PTT:41.3 secUrinalysis Basic - ( 04 Mar 2020 00:52 )    Color: Yellow / Appearance: Clear / S.015 / pH: x  Gluc: x / Ketone: Small  / Bili: Negative / Urobili: Negative mg/dL   Blood: x / Protein: 30 mg/dL / Nitrite: Negative   Leuk Esterase: Negative / RBC: 0-2 /HPF / WBC 0-2   Sq Epi: x / Non Sq Epi: Occasional / Bacteria: Occasional            Radiology  / Ultrasound : Critical Care PA      64M with profound MR/autism with aggression and agitated behavior, bipolar affective disorder, schizophrenia paranoid type, hx of DVT on Xarelto currently, BPH, GERD, hx of hematuria presents to ED w/ SOB and reported pale at SNF. While in ED,   patient then started to have active melena and coffee ground hematemesis. A STAT hgb was 11.6.  VSS  Started on IV PPI and admitted to SPCU for further management of GI bleed. s/p IUPRBC transfusion.     24hr Events: s/p EGD today showing gastritis, esophageal ulcer, and large hiatal hernia. Remains on protonix drip.      --- PMHx.---    Fall (on) (from) other stairs and steps, initial encounter  Mental retardation  Aggressiveness  Agitation  Nonverbal  Autism  Bipolar affective disorder  Schizophrenia, paranoid type  GERD (gastroesophageal reflux disease)  Hypertension  Hyperlipidemia  Hematuria             Review Of Systems: UTO as above     Previous Medical Record reviewed and case has been discussed with EICU.    --- Medications ---    atorvastatin 10 milliGRAM(s)  chlorhexidine 2% Cloths 1 Application(s)  fluticasone propionate 50 MICROgram(s)/spray Nasal [place] 1 [place](s)  fluvoxaMINE 50 milliGRAM(s)  lactated ringers. 1000 milliLiter(s)  lactated ringers. 1000 milliLiter(s)  ondansetron Injectable 4 milliGRAM(s) PRN  pantoprazole Infusion 8 mG/Hr  risperiDONE   Tablet 2 milliGRAM(s)  sucralfate suspension 1 Gram(s)  tamsulosin 0.4 milliGRAM(s)      DVT Prophylaxis : SCD's    Advanced Directives : full code     T(C): 37.1 (20 @ 19:42), Max: 37.4 (20 @ 04:04)  HR: 81 (20 @ 22:00)  BP: 126/70 (20 @ 22:00)  RR: 25 (20 @ 22:00)  SpO2: 98% (20 @ 22:00)  Wt(kg): --    --- Physical Exam ---    General: awake, responsive, nonverbal     HEENT:   Symmetric.    PULM: Clear to auscultation bilaterally    CVS: Regular rate and rhythm, no murmurs, rubs, or gallops    ABD: Soft, nondistended, nontender, normoactive bowel sounds, no masses    EXT: No edema, nontender    SKIN: Warm and well perfused, no rashes noted.    --- Labratories ---                           10.3   12.21 )-----------( 193      ( 04 Mar 2020 17:41 )             30.9    03-04    140  |  109<H>  |  42<H>  ----------------------------<  123<H>  4.7   |  24  |  0.83    Ca    7.9<L>      04 Mar 2020 10:09  Phos  2.8     03-04  Mg     1.8     03-04    TPro  4.9<L>  /  Alb  2.3<L>  /  TBili  0.3  /  DBili  x   /  AST  22  /  ALT  17  /  AlkPhos  66  03-04  PT/INR - ( 03 Mar 2020 21:11 )   PT: 19.1 sec;   INR: 1.69 ratio         PTT - ( 03 Mar 2020 21:11 )  PTT:41.3 secUrinalysis Basic - ( 04 Mar 2020 00:52 )    Color: Yellow / Appearance: Clear / S.015 / pH: x  Gluc: x / Ketone: Small  / Bili: Negative / Urobili: Negative mg/dL   Blood: x / Protein: 30 mg/dL / Nitrite: Negative   Leuk Esterase: Negative / RBC: 0-2 /HPF / WBC 0-2   Sq Epi: x / Non Sq Epi: Occasional / Bacteria: Occasional            Radiology  / Ultrasound :

## 2020-03-04 NOTE — PROVIDER CONTACT NOTE (EICU) - ACTION/TREATMENT ORDERED:
Care discussed with  eICU attending. If any additional assistance in care/management of patient required by bedside team, provider/RN/family member advised to push "e-alert" button in patient's room, and details will be discussed at that time.

## 2020-03-04 NOTE — CONSULT NOTE ADULT - SUBJECTIVE AND OBJECTIVE BOX
REASON FOR CONSULT: GI Bleed    Chief Complaint    HPI:64 year old male with a pmhx of DVT +left femoral/popliteal on xarelto, MR, autism, non-verbal, bipolar disorder, schizophrenia, gerd, htn, hld, hematuria, bph who presents from group home for shortness of breath. Per aide at bedside, they noticed patient was having some mild shortness of breath along with an enlarged stomach. Nursing home staff then took vitals which revealed HR elevated to 130s and was then sent to [facility] ED. Upon presentation, patient noted to have WBC 20K, Hg 12.7, tachycardia to 130s, /82. Upon workup, patient noted to have several episodes of hematemesis described as coffee ground in color. Also now having maroon colored bowel movements with minimal brighter clots mixed in. Sent for CT angio. Was fecal occult positive. Of note patient admitted to ICU in January 2019 for ABLA/GI bleed, EGD done at that time significant for gastric and duodenal ulcers. Patient started back on xarelto and discharged back to group home. Patient seen and examined at beside is nonverbal.      PAST MEDICAL & SURGICAL HISTORY:  Mental retardation  Aggressiveness  Agitation  Nonverbal  Autism  Bipolar affective disorder  Schizophrenia, paranoid type  GERD (gastroesophageal reflux disease)  Hypertension  Hyperlipidemia  Hematuria  BPH (benign prostatic hypertrophy) with urinary obstruction  Fracture of left malleolus: repaired in 7/2018    Allergies    No Known Allergies    Intolerances      FAMILY HISTORY:  No pertinent family history in first degree relatives  Family history unknown      Review of Systems:  CONSTITUTIONAL: No fever, chills, or fatigue  EYES: No eye pain, visual disturbances, or discharge  ENMT:  No difficulty hearing, tinnitus, vertigo; No sinus or throat pain  NECK: No pain or stiffness  RESPIRATORY: No cough, wheezing, chills or hemoptysis; No shortness of breath  CARDIOVASCULAR: No chest pain, palpitations, dizziness, or leg swelling  GASTROINTESTINAL: No abdominal or epigastric pain. No nausea, vomiting, or hematemesis; No diarrhea or constipation. No melena or hematochezia.  GENITOURINARY: No dysuria, frequency, hematuria, or incontinence  NEUROLOGICAL: No headaches, memory loss, loss of strength, numbness, or tremors  SKIN: No itching, burning, rashes, or lesions   MUSCULOSKELETAL: No joint pain or swelling; No muscle, back, or extremity pain  PSYCHIATRIC: No depression, anxiety, mood swings, or difficulty sleeping      Medications:    tamsulosin 0.4 milliGRAM(s) Oral at bedtime      fluvoxaMINE 50 milliGRAM(s) Oral two times a day  risperiDONE   Tablet 2 milliGRAM(s) Oral two times a day        pantoprazole Infusion 8 mG/Hr IV Continuous <Continuous>      atorvastatin 10 milliGRAM(s) Oral at bedtime    potassium chloride  10 mEq/100 mL IVPB 10 milliEquivalent(s) IV Intermittent every 1 hour      chlorhexidine 2% Cloths 1 Application(s) Topical <User Schedule>  fluticasone propionate 50 MICROgram(s)/spray Nasal Spray 1 Spray(s) Both Nostrils two times a day        ICU Vital Signs Last 24 Hrs  T(C): 36.4 (03 Mar 2020 22:45), Max: 36.4 (03 Mar 2020 22:45)  T(F): 97.5 (03 Mar 2020 22:45), Max: 97.5 (03 Mar 2020 22:45)  HR: 116 (03 Mar 2020 23:30) (114 - 142)  BP: 115/72 (03 Mar 2020 23:30) (115/72 - 126/74)  RR: 20 (03 Mar 2020 23:30) (20 - 20)  SpO2: 94% (03 Mar 2020 23:30) (94% - 94%)        LABS:                        11.6   20.39 )-----------( 233      ( 03 Mar 2020 23:44 )             34.5     03-03    136  |  102  |  42<H>  ----------------------------<  201<H>  3.1<L>   |  17<L>  |  1.13    Ca    9.6      03 Mar 2020 21:11    TPro  6.9  /  Alb  3.3  /  TBili  0.2  /  DBili  x   /  AST  20  /  ALT  21  /  AlkPhos  94  03-03      CARDIAC MARKERS ( 03 Mar 2020 21:11 )  .346 ng/mL / x     / x     / x     / x          CAPILLARY BLOOD GLUCOSE        PT/INR - ( 03 Mar 2020 21:11 )   PT: 19.1 sec;   INR: 1.69 ratio         PTT - ( 03 Mar 2020 21:11 )  PTT:41.3 sec    CULTURES:      Physical Examination:  Physical exam as per bedside MD (direct physical examination could not be performed because the visit was provided via Telemedicine):       RADIOLOGY: < from: CT Abdomen and Pelvis w/ IV Cont (03.03.20 @ 22:30) >  IMPRESSION:     Few mildly dilated loops of small bowel without discrete transition. Colon is fluid and air distended. Stomach is distended with fluid and debris.    < end of copied text >      Assessment- GI Bleed     coagulopathy     history of gastric and duodenal ulcers     elevate troponin     history of DVT    Plan-     Admit to ICU     Protonix drip     GI evaluation     serial hct     aspiration precautions    CRITICAL CARE TIME SPENT: 45    This visit was provided via telemedicine. Patient was located at     [facility]    Provider was located at [employer] [facility], [place] for the visit.

## 2020-03-04 NOTE — H&P ADULT - NSHPLABSRESULTS_GEN_ALL_CORE
LABS:                        11.6<L>  20.39<H> )-----------( 233      ( 03 Mar 2020 23:44 )             34.5<L>                        12.7<L>  20.05<H> )-----------( 249      ( 03 Mar 2020 21:11 )             37.5<L>    136    |  102    |  42<H>  ----------------------------<  201<H>    03 Mar 2020 21:11  3.1<L>   |  17<L>  |  1.13         Ca 9.6           03 Mar 2020 21:11        TPro  6.9    /  Alb  3.3    /  TBili  0.2    /  DBili  x      /  AST  20     /  ALT  21     /  AlkPhos  94     03 Mar 2020 21:11    PT/INR - ( 03 Mar 2020 21:11 )   PT: 19.1<H>;   INR: 1.69<H>         PTT - ( 03 Mar 2020 21:11 )  PTT:41.3<H>    Troponin trend:  .346  03-03 @ 21:11    EKG: sinus tachycardia  Radiology:  < from: CT Abdomen and Pelvis w/ IV Cont (03.03.20 @ 22:30) >    IMPRESSION:     Few mildly dilated loops of small bowel without discrete transition. Colon is fluid and air distended. Stomach is distended with fluid and debris.    Trace ascites.    < end of copied text >

## 2020-03-05 DIAGNOSIS — R79.89 OTHER SPECIFIED ABNORMAL FINDINGS OF BLOOD CHEMISTRY: ICD-10-CM

## 2020-03-05 DIAGNOSIS — E78.5 HYPERLIPIDEMIA, UNSPECIFIED: ICD-10-CM

## 2020-03-05 DIAGNOSIS — K27.4 CHRONIC OR UNSPECIFIED PEPTIC ULCER, SITE UNSPECIFIED, WITH HEMORRHAGE: ICD-10-CM

## 2020-03-05 LAB
ALBUMIN SERPL ELPH-MCNC: 2.3 G/DL — LOW (ref 3.3–5)
ALP SERPL-CCNC: 62 U/L — SIGNIFICANT CHANGE UP (ref 30–120)
ALT FLD-CCNC: 19 U/L DA — SIGNIFICANT CHANGE UP (ref 10–60)
ANION GAP SERPL CALC-SCNC: 8 MMOL/L — SIGNIFICANT CHANGE UP (ref 5–17)
AST SERPL-CCNC: 22 U/L — SIGNIFICANT CHANGE UP (ref 10–40)
BASOPHILS # BLD AUTO: 0.04 K/UL — SIGNIFICANT CHANGE UP (ref 0–0.2)
BASOPHILS NFR BLD AUTO: 0.4 % — SIGNIFICANT CHANGE UP (ref 0–2)
BILIRUB SERPL-MCNC: 0.2 MG/DL — SIGNIFICANT CHANGE UP (ref 0.2–1.2)
BUN SERPL-MCNC: 17 MG/DL — SIGNIFICANT CHANGE UP (ref 7–23)
CALCIUM SERPL-MCNC: 8.8 MG/DL — SIGNIFICANT CHANGE UP (ref 8.4–10.5)
CHLORIDE SERPL-SCNC: 112 MMOL/L — HIGH (ref 96–108)
CO2 SERPL-SCNC: 27 MMOL/L — SIGNIFICANT CHANGE UP (ref 22–31)
CREAT SERPL-MCNC: 0.76 MG/DL — SIGNIFICANT CHANGE UP (ref 0.5–1.3)
EOSINOPHIL # BLD AUTO: 0.29 K/UL — SIGNIFICANT CHANGE UP (ref 0–0.5)
EOSINOPHIL NFR BLD AUTO: 3.1 % — SIGNIFICANT CHANGE UP (ref 0–6)
GLUCOSE SERPL-MCNC: 116 MG/DL — HIGH (ref 70–99)
HCT VFR BLD CALC: 28.2 % — LOW (ref 39–50)
HCT VFR BLD CALC: 28.3 % — LOW (ref 39–50)
HGB BLD-MCNC: 9.4 G/DL — LOW (ref 13–17)
HGB BLD-MCNC: 9.6 G/DL — LOW (ref 13–17)
IMM GRANULOCYTES NFR BLD AUTO: 0.2 % — SIGNIFICANT CHANGE UP (ref 0–1.5)
LYMPHOCYTES # BLD AUTO: 1.55 K/UL — SIGNIFICANT CHANGE UP (ref 1–3.3)
LYMPHOCYTES # BLD AUTO: 16.5 % — SIGNIFICANT CHANGE UP (ref 13–44)
MAGNESIUM SERPL-MCNC: 1.7 MG/DL — SIGNIFICANT CHANGE UP (ref 1.6–2.6)
MCHC RBC-ENTMCNC: 29.8 PG — SIGNIFICANT CHANGE UP (ref 27–34)
MCHC RBC-ENTMCNC: 30.3 PG — SIGNIFICANT CHANGE UP (ref 27–34)
MCHC RBC-ENTMCNC: 33.2 GM/DL — SIGNIFICANT CHANGE UP (ref 32–36)
MCHC RBC-ENTMCNC: 34 GM/DL — SIGNIFICANT CHANGE UP (ref 32–36)
MCV RBC AUTO: 89 FL — SIGNIFICANT CHANGE UP (ref 80–100)
MCV RBC AUTO: 89.8 FL — SIGNIFICANT CHANGE UP (ref 80–100)
MONOCYTES # BLD AUTO: 0.78 K/UL — SIGNIFICANT CHANGE UP (ref 0–0.9)
MONOCYTES NFR BLD AUTO: 8.3 % — SIGNIFICANT CHANGE UP (ref 2–14)
NEUTROPHILS # BLD AUTO: 6.71 K/UL — SIGNIFICANT CHANGE UP (ref 1.8–7.4)
NEUTROPHILS NFR BLD AUTO: 71.5 % — SIGNIFICANT CHANGE UP (ref 43–77)
NRBC # BLD: 0 /100 WBCS — SIGNIFICANT CHANGE UP (ref 0–0)
NRBC # BLD: 0 /100 WBCS — SIGNIFICANT CHANGE UP (ref 0–0)
NT-PROBNP SERPL-SCNC: 240 PG/ML — HIGH (ref 0–125)
PHOSPHATE SERPL-MCNC: 1.7 MG/DL — LOW (ref 2.5–4.5)
PLATELET # BLD AUTO: 171 K/UL — SIGNIFICANT CHANGE UP (ref 150–400)
PLATELET # BLD AUTO: 192 K/UL — SIGNIFICANT CHANGE UP (ref 150–400)
POTASSIUM SERPL-MCNC: 3.5 MMOL/L — SIGNIFICANT CHANGE UP (ref 3.5–5.3)
POTASSIUM SERPL-SCNC: 3.5 MMOL/L — SIGNIFICANT CHANGE UP (ref 3.5–5.3)
PROT SERPL-MCNC: 4.9 G/DL — LOW (ref 6–8.3)
RBC # BLD: 3.15 M/UL — LOW (ref 4.2–5.8)
RBC # BLD: 3.17 M/UL — LOW (ref 4.2–5.8)
RBC # FLD: 13.2 % — SIGNIFICANT CHANGE UP (ref 10.3–14.5)
RBC # FLD: 13.3 % — SIGNIFICANT CHANGE UP (ref 10.3–14.5)
SODIUM SERPL-SCNC: 147 MMOL/L — HIGH (ref 135–145)
WBC # BLD: 7.33 K/UL — SIGNIFICANT CHANGE UP (ref 3.8–10.5)
WBC # BLD: 9.39 K/UL — SIGNIFICANT CHANGE UP (ref 3.8–10.5)
WBC # FLD AUTO: 7.33 K/UL — SIGNIFICANT CHANGE UP (ref 3.8–10.5)
WBC # FLD AUTO: 9.39 K/UL — SIGNIFICANT CHANGE UP (ref 3.8–10.5)

## 2020-03-05 PROCEDURE — 99222 1ST HOSP IP/OBS MODERATE 55: CPT

## 2020-03-05 PROCEDURE — 99233 SBSQ HOSP IP/OBS HIGH 50: CPT

## 2020-03-05 RX ORDER — MAGNESIUM SULFATE 500 MG/ML
2 VIAL (ML) INJECTION ONCE
Refills: 0 | Status: COMPLETED | OUTPATIENT
Start: 2020-03-05 | End: 2020-03-05

## 2020-03-05 RX ORDER — METOPROLOL TARTRATE 50 MG
12.5 TABLET ORAL DAILY
Refills: 0 | Status: DISCONTINUED | OUTPATIENT
Start: 2020-03-05 | End: 2020-03-06

## 2020-03-05 RX ORDER — POTASSIUM PHOSPHATE, MONOBASIC POTASSIUM PHOSPHATE, DIBASIC 236; 224 MG/ML; MG/ML
30 INJECTION, SOLUTION INTRAVENOUS ONCE
Refills: 0 | Status: COMPLETED | OUTPATIENT
Start: 2020-03-05 | End: 2020-03-05

## 2020-03-05 RX ADMIN — Medication 12.5 MILLIGRAM(S): at 12:08

## 2020-03-05 RX ADMIN — TAMSULOSIN HYDROCHLORIDE 0.4 MILLIGRAM(S): 0.4 CAPSULE ORAL at 21:21

## 2020-03-05 RX ADMIN — Medication 1 SPRAY(S): at 06:28

## 2020-03-05 RX ADMIN — Medication 1 GRAM(S): at 23:27

## 2020-03-05 RX ADMIN — Medication 1 GRAM(S): at 06:28

## 2020-03-05 RX ADMIN — RISPERIDONE 2 MILLIGRAM(S): 4 TABLET ORAL at 17:32

## 2020-03-05 RX ADMIN — FLUVOXAMINE MALEATE 50 MILLIGRAM(S): 25 TABLET ORAL at 06:27

## 2020-03-05 RX ADMIN — RISPERIDONE 2 MILLIGRAM(S): 4 TABLET ORAL at 06:28

## 2020-03-05 RX ADMIN — Medication 1 GRAM(S): at 17:32

## 2020-03-05 RX ADMIN — ATORVASTATIN CALCIUM 10 MILLIGRAM(S): 80 TABLET, FILM COATED ORAL at 21:21

## 2020-03-05 RX ADMIN — CHLORHEXIDINE GLUCONATE 1 APPLICATION(S): 213 SOLUTION TOPICAL at 06:29

## 2020-03-05 RX ADMIN — Medication 1 GRAM(S): at 12:08

## 2020-03-05 RX ADMIN — FLUVOXAMINE MALEATE 50 MILLIGRAM(S): 25 TABLET ORAL at 17:32

## 2020-03-05 RX ADMIN — SODIUM CHLORIDE 75 MILLILITER(S): 9 INJECTION, SOLUTION INTRAVENOUS at 04:09

## 2020-03-05 RX ADMIN — PANTOPRAZOLE SODIUM 10 MG/HR: 20 TABLET, DELAYED RELEASE ORAL at 08:29

## 2020-03-05 RX ADMIN — Medication 1 SPRAY(S): at 17:32

## 2020-03-05 RX ADMIN — Medication 50 GRAM(S): at 08:29

## 2020-03-05 RX ADMIN — PANTOPRAZOLE SODIUM 10 MG/HR: 20 TABLET, DELAYED RELEASE ORAL at 19:54

## 2020-03-05 RX ADMIN — POTASSIUM PHOSPHATE, MONOBASIC POTASSIUM PHOSPHATE, DIBASIC 83.33 MILLIMOLE(S): 236; 224 INJECTION, SOLUTION INTRAVENOUS at 09:19

## 2020-03-05 NOTE — PROGRESS NOTE ADULT - SUBJECTIVE AND OBJECTIVE BOX
HPI: 63 y/o M w/ omh of MR, autism non-verbal, bipolar, schizophrenia, gerd, htn, hld, bph, L. femoral/popliteal DVT (on xarelto), presented to [facility] on 3/3/2020 by group home staff for sob. Pt was found to be tacycardic to 130s, Wbc of 20, hemoglobin of 12.7. Pt noted to have coffee ground hematemesis and gisel color BM with blood clots. Pt underwent a CTA w/ no acute finding and admitted to the MICU for further tx/evaulation.    24 hour events: Pt s/p EGD yesterday noted to have gastritis, esophageal ulcer and large hjoiatal hernia w/out any signs of active bleeding. Since pt has no longer had any further episodes of melena or coffee ground emesis. No major overnight events.     Review of Systems: Unable to obtain ROS    T(F): 97.6 (20 @ 08:15), Max: 98.9 (20 @ 17:36)  HR: 97 (20 @ 10:00) (78 - 105)  BP: 116/70 (20 @ 10:00) (101/54 - 142/71)  RR: 22 (20 @ 10:00) (15 - 28)  SpO2: 95% (20 @ 10:00) (95% - 100%)  Wt(kg): --      20 @ 07:01  -  20 @ 07:00  --------------------------------------------------------  IN: 2470 mL / OUT: 4 mL / NET: 2466 mL    20 @ 07:01  -  20 @ 12:08  --------------------------------------------------------  IN: 1036.6 mL / OUT: 0 mL / NET: 1036.6 mL        CAPILLARY BLOOD GLUCOSE      POCT Blood Glucose.: 105 mg/dL (04 Mar 2020 18:25)      I&O's Summary    04 Mar 2020 07:01  -  05 Mar 2020 07:00  --------------------------------------------------------  IN: 2470 mL / OUT: 4 mL / NET: 2466 mL    05 Mar 2020 07:01  -  05 Mar 2020 12:08  --------------------------------------------------------  IN: 1036.6 mL / OUT: 0 mL / NET: 1036.6 mL        Physical Exam:   Gen: Comfortable in bed in NAD  Neuro: awake and alert   HEENT: PERRL  Resp: CTA b/l  CVS: +RRR  Abd: BSx4, soft, NT w/ mild distention  Ext: no edema  Skin: warm/dry      Meds:    metoprolol succinate ER Oral  tamsulosin Oral    atorvastatin Oral      fluvoxaMINE Oral  ondansetron Injectable IV Push PRN  risperiDONE   Tablet Oral        pantoprazole Infusion IV Continuous  sucralfate suspension Oral          chlorhexidine 2% Cloths Topical  fluticasone propionate 50 MICROgram(s)/spray Nasal Spray Both Nostrils                              9.6    9.39  )-----------( 171      ( 05 Mar 2020 06:23 )             28.2       03-05    147<H>  |  112<H>  |  17  ----------------------------<  116<H>  3.5   |  27  |  0.76    Ca    8.8      05 Mar 2020 06:23  Phos  1.7     03-05  Mg     1.7     03-05    TPro  4.9<L>  /  Alb  2.3<L>  /  TBili  0.2  /  DBili  x   /  AST  22  /  ALT  19  /  AlkPhos  62  03-05      CARDIAC MARKERS ( 04 Mar 2020 10:09 )  .572 ng/mL / x     / x     / x     / x      CARDIAC MARKERS ( 04 Mar 2020 06:38 )  .722 ng/mL / x     / x     / x     / x      CARDIAC MARKERS ( 03 Mar 2020 21:11 )  .346 ng/mL / x     / x     / x     / x          PT/INR - ( 03 Mar 2020 21:11 )   PT: 19.1 sec;   INR: 1.69 ratio         PTT - ( 03 Mar 2020 21:11 )  PTT:41.3 sec  Urinalysis Basic - ( 04 Mar 2020 00:52 )    Color: Yellow / Appearance: Clear / S.015 / pH: x  Gluc: x / Ketone: Small  / Bili: Negative / Urobili: Negative mg/dL   Blood: x / Protein: 30 mg/dL / Nitrite: Negative   Leuk Esterase: Negative / RBC: 0-2 /HPF / WBC 0-2   Sq Epi: x / Non Sq Epi: Occasional / Bacteria: Occasional    Radiology:    < from: US Transthoracic Echocardiogram w/Doppler Complete (20 @ 08:47) >    EXAM:  US TTE W DOPPLER COMPLETE                                  PROCEDURE DATE:  2020          INTERPRETATION:    Indication: Elevated troponin    Technician: Nathan Ross    Study Quality: Difficult study, patient is tachycardic duringthe study    Height 5 feet 10 inches, weight 190 pounds, blood pressure 120/78    Measurements: DeGroote size 3.1 cm, left atrial size 3.1 cm, right atrial size 2.8 cm, right ventricular size 2.2 cm, left ventricular end-diastolic diameter 4.5 cm, left ventricular end-systolic diameter 2.1 cm, septal wall thickness 1.2 cm, posterior thickness 1.1 cm, aortic velocity 1.6 m/s, mitral E velocity 0.8 m/s, ejection fraction more than 70% ,patient is tachycardic during the study    Mitral Valve: Appears some mildly thickened with normal opening.  Aortic Valve: Appears mildly thickened with normal cusp excursion  Left Atrium: Normal size  Left Ventricle: Normal size, mild left ventricular hypertrophy, hyperdynamic overall left ventricular systolic function, all segments were not well-visualized due to poor endocardial visualization. There is stage I diastolic dysfunction  Pericardium: No pericardial effusion  Tricuspid Valve: Appears normal with normal opening. Mild tricuspid regurgitation with estimated right ventricular systolic pressure 37 mmHg  Pulmonic Valve: Not well-visualized but appears to be normal  Right Ventricle: Grossly normal size with normal right ventricular systolic function  Right Atrium: Normal size    SUMMARY:  1. left ventricular hypertrophy, hyperdynamic overall left ventricular systolic function, mild diastolic dysfunction  2. mild tricuspid regurgitation with estimated right ventricular systolic pressure 37 mmHg        [facility]  This document has been electronically signed. Mar  5 2020  7:42AM        < end of copied text >    < from: CT Abdomen and Pelvis w/ IV Cont (20 @ 22:30) >    EXAM:  CT ABDOMEN AND PELVIS IC                                  PROCEDURE DATE:  2020          INTERPRETATION:  CLINICAL INFORMATION: Abdominal pain with dark stools. Leukocytosis.    COMPARISON: CT abdomen/pelvis of 2019.    PROCEDURE:  CT of the Abdomen and Pelvis was performed with intravenous contrast.   Intravenous contrast: 92 ml Omnipaque 350. 8 ml discarded.  Oral contrast: None.  Sagittal and coronal reformats were performed.    FINDINGS:    LOWER CHEST: Within normal limits.    LIVER: Within normal limits.  BILE DUCTS: Normal caliber.  GALLBLADDER: Within normal limits.  SPLEEN: Within normal limits.  PANCREAS: Within normal limits.  ADRENALS: Within normal limits.  KIDNEYS/URETERS: Within normal limits.    BLADDER: Within normal limits.  REPRODUCTIVE ORGANS: Prostate within normal limits.    BOWEL: Couple nonspecific mildly dilated loops of fluid-filled small bowel within the left upper and right lower quadrants without discrete transition. Colon is air and fluiddistended. Moderate hiatal hernia. Stomach and adrenal hernia are distended with fluid and debris. Appendix is normal.  PERITONEUM: Trace ascites within the right lower quadrant.  VESSELS: Within normal limits.  RETROPERITONEUM/LYMPH NODES: No lymphadenopathy.    ABDOMINAL WALL: Within normal limits.  BONES: Multilevel degenerative changes of the spine.    IMPRESSION:     Few mildly dilated loops of small bowel without discrete transition. Colon is fluid and air distended. Stomach is distended with fluid and debris.    Trace ascites.      BENJAMIN SCHILLING M.D., ATTENDING RADIOLOGIST  This document has been electronically signed. Mar  3 2020 11:36PM    < end of copied text >      CENTRAL LINE: N  CORDOVA: N  A-LINE: N    GLOBAL ISSUE/BEST PRACTICE:  Analgesia: N  Sedation: N  CAM-ICU: n/a  HOB elevation: Y  Stress ulcer prophylaxis: Y  VTE prophylaxis: Y (SCD)  Glycemic control: Y  Nutrition: N    CODE STATUS: FULL CODE    CRITICAL CARE TIME SPENT:  (Assessing presenting problems of acute illness, which pose high probability of life threatening deterioration or end organ damage/dysfunction, as well as medical decision making including initiating plan of care, reviewing data, reviewing radiologic exams, discussing with multidisciplinary team,  discussing goals of care with patient/family, and writing this note.  Non-inclusive of procedures performed)

## 2020-03-05 NOTE — PROGRESS NOTE ADULT - SUBJECTIVE AND OBJECTIVE BOX
Date/Time Patient Seen:  		  Referring MD:   Data Reviewed	       Patient is a 64y old  Male who presents with a chief complaint of GI bleed (04 Mar 2020 22:43)      Subjective/HPI     PAST MEDICAL & SURGICAL HISTORY:  Fall (on) (from) other stairs and steps, initial encounter  Mental retardation  Aggressiveness  Agitation  Nonverbal  Autism  Bipolar affective disorder  Schizophrenia, paranoid type  GERD (gastroesophageal reflux disease)  Hypertension  Hyperlipidemia  Hematuria  BPH (benign prostatic hypertrophy) with urinary obstruction  Fracture of left malleolus: repaired in 7/2018  No significant past surgical history        Medication list         MEDICATIONS  (STANDING):  atorvastatin 10 milliGRAM(s) Oral at bedtime  chlorhexidine 2% Cloths 1 Application(s) Topical <User Schedule>  fluticasone propionate 50 MICROgram(s)/spray Nasal Spray 1 Spray(s) Both Nostrils two times a day  fluvoxaMINE 50 milliGRAM(s) Oral two times a day  lactated ringers. 1000 milliLiter(s) (75 mL/Hr) IV Continuous <Continuous>  lactated ringers. 1000 milliLiter(s) (100 mL/Hr) IV Continuous <Continuous>  pantoprazole Infusion 8 mG/Hr (10 mL/Hr) IV Continuous <Continuous>  risperiDONE   Tablet 2 milliGRAM(s) Oral two times a day  sucralfate suspension 1 Gram(s) Oral every 6 hours  tamsulosin 0.4 milliGRAM(s) Oral at bedtime    MEDICATIONS  (PRN):  ondansetron Injectable 4 milliGRAM(s) IV Push every 6 hours PRN Nausea and/or Vomiting         Vitals log        ICU Vital Signs Last 24 Hrs  T(C): 36.9 (05 Mar 2020 04:20), Max: 37.2 (04 Mar 2020 17:36)  T(F): 98.4 (05 Mar 2020 04:20), Max: 98.9 (04 Mar 2020 17:36)  HR: 78 (05 Mar 2020 06:00) (78 - 110)  BP: 113/75 (05 Mar 2020 06:00) (101/54 - 142/71)  BP(mean): 88 (05 Mar 2020 06:00) (68 - 98)  ABP: --  ABP(mean): --  RR: 15 (05 Mar 2020 06:00) (15 - 28)  SpO2: 97% (05 Mar 2020 06:00) (96% - 100%)           Input and Output:  I&O's Detail    04 Mar 2020 07:01  -  05 Mar 2020 07:00  --------------------------------------------------------  IN:    IV PiggyBack: 250 mL    lactated ringers.: 800 mL    lactated ringers.: 675 mL    Oral Fluid: 240 mL    Packed Red Blood Cells: 275 mL    pantoprazole Infusion: 230 mL  Total IN: 2470 mL    OUT:    Voided: 4 mL  Total OUT: 4 mL    Total NET: 2466 mL          Lab Data                        9.6    9.39  )-----------( 171      ( 05 Mar 2020 06:23 )             28.2     03-05    147<H>  |  112<H>  |  17  ----------------------------<  116<H>  3.5   |  27  |  0.76    Ca    8.8      05 Mar 2020 06:23  Phos  1.7     03-05  Mg     1.7     03-05    TPro  4.9<L>  /  Alb  2.3<L>  /  TBili  0.2  /  DBili  x   /  AST  22  /  ALT  19  /  AlkPhos  62  03-05      CARDIAC MARKERS ( 04 Mar 2020 10:09 )  .572 ng/mL / x     / x     / x     / x      CARDIAC MARKERS ( 04 Mar 2020 06:38 )  .722 ng/mL / x     / x     / x     / x      CARDIAC MARKERS ( 03 Mar 2020 21:11 )  .346 ng/mL / x     / x     / x     / x            Review of Systems	      Objective     Physical Examination    heart s1s2  lung dc BS  abd soft  head nc  on o2 support      Pertinent Lab findings & Imaging      Tegan:  NO   Adequate UO     I&O's Detail    04 Mar 2020 07:01  -  05 Mar 2020 07:00  --------------------------------------------------------  IN:    IV PiggyBack: 250 mL    lactated ringers.: 800 mL    lactated ringers.: 675 mL    Oral Fluid: 240 mL    Packed Red Blood Cells: 275 mL    pantoprazole Infusion: 230 mL  Total IN: 2470 mL    OUT:    Voided: 4 mL  Total OUT: 4 mL    Total NET: 2466 mL               Discussed with:     Cultures:	        Radiology

## 2020-03-05 NOTE — CONSULT NOTE ADULT - SUBJECTIVE AND OBJECTIVE BOX
Patient is non verbal    HPI:  64M with profound MR/autism with aggression and agitated behavior, bipolar affective disorder, schizophrenia paranoid type, hx of DVT on Xarelto currently, BPH, GERD, hx of hematuria who presents with GI bleeding.  Patient is unable to provide any history given his profound MR and therefore collateral information obtain from notes and charts.  Per notes, patient was complaining of abdominal pain was starting to look pale at the facility.  Staff unsure of any dark stools or hematemesis.  No known nausea/vomiting, fevers/chills.  Patient is on xarelto and iron.  In the ED, patient was noted to be tachycardic to 142 but had a stable BP at 126/74.  Initial hgb was 12.7.  During the ED course, patient then started to have active melena and hematemesis.  CTA was done that showed few mildly dilated loops of bowel.  A STAT hgb was done and was 11.6.  Patient's vitals remained stable with a HR in the 110s.  Started on IV PPI.  Labs also showed a lactate 3.6 with an elevated troponin 0.346.  Patient is being admitted to [facility] for further management of GI bleed. (04 Mar 2020 00:17)    3/5/20 Patient had  mild elevated troponin , had hyperdynamic left ventricular systolic function ,  patient tolerated endoscopy well  , patient vitals stable comfortable , non communicative       PAST MEDICAL & SURGICAL HISTORY:  Mental retardation  Aggressiveness  Agitation  Nonverbal  Autism  Bipolar affective disorder  Schizophrenia, paranoid type  GERD (gastroesophageal reflux disease)  Hypertension  Hyperlipidemia  Hematuria  BPH (benign prostatic hypertrophy) with urinary obstruction  Fracture of left malleolus: repaired in 7/2018      Allergies    No Known Allergies    Intolerances        SOCIAL HISTORY: non smoker     FAMILY HISTORY:  No pertinent family history in first degree relatives      MEDICATIONS:Home Medications:  Carbamide Peroxide 6.5% otic solution: 5 drop(s) to each affected ear 2 times a day (03 Mar 2020 23:24)  chlorhexadine rinse: 0.5 dose(s) orally 2 times a day Swish and spit out (03 Mar 2020 22:34)  Daily Multi oral tablet: 1 tab(s) orally once a day (03 Mar 2020 22:34)  Docusil 100 mg oral capsule: 1 cap(s) orally 2 times a day (03 Mar 2020 23:24)  ferrous sulfate 325 mg (65 mg elemental iron) oral delayed release tablet: 1 tab(s) orally once a day (03 Mar 2020 23:24)  Flomax 0.4 mg oral capsule: 1 cap(s) orally once a day (03 Mar 2020 22:34)  fluticasone 50 mcg/inh nasal spray: 1 spray(s) nasal 2 times a day (03 Mar 2020 22:34)  fluvoxaMINE extended release: 50 milligram(s) orally 2 times a day (03 Mar 2020 23:24)  loratadine 10 mg oral tablet: 1 tab(s) orally once a day (03 Mar 2020 22:34)  niacin 1000 mg oral tablet, extended release: 1 tab(s) orally once a day (at bedtime) (03 Mar 2020 22:34)  pantoprazole 40 mg oral delayed release tablet: 1 tab(s) orally once a day (03 Mar 2020 23:24)  risperiDONE 2 mg oral tablet: 1 tab(s) orally 2 times a day (03 Mar 2020 22:34)  Tylenol 325 mg oral tablet: 2 tab(s) orally every 6 hours, As Needed (03 Mar 2020 22:34)  Vitamin B12 100 mcg oral tablet: 1 tab(s) orally once a day (03 Mar 2020 23:24)  Xarelto 10 mg oral tablet: 1 tab(s) orally once a day (03 Mar 2020 23:24)    MEDICATIONS  (STANDING):  atorvastatin 10 milliGRAM(s) Oral at bedtime  chlorhexidine 2% Cloths 1 Application(s) Topical <User Schedule>  fluticasone propionate 50 MICROgram(s)/spray Nasal Spray 1 Spray(s) Both Nostrils two times a day  fluvoxaMINE 50 milliGRAM(s) Oral two times a day  lactated ringers. 1000 milliLiter(s) (75 mL/Hr) IV Continuous <Continuous>  magnesium sulfate  IVPB 2 Gram(s) IV Intermittent once  pantoprazole Infusion 8 mG/Hr (10 mL/Hr) IV Continuous <Continuous>  potassium phosphate IVPB 30 milliMole(s) IV Intermittent once  risperiDONE   Tablet 2 milliGRAM(s) Oral two times a day  sucralfate suspension 1 Gram(s) Oral every 6 hours  tamsulosin 0.4 milliGRAM(s) Oral at bedtime    MEDICATIONS  (PRN):  ondansetron Injectable 4 milliGRAM(s) IV Push every 6 hours PRN Nausea and/or Vomiting      REVIEW OF SYSTEMS:    Non communicative  can not be obtained       Vital Signs Last 24 Hrs  T(C): 36.9 (05 Mar 2020 04:20), Max: 37.2 (04 Mar 2020 17:36)  T(F): 98.4 (05 Mar 2020 04:20), Max: 98.9 (04 Mar 2020 17:36)  HR: 78 (05 Mar 2020 06:00) (78 - 110)  BP: 113/75 (05 Mar 2020 06:00) (101/54 - 142/71)  BP(mean): 88 (05 Mar 2020 06:00) (68 - 98)  RR: 15 (05 Mar 2020 06:00) (15 - 28)  SpO2: 97% (05 Mar 2020 06:00) (96% - 100%)    I&O's Summary    04 Mar 2020 07:01  -  05 Mar 2020 07:00  --------------------------------------------------------  IN: 2470 mL / OUT: 4 mL / NET: 2466 mL        PHYSICAL EXAM:    Constitutional: NAD, awake and alert, well-developed  HEENT: PERR, EOMI,  No oral cyananosis.  Neck:  supple,  No JVD  Respiratory: Breath sounds are clear bilaterally, No wheezing, rales or rhonchi  Cardiovascular: S1 and S2, regular rate and rhythm, no Murmurs, gallops or rubs  Gastrointestinal: Bowel Sounds present, soft, nontender.   Extremities: No peripheral edema. No clubbing or cyanosis.  Vascular: 2+ peripheral pulses  Neurological: A/O non communicative  no focal deficits  Musculoskeletal: no calf tenderness.  Skin: No rashes.      LABS: All Labs Reviewed:                        9.6    9.39  )-----------( 171      ( 05 Mar 2020 06:23 )             28.2                         10.3   12.21 )-----------( 193      ( 04 Mar 2020 17:41 )             30.9                         9.6    14.49 )-----------( 193      ( 04 Mar 2020 10:09 )             28.4     05 Mar 2020 06:23    147    |  112    |  17     ----------------------------<  116    3.5     |  27     |  0.76   04 Mar 2020 10:09    140    |  109    |  42     ----------------------------<  123    4.7     |  24     |  0.83   04 Mar 2020 06:38    141    |  111    |  41     ----------------------------<  131    4.3     |  22     |  0.79     Ca    8.8        05 Mar 2020 06:23  Ca    7.9        04 Mar 2020 10:09  Ca    8.2        04 Mar 2020 06:38  Phos  1.7       05 Mar 2020 06:23  Phos  2.8       04 Mar 2020 10:09  Phos  1.4       04 Mar 2020 06:38  Mg     1.7       05 Mar 2020 06:23  Mg     1.8       04 Mar 2020 06:38    TPro  4.9    /  Alb  2.3    /  TBili  0.2    /  DBili  x      /  AST  22     /  ALT  19     /  AlkPhos  62     05 Mar 2020 06:23  TPro  4.9    /  Alb  2.3    /  TBili  0.3    /  DBili  x      /  AST  22     /  ALT  17     /  AlkPhos  66     04 Mar 2020 10:09  TPro  6.9    /  Alb  3.3    /  TBili  0.2    /  DBili  x      /  AST  20     /  ALT  21     /  AlkPhos  94     03 Mar 2020 21:11    PT/INR - ( 03 Mar 2020 21:11 )   PT: 19.1 sec;   INR: 1.69 ratio         PTT - ( 03 Mar 2020 21:11 )  PTT:41.3 sec  CARDIAC MARKERS ( 04 Mar 2020 10:09 )  .572 ng/mL / x     / x     / x     / x      CARDIAC MARKERS ( 04 Mar 2020 06:38 )  .722 ng/mL / x     / x     / x     / x      CARDIAC MARKERS ( 03 Mar 2020 21:11 )  .346 ng/mL / x     / x     / x     / x          Blood Culture:   03-03 @ 21:11  Pro Bnp [id number]        RADIOLOGY/EKG:  sinus tachycardiac  poor baseline     < from: US Transthoracic Echocardiogram w/Doppler Complete (03.04.20 @ 08:47) >    SUMMARY:  1. left ventricular hypertrophy, hyperdynamic overall left ventricular systolic function, mild diastolic dysfunction  2. mild tricuspid regurgitation with estimated right ventricular systolic pressure 37 mmHg      < end of copied text >

## 2020-03-05 NOTE — PROGRESS NOTE ADULT - PROBLEM SELECTOR PLAN 1
PUD - gastric ulcers and gastropathy and hiatal hernia  PPI  s/p EGD  s/p PRBC  I and O  keep Hgb > 7  caution with IVF - monitor for vol overload  monitor VS and HD  GI follow up  cont SPCU monitoring for now  dvt p - seq teds  xarelto on hold

## 2020-03-05 NOTE — PROGRESS NOTE ADULT - ASSESSMENT
63 y/o M w/ omh of MR, autism non-verbal, bipolar, schizophrenia, gerd, htn, hld, bph, L. femoral/popliteal DVT (on xarelto), presented to [facility] on 3/3/2020 by group home staff for sob. Pt was found to be tacycardic to 130s, Wbc of 20, hemoglobin of 12.7. Pt noted to have coffee ground hematemesis and gisel color BM with blood clots. Pt underwent a CTA w/ no acute finding and admitted to the MICU for further tx/evaulation.    -Neuro: Mental status stable protecting his airway, monitor closely, MR/autism/bipolar/schizophrenia cont risperdal and luvox  -Cardiac: HDS stable at this time w/ no acute issues, maintain MAP >65  -Resp: No acute issues, maintain o2 >92%  -GI: Presumed UGIB cont protonix gtt, NPO for now, monitor h/h and plts closely, seen by GI  agreed on 1U of PRBC and possible EGD today  -Renal: Renal function stable, HypoK tx and improved, Hypophos ordered 15mmol of kphos, otherwise stable lytes, cont to monitor renal function and lytes closely  -ID: Doubt any acute infectious process at this time, hold off on IVAB f/u on blood cx  -Heme: hemoglobin and plts dropping, hold off AC and antiplatelet agents at this time, pt actively still passing maroon color stool will order 1U of PRBC   -Endo: no active isues, monitor FS q6h given NPO status  -Dispo: Pt HDS at this time but at risk for HD instability GOC d/w brother via phone and consents obtained, case d/w pulmonary, cards, GI and hospitilize, called blood bank and placed 2U of PRBC and 1U of plts on standby
64M with profound MR/autism with aggression and agitated behavior, bipolar affective disorder, schizophrenia paranoid type, BPH, GERD, hx of hematuria who presents with  active gi bleeding Now in SPCU for closer monitoring. Patient on PPI drip, s/p endoscopy, found to have a a gastritis +  bleeding esophageal ulcer + large hiatal hernia    Melena / Acute Blood Anemia secondary to Peptic Ulcer Disease + Hemorrhaging Esophageal Ulcer  - GI /Critical Care Consulted  -Hb drop from 12 to 9  - s/p 1 unit of PRBC on 3/5  - s/p  IVF hydration now completed  - EGD Positive for gastritis + esophogeal ulcer + large hiatal hernia  -Alll anticoagulants such as NSAIDs, ASA, xarelto  - c/w PPI infusion ( until 3/6)  - advance diet    Type II NSTEMI  - cardiology consulted.. cleared for EGD will need outpatient   - lopressor added  -  patient will likely need outpatient ischemic evaluation    Bipolar/Schizophrenia  - cont fluvoxamine     BPH  - cont tamsulosin    GERD  PPI drip until 3/6    HTN  - hold any antihypertensives while actively bleeding     Absolutley no Anticoagulation for SEVEN Days
64M with profound MR/autism with aggression and agitated behavior, bipolar affective disorder, schizophrenia paranoid type, BPH, GERD, hx of hematuria who presents with GI bleeding.   Was found to have a gastric ulcer on endoscopy last year when he was admitted for a GI bleed.      GI bleeding  - admitted to SPCU, likely UGIB  - serial CBC - will likely need transfusion today. Plan for EGD with Dr. Aviles.  - lactate normalized  - GI consult (Dr. Aviles saw patient last year)  - cc consult (Dr. Lopez saw patient last year)  - IVF hydration for now  - hold anticoagulants such as NSAIDs, ASA, xarelto  - PPI infusion  - NPO  - plan for endoscopy today 3/4. no absolute medical contraindication to proposed procedure. appreciate cardio clearance.  - spoke to brother/advocate Alexis to update him and obtain consent for blood products as well as endoscopic procedure    Elevated troponin  - likely 2/2 demand ischemia  - will trend troponin. f/u echo results.  - holding AC's anyhow given active GI bleed  - cardiology consult    Bipolar/Schizophrenia  - cont fluvoxamine     BPH  - cont tamsulosin    GERD  - will be on PPI    HTN  - hold any antihypertensives while actively bleeding   - monitor hemodynamics - currently stable    Preventive measures  IMPROVE VTE Individual Risk Assessment          RISK                                                          Points  [  ] Previous VTE                                                 3  [  ] Thrombophilia                                              2  [  ] Lower limb paralysis                                    2        (unable to hold up >15 seconds)    [  ] Current Cancer                                             2         (within 6 months)  [  ] Immobilization > 24 hrs                              1  [  ] ICU/CCU stay > 24 hours                            1  [X] Age > 60                                                        1    IMPROVE VTE Score 1  -holding ACs 2/2 active bleed. SCDs.
65 y/o M w/ omh of MR, autism non-verbal, bipolar, schizophrenia, gerd, htn, hld, bph, L. femoral/popliteal DVT (on xarelto), presented to [facility] on 3/3/2020 by group home staff for sob. Pt was found to be tacycardic to 130s, Wbc of 20, hemoglobin of 12.7. Pt noted to have coffee ground hematemesis and gisel color BM with blood clots. Pt underwent a CTA w/ no acute finding and admitted to the MICU for further tx/evaulation.    -Neuro: Mental status stable w/ no acute issues monitor closely, MR/autism/bipolar/schizophrenia cont risperdal and luvox  -Cardiac: HDS stable at this time w/ no acute issues, maintain MAP >65  -Resp: No acute issues, maintain o2 >92%  -GI: UGIB likely 2/2 to esophageal ulcer now resolved cont protonix gtt,  diet advanced from NPO--->clears, GI input noted  -Renal: Renal function stable, lytes reviewed mag/phos repleted cont to monitor renal function and lytes closely, d/c IVF pt tolerating po  -ID: Doubt any acute infectious process at this time, hold off on IVAB f/u on blood cx  -Heme: h/h stable, hold off AC and antiplatelet agents at this time, repeat CBC at 6pm, dvt ppx w/ SCD  -Endo: no active isuess  -Dispo: Pt HDS at this time remains stable, no evidence of active bleeding per GI during EGD, case d/w pulmonary, cards, GI and hospitalize if h/h remains stable till tomorrow AM pt can be downgraded to floor
64M with profound MR/autism with aggression and agitated behavior, bipolar affective disorder, schizophrenia paranoid type, hx of DVT on Xarelto currently, BPH, GERD, hx of hematuria presents to ED w/ SOB and adm w/ GIB. s/p EGD showing gastric ulcers, esophageal ulcer, and large hiatal hernia.     No critical care issues   -VSS  -cont protonix drip   -clear diet as tolerated   -monitor Hgb         (Reviewing data, imaging, discussing with multidisciplinary team, non inclusive of procedures, discussing goals of care with patient/family)

## 2020-03-05 NOTE — PROGRESS NOTE ADULT - SUBJECTIVE AND OBJECTIVE BOX
Patient seen and examined at bedside. aid at bedside. patient comfortable. on PPI drip  Seen by GI and Cardiology  Was having active melena and clots yesterday  given prbc 3/4  s/p EGD  3/4 ,,. showing gastritis, esophageal ulcer, and large hiatal hernia.   this am:: spoke to aid, no further bleeding noted.  Hb drop to 9.7  IV fluids dced  diet advanced      Review of Systems:  unable to obtain, patient nonverbal    Objective:  Vitals  T(C): 36.4 (03-05-20 @ 08:15), Max: 37.2 (03-04-20 @ 17:36)  HR: 97 (03-05-20 @ 10:00) (78 - 105)  BP: 116/70 (03-05-20 @ 10:00) (101/54 - 142/71)  RR: 22 (03-05-20 @ 10:00) (15 - 28)  SpO2: 95% (03-05-20 @ 10:00) (95% - 100%)    Physical Exam:  General: comfortable, no acute distress, well nourished  HEENT: Atraumatic, no LAD, trachea midline, PERRLA  Cardiovascular: normal s1s2, no murmurs, gallops or fricition rubs  Pulmonary: clear to ausculation Bilaterally, no wheezing , rhonchi  Gastrointestinal: soft non tender non distended, no masses felt, no organomegally  Muscloskeletal: no lower extremity edema, intact bilateral lower extremity pulses  Neurological: CN II-12 intact. No focal weakness  Psychiatrical: normal mood, cooperative  SKIN: no rash, lesions or ulcers    ~  Labs:                          9.6    9.39  )-----------( 171      ( 05 Mar 2020 06:23 )             28.2     03-05    147<H>  |  112<H>  |  17  ----------------------------<  116<H>  3.5   |  27  |  0.76    Ca    8.8      05 Mar 2020 06:23  Phos  1.7     03-05  Mg     1.7     03-05    TPro  4.9<L>  /  Alb  2.3<L>  /  TBili  0.2  /  DBili  x   /  AST  22  /  ALT  19  /  AlkPhos  62  03-05    LIVER FUNCTIONS - ( 05 Mar 2020 06:23 )  Alb: 2.3 g/dL / Pro: 4.9 g/dL / ALK PHOS: 62 U/L / ALT: 19 U/L DA / AST: 22 U/L / GGT: x           PT/INR - ( 03 Mar 2020 21:11 )   PT: 19.1 sec;   INR: 1.69 ratio         PTT - ( 03 Mar 2020 21:11 )  PTT:41.3 sec      Active Medications  MEDICATIONS  (STANDING):  atorvastatin 10 milliGRAM(s) Oral at bedtime  chlorhexidine 2% Cloths 1 Application(s) Topical <User Schedule>  fluticasone propionate 50 MICROgram(s)/spray Nasal Spray 1 Spray(s) Both Nostrils two times a day  fluvoxaMINE 50 milliGRAM(s) Oral two times a day  metoprolol succinate ER 12.5 milliGRAM(s) Oral daily  pantoprazole Infusion 8 mG/Hr (10 mL/Hr) IV Continuous <Continuous>  risperiDONE   Tablet 2 milliGRAM(s) Oral two times a day  sucralfate suspension 1 Gram(s) Oral every 6 hours  tamsulosin 0.4 milliGRAM(s) Oral at bedtime    MEDICATIONS  (PRN):  ondansetron Injectable 4 milliGRAM(s) IV Push every 6 hours PRN Nausea and/or Vomiting

## 2020-03-05 NOTE — PROGRESS NOTE ADULT - SUBJECTIVE AND OBJECTIVE BOX
INTERVAL HPI/OVERNIGHT EVENTS:  sp egd with multiple ulcers    MEDICATIONS  (STANDING):  atorvastatin 10 milliGRAM(s) Oral at bedtime  chlorhexidine 2% Cloths 1 Application(s) Topical <User Schedule>  fluticasone propionate 50 MICROgram(s)/spray Nasal Spray 1 Spray(s) Both Nostrils two times a day  fluvoxaMINE 50 milliGRAM(s) Oral two times a day  metoprolol succinate ER 12.5 milliGRAM(s) Oral daily  pantoprazole Infusion 8 mG/Hr (10 mL/Hr) IV Continuous <Continuous>  risperiDONE   Tablet 2 milliGRAM(s) Oral two times a day  sucralfate suspension 1 Gram(s) Oral every 6 hours  tamsulosin 0.4 milliGRAM(s) Oral at bedtime    MEDICATIONS  (PRN):  ondansetron Injectable 4 milliGRAM(s) IV Push every 6 hours PRN Nausea and/or Vomiting      Allergies    No Known Allergies    Intolerances        Review of Systems:    General:  No wt loss, fevers, chills, night sweats,fatigue,   Eyes:  Good vision, no reported pain  ENT:  No sore throat, pain, runny nose, dysphagia  CV:  No pain, palpitatioins, hypo/hypertension  Resp:  No dyspnea, cough, tachypnea, wheezing  GI:  No pain, No nausea, No vomiting, No diarrhea, No constipatiion, No weight loss, No fever, No pruritis, No rectal bleeding, No tarry stools, No dysphagia,  :  No pain, bleeding, incontinence, nocturia  Muscle:  No pain, weakness  Neuro:  No weakness, tingling, memory problems  Psych:  No fatigue, insomnia, mood problems, depression  Endocrine:  No polyuria, polydypsia, cold/heat intolerance  Heme:  No petechiae, ecchymosis, easy bruisability  Skin:  No rash, tattoos, scars, edema      Vital Signs Last 24 Hrs  T(C): 36.9 (05 Mar 2020 04:20), Max: 37.2 (04 Mar 2020 17:36)  T(F): 98.4 (05 Mar 2020 04:20), Max: 98.9 (04 Mar 2020 17:36)  HR: 97 (05 Mar 2020 10:00) (78 - 105)  BP: 116/70 (05 Mar 2020 10:00) (101/54 - 142/71)  BP(mean): 83 (05 Mar 2020 10:00) (68 - 93)  RR: 22 (05 Mar 2020 10:00) (15 - 28)  SpO2: 95% (05 Mar 2020 10:00) (95% - 100%)    PHYSICAL EXAM:    Constitutional: NAD, well-developed  HEENT: EOMI, throat clear  Neck: No LAD, supple  Respiratory: CTA and P  Cardiovascular: S1 and S2, RRR, no M  Gastrointestinal: BS+, soft, NT/ND, neg HSM,  Extremities: No peripheral edema, neg clubing, cyanosis  Vascular: 2+ peripheral pulses  Neurological: A/O x 3, no focal deficits  Psychiatric: Normal mood, normal affect  Skin: No rashes      LABS:                        9.6    9.39  )-----------( 171      ( 05 Mar 2020 06:23 )             28.2     03-05    147<H>  |  112<H>  |  17  ----------------------------<  116<H>  3.5   |  27  |  0.76    Ca    8.8      05 Mar 2020 06:23  Phos  1.7     03-05  Mg     1.7     03-05    TPro  4.9<L>  /  Alb  2.3<L>  /  TBili  0.2  /  DBili  x   /  AST  22  /  ALT  19  /  AlkPhos  62  03-05    PT/INR - ( 03 Mar 2020 21:11 )   PT: 19.1 sec;   INR: 1.69 ratio         PTT - ( 03 Mar 2020 21:11 )  PTT:41.3 sec  Urinalysis Basic - ( 04 Mar 2020 00:52 )    Color: Yellow / Appearance: Clear / S.015 / pH: x  Gluc: x / Ketone: Small  / Bili: Negative / Urobili: Negative mg/dL   Blood: x / Protein: 30 mg/dL / Nitrite: Negative   Leuk Esterase: Negative / RBC: 0-2 /HPF / WBC 0-2   Sq Epi: x / Non Sq Epi: Occasional / Bacteria: Occasional        RADIOLOGY & ADDITIONAL TESTS:

## 2020-03-06 ENCOUNTER — TRANSCRIPTION ENCOUNTER (OUTPATIENT)
Age: 65
End: 2020-03-06

## 2020-03-06 VITALS
TEMPERATURE: 98 F | HEART RATE: 74 BPM | RESPIRATION RATE: 16 BRPM | DIASTOLIC BLOOD PRESSURE: 74 MMHG | OXYGEN SATURATION: 95 % | SYSTOLIC BLOOD PRESSURE: 115 MMHG

## 2020-03-06 LAB
ALBUMIN SERPL ELPH-MCNC: 2.4 G/DL — LOW (ref 3.3–5)
ALP SERPL-CCNC: 67 U/L — SIGNIFICANT CHANGE UP (ref 30–120)
ALT FLD-CCNC: 32 U/L DA — SIGNIFICANT CHANGE UP (ref 10–60)
ANION GAP SERPL CALC-SCNC: 1 MMOL/L — LOW (ref 5–17)
AST SERPL-CCNC: 28 U/L — SIGNIFICANT CHANGE UP (ref 10–40)
BILIRUB SERPL-MCNC: 0.2 MG/DL — SIGNIFICANT CHANGE UP (ref 0.2–1.2)
BUN SERPL-MCNC: 12 MG/DL — SIGNIFICANT CHANGE UP (ref 7–23)
CALCIUM SERPL-MCNC: 8.8 MG/DL — SIGNIFICANT CHANGE UP (ref 8.4–10.5)
CHLORIDE SERPL-SCNC: 110 MMOL/L — HIGH (ref 96–108)
CO2 SERPL-SCNC: 31 MMOL/L — SIGNIFICANT CHANGE UP (ref 22–31)
CREAT SERPL-MCNC: 0.74 MG/DL — SIGNIFICANT CHANGE UP (ref 0.5–1.3)
GLUCOSE SERPL-MCNC: 119 MG/DL — HIGH (ref 70–99)
HCT VFR BLD CALC: 27.8 % — LOW (ref 39–50)
HGB BLD-MCNC: 9.3 G/DL — LOW (ref 13–17)
MAGNESIUM SERPL-MCNC: 2.2 MG/DL — SIGNIFICANT CHANGE UP (ref 1.6–2.6)
MCHC RBC-ENTMCNC: 29.8 PG — SIGNIFICANT CHANGE UP (ref 27–34)
MCHC RBC-ENTMCNC: 33.5 GM/DL — SIGNIFICANT CHANGE UP (ref 32–36)
MCV RBC AUTO: 89.1 FL — SIGNIFICANT CHANGE UP (ref 80–100)
NRBC # BLD: 0 /100 WBCS — SIGNIFICANT CHANGE UP (ref 0–0)
PHOSPHATE SERPL-MCNC: 2.7 MG/DL — SIGNIFICANT CHANGE UP (ref 2.5–4.5)
PLATELET # BLD AUTO: 178 K/UL — SIGNIFICANT CHANGE UP (ref 150–400)
POTASSIUM SERPL-MCNC: 3.7 MMOL/L — SIGNIFICANT CHANGE UP (ref 3.5–5.3)
POTASSIUM SERPL-SCNC: 3.7 MMOL/L — SIGNIFICANT CHANGE UP (ref 3.5–5.3)
PROT SERPL-MCNC: 5.2 G/DL — LOW (ref 6–8.3)
RBC # BLD: 3.12 M/UL — LOW (ref 4.2–5.8)
RBC # FLD: 13.1 % — SIGNIFICANT CHANGE UP (ref 10.3–14.5)
SODIUM SERPL-SCNC: 142 MMOL/L — SIGNIFICANT CHANGE UP (ref 135–145)
WBC # BLD: 7.72 K/UL — SIGNIFICANT CHANGE UP (ref 3.8–10.5)
WBC # FLD AUTO: 7.72 K/UL — SIGNIFICANT CHANGE UP (ref 3.8–10.5)

## 2020-03-06 PROCEDURE — 99239 HOSP IP/OBS DSCHRG MGMT >30: CPT

## 2020-03-06 PROCEDURE — 74177 CT ABD & PELVIS W/CONTRAST: CPT

## 2020-03-06 PROCEDURE — 82962 GLUCOSE BLOOD TEST: CPT

## 2020-03-06 PROCEDURE — 83880 ASSAY OF NATRIURETIC PEPTIDE: CPT

## 2020-03-06 PROCEDURE — 99285 EMERGENCY DEPT VISIT HI MDM: CPT | Mod: 25

## 2020-03-06 PROCEDURE — 88312 SPECIAL STAINS GROUP 1: CPT

## 2020-03-06 PROCEDURE — P9016: CPT

## 2020-03-06 PROCEDURE — 84484 ASSAY OF TROPONIN QUANT: CPT

## 2020-03-06 PROCEDURE — 85027 COMPLETE CBC AUTOMATED: CPT

## 2020-03-06 PROCEDURE — 82272 OCCULT BLD FECES 1-3 TESTS: CPT

## 2020-03-06 PROCEDURE — 86850 RBC ANTIBODY SCREEN: CPT

## 2020-03-06 PROCEDURE — 88305 TISSUE EXAM BY PATHOLOGIST: CPT

## 2020-03-06 PROCEDURE — 87040 BLOOD CULTURE FOR BACTERIA: CPT

## 2020-03-06 PROCEDURE — 83605 ASSAY OF LACTIC ACID: CPT

## 2020-03-06 PROCEDURE — 86923 COMPATIBILITY TEST ELECTRIC: CPT

## 2020-03-06 PROCEDURE — 93306 TTE W/DOPPLER COMPLETE: CPT

## 2020-03-06 PROCEDURE — 36430 TRANSFUSION BLD/BLD COMPNT: CPT

## 2020-03-06 PROCEDURE — 96361 HYDRATE IV INFUSION ADD-ON: CPT

## 2020-03-06 PROCEDURE — 85730 THROMBOPLASTIN TIME PARTIAL: CPT

## 2020-03-06 PROCEDURE — 71045 X-RAY EXAM CHEST 1 VIEW: CPT

## 2020-03-06 PROCEDURE — 80048 BASIC METABOLIC PNL TOTAL CA: CPT

## 2020-03-06 PROCEDURE — 81001 URINALYSIS AUTO W/SCOPE: CPT

## 2020-03-06 PROCEDURE — 84100 ASSAY OF PHOSPHORUS: CPT

## 2020-03-06 PROCEDURE — 96374 THER/PROPH/DIAG INJ IV PUSH: CPT

## 2020-03-06 PROCEDURE — 80053 COMPREHEN METABOLIC PANEL: CPT

## 2020-03-06 PROCEDURE — 86901 BLOOD TYPING SEROLOGIC RH(D): CPT

## 2020-03-06 PROCEDURE — 93005 ELECTROCARDIOGRAM TRACING: CPT

## 2020-03-06 PROCEDURE — 85610 PROTHROMBIN TIME: CPT

## 2020-03-06 PROCEDURE — 36415 COLL VENOUS BLD VENIPUNCTURE: CPT

## 2020-03-06 PROCEDURE — 94640 AIRWAY INHALATION TREATMENT: CPT

## 2020-03-06 PROCEDURE — 86900 BLOOD TYPING SEROLOGIC ABO: CPT

## 2020-03-06 PROCEDURE — 99233 SBSQ HOSP IP/OBS HIGH 50: CPT

## 2020-03-06 PROCEDURE — 83735 ASSAY OF MAGNESIUM: CPT

## 2020-03-06 RX ORDER — PANTOPRAZOLE SODIUM 20 MG/1
40 TABLET, DELAYED RELEASE ORAL
Refills: 0 | Status: DISCONTINUED | OUTPATIENT
Start: 2020-03-06 | End: 2020-03-06

## 2020-03-06 RX ORDER — SUCRALFATE 1 G
10 TABLET ORAL
Qty: 1200 | Refills: 0
Start: 2020-03-06 | End: 2020-04-04

## 2020-03-06 RX ORDER — PANTOPRAZOLE SODIUM 20 MG/1
1 TABLET, DELAYED RELEASE ORAL
Qty: 60 | Refills: 0
Start: 2020-03-06 | End: 2020-04-04

## 2020-03-06 RX ORDER — RIVAROXABAN 15 MG-20MG
1 KIT ORAL
Qty: 0 | Refills: 0 | DISCHARGE

## 2020-03-06 RX ORDER — SUCRALFATE 1 G
1 TABLET ORAL
Qty: 120 | Refills: 0
Start: 2020-03-06 | End: 2020-04-04

## 2020-03-06 RX ORDER — PANTOPRAZOLE SODIUM 20 MG/1
1 TABLET, DELAYED RELEASE ORAL
Qty: 0 | Refills: 0 | DISCHARGE

## 2020-03-06 RX ADMIN — Medication 12.5 MILLIGRAM(S): at 05:59

## 2020-03-06 RX ADMIN — Medication 1 GRAM(S): at 12:43

## 2020-03-06 RX ADMIN — Medication 1 GRAM(S): at 06:00

## 2020-03-06 RX ADMIN — FLUVOXAMINE MALEATE 50 MILLIGRAM(S): 25 TABLET ORAL at 05:59

## 2020-03-06 RX ADMIN — PANTOPRAZOLE SODIUM 10 MG/HR: 20 TABLET, DELAYED RELEASE ORAL at 05:12

## 2020-03-06 RX ADMIN — RISPERIDONE 2 MILLIGRAM(S): 4 TABLET ORAL at 05:59

## 2020-03-06 RX ADMIN — CHLORHEXIDINE GLUCONATE 1 APPLICATION(S): 213 SOLUTION TOPICAL at 05:59

## 2020-03-06 RX ADMIN — Medication 1 SPRAY(S): at 05:59

## 2020-03-06 NOTE — DISCHARGE NOTE PROVIDER - HOSPITAL COURSE
FROM ADMISSION H+P:     HPI:    64M with profound MR/autism with aggression and agitated behavior, bipolar affective disorder, schizophrenia paranoid type, hx of DVT on Xarelto currently, BPH, GERD, hx of hematuria who presents with GI bleeding.  Patient is unable to provide any history given his profound MR and therefore collateral information obtain from notes and charts.  Per notes, patient was complaining of abdominal pain was starting to look pale at the facility.  Staff unsure of any dark stools or hematemesis.  No known nausea/vomiting, fevers/chills.  Patient is on xarelto and iron.  In the ED, patient was noted to be tachycardic to 142 but had a stable BP at 126/74.  Initial hgb was 12.7.  During the ED course, patient then started to have active melena and hematemesis.  CTA was done that showed few mildly dilated loops of bowel.  A STAT hgb was done and was 11.6.  Patient's vitals remained stable with a HR in the 110s.  Started on IV PPI.  Labs also showed a lactate 3.6 with an elevated troponin 0.346.  Patient is being admitted to SPCU for further management of GI bleed. (04 Mar 2020 00:17)            ---    HOSPITAL COURSE: Patient was admitted for UGIB with melena and clots. Noted to have elevated troponin - likely demand ischemia from GI bleed - no evidence of acute myocardial infarction. To have outpatient ischemic evaluation with Dr. Palla if tolerated. Patient was started on low dose beta blocker, discontinued by cardiology as pt with 3.15 sec pause on telemetry and no large troponin leak suggestive of MI. Transfused 1U PRBCs in this hospitalization for acute blood loss anemia from UGIB. Had endoscopy with Dr. Castillo - showed multiple ulcers - gastritis, esophageal ulcer, and large hiatal hernia. Treated with protonix drip and sucralfate. AC held. Needs to be held for another 7 days after discharge. GI cleared patient for discharge. Stable for discharge with close outpatient follow up.            T(C): 36.8 (06 Mar 2020 07:39), Max: 37.1 (05 Mar 2020 18:34)    T(F): 98.2 (06 Mar 2020 07:39), Max: 98.8 (05 Mar 2020 18:34)    HR: 74 (06 Mar 2020 07:39) (71 - 89)    BP: 115/74 (06 Mar 2020 07:39) (107/69 - 129/79)    BP(mean): 73 (05 Mar 2020 16:00) (73 - 82)    RR: 16 (06 Mar 2020 07:39) (16 - 22)    SpO2: 95% (06 Mar 2020 07:39) (93% - 100%)        GENERAL: NAD, well-groomed, well-developed    HEAD:  Atraumatic, Normocephalic    EYES: EOMI, PERRLA, conjunctiva and sclera clear    ENMT: No tonsillar erythema, exudates, or enlargement; Moist mucous membranes, Good dentition, No lesions    NECK: Supple, No JVD, Normal thyroid    NERVOUS SYSTEM:  Awake and alert, moves all extremities    CHEST/LUNG: Clear to auscultation bilaterally    HEART: Regular rate and rhythm; No murmurs, rubs, or gallops    ABDOMEN: Soft, Nontender, Nondistended; Bowel sounds present    EXTREMITIES:  2+ Peripheral Pulses, No clubbing, cyanosis, or edema    LYMPH: No lymphadenopathy noted    SKIN: warm, well perfused        ---    CONSULTANTS:     Cardio Palla [facility]    ---    TIME SPENT:    The total amount of time spent reviewing the hospital notes, laboratory values, imaging findings, assessing/counseling the patient, discussing with consultant physicians, social work, nursing staff was 36 minutes        ---    Primary care provider was made aware of plan for discharge:      [  ] NO     [ x] YES FROM ADMISSION H+P:     HPI:    64M with profound MR/autism with aggression and agitated behavior, bipolar affective disorder, schizophrenia paranoid type, hx of DVT on Xarelto currently, BPH, GERD, hx of hematuria who presents with GI bleeding.  Patient is unable to provide any history given his profound MR and therefore collateral information obtain from notes and charts.  Per notes, patient was complaining of abdominal pain was starting to look pale at the facility.  Staff unsure of any dark stools or hematemesis.  No known nausea/vomiting, fevers/chills.  Patient is on xarelto and iron.  In the ED, patient was noted to be tachycardic to 142 but had a stable BP at 126/74.  Initial hgb was 12.7.  During the ED course, patient then started to have active melena and hematemesis.  CTA was done that showed few mildly dilated loops of bowel.  A STAT hgb was done and was 11.6.  Patient's vitals remained stable with a HR in the 110s.  Started on IV PPI.  Labs also showed a lactate 3.6 with an elevated troponin 0.346.  Patient is being admitted to SPCU for further management of GI bleed. (04 Mar 2020 00:17)            ---    HOSPITAL COURSE: Patient was admitted for UGIB with melena and clots. Noted to have elevated troponin - likely demand ischemia from GI bleed - no evidence of acute myocardial infarction. To have outpatient ischemic evaluation with Dr. Palla if tolerated. Patient was started on low dose beta blocker, discontinued by cardiology as pt with 3.15 sec pause on telemetry and no large troponin leak suggestive of MI. Transfused 1U PRBCs in this hospitalization for acute blood loss anemia from UGIB. Had endoscopy with Dr. Castillo - showed multiple ulcers - gastritis, esophageal ulcer, and large hiatal hernia. Treated with protonix drip and sucralfate. AC held. Needs to be held for another 7 days after discharge. GI cleared patient for discharge. Stable for discharge with close outpatient follow up.            T(C): 36.8 (06 Mar 2020 07:39), Max: 37.1 (05 Mar 2020 18:34)    T(F): 98.2 (06 Mar 2020 07:39), Max: 98.8 (05 Mar 2020 18:34)    HR: 74 (06 Mar 2020 07:39) (71 - 89)    BP: 115/74 (06 Mar 2020 07:39) (107/69 - 129/79)    BP(mean): 73 (05 Mar 2020 16:00) (73 - 82)    RR: 16 (06 Mar 2020 07:39) (16 - 22)    SpO2: 95% (06 Mar 2020 07:39) (93% - 100%)        GENERAL: NAD, well-groomed, well-developed    HEAD:  Atraumatic, Normocephalic    EYES: EOMI, PERRLA, conjunctiva and sclera clear    ENMT: No tonsillar erythema, exudates, or enlargement; Moist mucous membranes, Good dentition, No lesions    NECK: Supple, No JVD, Normal thyroid    NERVOUS SYSTEM:  Awake and alert, moves all extremities    CHEST/LUNG: Clear to auscultation bilaterally    HEART: Regular rate and rhythm; No murmurs, rubs, or gallops    ABDOMEN: Soft, Nontender, Nondistended; Bowel sounds present    EXTREMITIES:  2+ Peripheral Pulses, No clubbing, cyanosis, or edema    LYMPH: No lymphadenopathy noted    SKIN: warm, well perfused        ---    CONSULTANTS:     Cardio Palla [facility]    ---    TIME SPENT:    The total amount of time spent reviewing the hospital notes, laboratory values, imaging findings, assessing/counseling the patient, discussing with consultant physicians, social work, nursing staff was 36 minutes        ---    Primary care provider was made aware of plan for discharge:      [  ] NO     [ x] YES     called PMD Dr. Pina    cardiology aware

## 2020-03-06 NOTE — DISCHARGE NOTE PROVIDER - NSDCCPCAREPLAN_GEN_ALL_CORE_FT
PRINCIPAL DISCHARGE DIAGNOSIS  Diagnosis: Peptic ulcer disease with hemorrhage  Assessment and Plan of Treatment: EGD with Dr. Castillo showed muliple ulcers - gastritis, esophageal ulcer, and large hiatal hernia. transfused 1U PRBC in hospitalization. Continue protonix twice a day and carafate as prescribed. HOLD XARELTO FOR ANOTHER 7 DAYS. follow up with PMD or cardio Dr. Palla (for ischemic evaluation) within 1 week prior to resuming blood thinner. Follow up with your GI specialist. Avoid aspirin/NSAIDs (such as advil, motrin, ibuprofen, etc)      SECONDARY DISCHARGE DIAGNOSES  Diagnosis: Elevated troponin  Assessment and Plan of Treatment: likely demand ischemia in setting of GI bleed - no evidence of acute myocardial infarcation. continue statin. beta blocker was initiated by cardiology and subsequently stopped due to asymptomatic pause on telemetry with no evidence of acute heart attack. Outpatient ischemic evaluation suggested with Dr. Palla if patient can tolerate.    Diagnosis: History of DVT (deep vein thrombosis)  Assessment and Plan of Treatment: Will need to HOLD home xarelto 10mg daily for another 7 days until 3/14 per GI recommendations. Follow up with PMD prior to resuming blood thinner.    Diagnosis: Mood disorder  Assessment and Plan of Treatment: Bipolar affective/Schizophrenia paranoid type - continue home meds    Diagnosis: Hyperlipidemia  Assessment and Plan of Treatment: continue statin PRINCIPAL DISCHARGE DIAGNOSIS  Diagnosis: Peptic ulcer disease with hemorrhage  Assessment and Plan of Treatment: EGD with Dr. Castillo showed muliple ulcers - gastritis, esophageal ulcer, and large hiatal hernia. transfused 1U PRBC in hospitalization. Continue protonix twice a day and carafate as prescribed. HOLD XARELTO FOR ANOTHER 7 DAYS. follow up with PMD or cardio Dr. Palla (for ischemic evaluation) within 1 week prior to resuming blood thinner. Follow up with your GI specialist. Avoid aspirin/NSAIDs (such as advil, motrin, ibuprofen, etc).      SECONDARY DISCHARGE DIAGNOSES  Diagnosis: Anemia  Assessment and Plan of Treatment: acute on chronic blood loss anemia. can resume iron supplementations. H/H on discharge 9.3/27.8. Hemodynamically stable. Continue protonix and carafate. Follow up with PMD.    Diagnosis: Elevated troponin  Assessment and Plan of Treatment: likely demand ischemia in setting of GI bleed - no evidence of acute myocardial infarcation. continue statin. beta blocker was initiated by cardiology and subsequently stopped due to asymptomatic pause on telemetry with no evidence of acute heart attack. Outpatient ischemic evaluation suggested with Dr. Palla if patient can tolerate.    Diagnosis: History of DVT (deep vein thrombosis)  Assessment and Plan of Treatment: Will need to HOLD home xarelto 10mg daily for another 7 days until 3/14 per GI recommendations. Follow up with PMD prior to resuming blood thinner.    Diagnosis: Mood disorder  Assessment and Plan of Treatment: Bipolar affective/Schizophrenia paranoid type - continue home meds    Diagnosis: Hyperlipidemia  Assessment and Plan of Treatment: continue statin

## 2020-03-06 NOTE — PROGRESS NOTE ADULT - SUBJECTIVE AND OBJECTIVE BOX
64M with profound MR/autism with aggression and agitated behavior, bipolar affective disorder, schizophrenia paranoid type, hx of DVT on Xarelto currently, BPH, GERD, hx of hematuria who presents with GI bleeding.  Patient is unable to provide any history given his profound MR and therefore collateral information obtain from notes and charts.  Per notes, patient was complaining of abdominal pain was starting to look pale at the facility.  Staff unsure of any dark stools or hematemesis.  No known nausea/vomiting, fevers/chills.  Patient is on xarelto and iron.  In the ED, patient was noted to be tachycardic to 142 but had a stable BP at 126/74.  Initial hgb was 12.7.  During the ED course, patient then started to have active melena and hematemesis.  CTA was done that showed few mildly dilated loops of bowel.  A STAT hgb was done and was 11.6.  Patient's vitals remained stable with a HR in the 110s.  Started on IV PPI.  Labs also showed a lactate 3.6 with an elevated troponin 0.346.  Patient is being admitted to [facility] for further management of GI bleed. (04 Mar 2020 00:17)    3/5/20 Patient had  mild elevated troponin , had hyperdynamic left ventricular systolic function ,  patient tolerated endoscopy well  , patient vitals stable comfortable , non communicative   3/6/20 remains noncomunicative cannot obtain history.  tele w/ 3.1 sec pause 8am pt awake w/o apparrent symptoms.      MEDICATIONS:    MEDICATIONS  (STANDING):  atorvastatin 10 milliGRAM(s) Oral at bedtime  chlorhexidine 2% Cloths 1 Application(s) Topical <User Schedule>  fluticasone propionate 50 MICROgram(s)/spray Nasal Spray 1 Spray(s) Both Nostrils two times a day  fluvoxaMINE 50 milliGRAM(s) Oral two times a day  metoprolol succinate ER 12.5 milliGRAM(s) Oral daily  pantoprazole    Tablet 40 milliGRAM(s) Oral two times a day  risperiDONE   Tablet 2 milliGRAM(s) Oral two times a day  sucralfate suspension 1 Gram(s) Oral every 6 hours  tamsulosin 0.4 milliGRAM(s) Oral at bedtime    MEDICATIONS  (PRN):  ondansetron Injectable 4 milliGRAM(s) IV Push every 6 hours PRN Nausea and/or Vomiting      Vital Signs Last 24 Hrs  T(C): 36.8 (06 Mar 2020 07:39), Max: 37.1 (05 Mar 2020 18:34)  T(F): 98.2 (06 Mar 2020 07:39), Max: 98.8 (05 Mar 2020 18:34)  HR: 74 (06 Mar 2020 07:39) (71 - 89)  BP: 115/74 (06 Mar 2020 07:39) (107/69 - 129/79)  BP(mean): 73 (05 Mar 2020 16:00) (73 - 82)  RR: 16 (06 Mar 2020 07:39) (16 - 22)  SpO2: 95% (06 Mar 2020 07:39) (93% - 100%)Daily     Daily Weight in k.9 (06 Mar 2020 06:00)I&O's Summary    05 Mar 2020 07:01  -  06 Mar 2020 07:00  --------------------------------------------------------  IN: 1676.5 mL / OUT: 0 mL / NET: 1676.5 mL         EXAM:    Constitutional: NAD, awake and alert,   HEENT: PERR, EOMI,  Neck:  supple,  No JVD  Respiratory: Breath sounds are clear bilaterally, No wheezing, rales or rhonchi  Cardiovascular: S1 and S2, regular rate and rhythm, no Murmurs, gallops or rubs  Gastrointestinal: Bowel Sounds present, soft, nontender.   Extremities: No peripheral edema. No clubbing or cyanosis.  Vascular: 2+ peripheral pulses  Neurological: A/O non communicative  no focal deficits        LABS: All Labs Reviewed:                        9.3    7.72  )-----------( 178      ( 06 Mar 2020 07:04 )             27.8                         9.4    7.33  )-----------( 192      ( 05 Mar 2020 19:04 )             28.3                         9.6    9.39  )-----------( 171      ( 05 Mar 2020 06:23 )             28.2     06 Mar 2020 07:04    142    |  110    |  12     ----------------------------<  119    3.7     |  31     |  0.74   05 Mar 2020 06:23    147    |  112    |  17     ----------------------------<  116    3.5     |  27     |  0.76   04 Mar 2020 10:09    140    |  109    |  42     ----------------------------<  123    4.7     |  24     |  0.83     Ca    8.8        06 Mar 2020 07:04  Ca    8.8        05 Mar 2020 06:23  Ca    7.9        04 Mar 2020 10:09  Phos  2.7       06 Mar 2020 07:04  Phos  1.7       05 Mar 2020 06:23  Phos  2.8       04 Mar 2020 10:09  Mg     2.2       06 Mar 2020 07:04  Mg     1.7       05 Mar 2020 06:23  Mg     1.8       04 Mar 2020 06:38    TPro  5.2    /  Alb  2.4    /  TBili  0.2    /  DBili  x      /  AST  28     /  ALT  32     /  AlkPhos  67     06 Mar 2020 07:04  TPro  4.9    /  Alb  2.3    /  TBili  0.2    /  DBili  x      /  AST  22     /  ALT  19     /  AlkPhos  62     05 Mar 2020 06:23  TPro  4.9    /  Alb  2.3    /  TBili  0.3    /  DBili  x      /  AST  22     /  ALT  17     /  AlkPhos  66     04 Mar 2020 10:09    03-05 @ 08:16  Pro Bnp 240  03-03 @ 21:11  Pro Bnp 768    RADIOLOGY/EKG:  sinus tachycardiac  poor baseline     < from: US Transthoracic Echocardiogram w/Doppler Complete (20 @ 08:47) >    SUMMARY:  1. left ventricular hypertrophy, hyperdynamic overall left ventricular systolic function, mild diastolic dysfunction  2. mild tricuspid regurgitation with estimated right ventricular systolic pressure 37 mmHg      < end of copied text >

## 2020-03-06 NOTE — PROGRESS NOTE ADULT - SUBJECTIVE AND OBJECTIVE BOX
INTERVAL HPI/OVERNIGHT EVENTS:  No new overnight event.  No N/V/D.  Tolerating diet  MEDICATIONS  (STANDING):  atorvastatin 10 milliGRAM(s) Oral at bedtime  chlorhexidine 2% Cloths 1 Application(s) Topical <User Schedule>  fluticasone propionate 50 MICROgram(s)/spray Nasal Spray 1 Spray(s) Both Nostrils two times a day  fluvoxaMINE 50 milliGRAM(s) Oral two times a day  pantoprazole    Tablet 40 milliGRAM(s) Oral two times a day  risperiDONE   Tablet 2 milliGRAM(s) Oral two times a day  sucralfate suspension 1 Gram(s) Oral every 6 hours  tamsulosin 0.4 milliGRAM(s) Oral at bedtime    MEDICATIONS  (PRN):  ondansetron Injectable 4 milliGRAM(s) IV Push every 6 hours PRN Nausea and/or Vomiting      Allergies    No Known Allergies    Intolerances        Review of Systems:    General:  No wt loss, fevers, chills, night sweats,fatigue,   Eyes:  Good vision, no reported pain  ENT:  No sore throat, pain, runny nose, dysphagia  CV:  No pain, palpitatioins, hypo/hypertension  Resp:  No dyspnea, cough, tachypnea, wheezing  GI:  No pain, No nausea, No vomiting, No diarrhea, No constipatiion, No weight loss, No fever, No pruritis, No rectal bleeding, No tarry stools, No dysphagia,  :  No pain, bleeding, incontinence, nocturia  Muscle:  No pain, weakness  Neuro:  No weakness, tingling, memory problems  Psych:  No fatigue, insomnia, mood problems, depression  Endocrine:  No polyuria, polydypsia, cold/heat intolerance  Heme:  No petechiae, ecchymosis, easy bruisability  Skin:  No rash, tattoos, scars, edema      Vital Signs Last 24 Hrs  T(C): 36.8 (06 Mar 2020 07:39), Max: 37.1 (05 Mar 2020 18:34)  T(F): 98.2 (06 Mar 2020 07:39), Max: 98.8 (05 Mar 2020 18:34)  HR: 74 (06 Mar 2020 07:39) (71 - 89)  BP: 115/74 (06 Mar 2020 07:39) (107/69 - 129/79)  BP(mean): 73 (05 Mar 2020 16:00) (73 - 82)  RR: 16 (06 Mar 2020 07:39) (16 - 22)  SpO2: 95% (06 Mar 2020 07:39) (93% - 100%)    PHYSICAL EXAM:    Constitutional: NAD, well-developed  HEENT: EOMI, throat clear  Neck: No LAD, supple  Respiratory: CTA and P  Cardiovascular: S1 and S2, RRR, no M  Gastrointestinal: BS+, soft, NT/ND, neg HSM,  Extremities: No peripheral edema, neg clubing, cyanosis  Vascular: 2+ peripheral pulses  Neurological: A/O x 3, no focal deficits  Psychiatric: Normal mood, normal affect  Skin: No rashes      LABS:                        9.3    7.72  )-----------( 178      ( 06 Mar 2020 07:04 )             27.8     03-06    142  |  110<H>  |  12  ----------------------------<  119<H>  3.7   |  31  |  0.74    Ca    8.8      06 Mar 2020 07:04  Phos  2.7     03-06  Mg     2.2     03-06    TPro  5.2<L>  /  Alb  2.4<L>  /  TBili  0.2  /  DBili  x   /  AST  28  /  ALT  32  /  AlkPhos  67  03-06          RADIOLOGY & ADDITIONAL TESTS:

## 2020-03-06 NOTE — DISCHARGE NOTE PROVIDER - CARE PROVIDERS DIRECT ADDRESSES
,DirectAddress_Unknown,[email or web address],DirectAddress_Unknown

## 2020-03-06 NOTE — DISCHARGE NOTE PROVIDER - NSDCFUSCHEDAPPT_GEN_ALL_CORE_FT
BENJAMIN ROWLAND ; 06/04/2020 ; [facility] ; [facility]/04/2020 ; [facility] ; 06/04/2020 ; [facility] ; 06/04/2020 ; [facility] ; 06/04/2020 ; [facility]

## 2020-03-06 NOTE — PROGRESS NOTE ADULT - PROBLEM SELECTOR PLAN 1
am Hgb noted  on PPI gtt  consider dc and change to PPI BID   cardio eval noted - monitored off AC  PUD - gastric ulcers and gastropathy and hiatal hernia  PPI - consider PPI BID   s/p EGD  s/p PRBC  I and O  keep Hgb > 7  monitor VS and HD  GI follow up

## 2020-03-06 NOTE — DISCHARGE NOTE PROVIDER - NSDCFUADDINST_GEN_ALL_CORE_FT
please follow up with PMD within one week  follow up with cardiology in one week please follow up with PMD within one week  I personally spoke to Dr. Pina - he can see patient on 3/9/2020 - will need to call to make appt  follow up with cardiology in one week

## 2020-03-06 NOTE — DISCHARGE NOTE PROVIDER - NSDCMRMEDTOKEN_GEN_ALL_CORE_FT
Calcium 600+D oral tablet: 1 tab(s) orally 2 times a day  Carbamide Peroxide 6.5% otic solution: 5 drop(s) to each affected ear 2 times a day  chlorhexadine rinse: 0.5 dose(s) orally 2 times a day Swish and spit out  Daily Multi oral tablet: 1 tab(s) orally once a day  Docusil 100 mg oral capsule: 1 cap(s) orally 2 times a day  ferrous sulfate 325 mg (65 mg elemental iron) oral delayed release tablet: 1 tab(s) orally once a day  Flomax 0.4 mg oral capsule: 1 cap(s) orally once a day  fluticasone 50 mcg/inh nasal spray: 1 spray(s) nasal 2 times a day  fluvoxaMINE extended release: 50 milligram(s) orally 2 times a day  loratadine 10 mg oral tablet: 1 tab(s) orally once a day  niacin 1000 mg oral tablet, extended release: 1 tab(s) orally once a day (at bedtime)  pantoprazole 40 mg oral delayed release tablet: 1 tab(s) orally 2 times a day  pravastatin 40 mg oral tablet: 1 tab(s) orally once a day (at bedtime)  risperiDONE 2 mg oral tablet: 1 tab(s) orally 2 times a day  sucralfate 1 g/10 mL oral suspension: 10 milliliter(s) orally every 6 hours  Tylenol 325 mg oral tablet: 2 tab(s) orally every 6 hours, As Needed  Vitamin B12 100 mcg oral tablet: 1 tab(s) orally once a day Calcium 600+D oral tablet: 1 tab(s) orally 2 times a day  Carbamide Peroxide 6.5% otic solution: 5 drop(s) to each affected ear 2 times a day  chlorhexadine rinse: 0.5 dose(s) orally 2 times a day Swish and spit out  Daily Multi oral tablet: 1 tab(s) orally once a day  Docusil 100 mg oral capsule: 1 cap(s) orally 2 times a day  ferrous sulfate 325 mg (65 mg elemental iron) oral delayed release tablet: 1 tab(s) orally once a day  Flomax 0.4 mg oral capsule: 1 cap(s) orally once a day  fluticasone 50 mcg/inh nasal spray: 1 spray(s) nasal 2 times a day  fluvoxaMINE extended release: 50 milligram(s) orally 2 times a day  loratadine 10 mg oral tablet: 1 tab(s) orally once a day  niacin 1000 mg oral tablet, extended release: 1 tab(s) orally once a day (at bedtime)  pantoprazole 40 mg oral delayed release tablet: 1 tab(s) orally 2 times a day  pravastatin 40 mg oral tablet: 1 tab(s) orally once a day (at bedtime)  risperiDONE 2 mg oral tablet: 1 tab(s) orally 2 times a day  sucralfate 1 g oral tablet: 1 tab(s) orally 4 times a day (before meals and at bedtime)  Tylenol 325 mg oral tablet: 2 tab(s) orally every 6 hours, As Needed  Vitamin B12 100 mcg oral tablet: 1 tab(s) orally once a day

## 2020-03-06 NOTE — DISCHARGE NOTE PROVIDER - INSTRUCTIONS
dysphagia 2 mechanical soft solids-thin liquids resume diet prior to discharge resume diet prior to admission

## 2020-03-06 NOTE — DISCHARGE NOTE PROVIDER - PROVIDER TOKENS
FREE:[LAST:[Avolese],FIRST:[Sung],PHONE:[phone]],FAX:[(   )    -],ADDRESS:[[facility] [address],[address]]],PROVIDER:[TOKEN:[[id number]]],PROVIDER:[TOKEN:[[id number]]] PROVIDER:[TOKEN:[[id number]]],PROVIDER:[TOKEN:[[id number]]],FREE:[LAST:[Yovani],FIRST:[Sung],PHONE:[phone]],FAX:[(   )    -],ADDRESS:[[address] [place] [address]],ESTABLISHEDPATIENT:[T]]

## 2020-03-06 NOTE — DISCHARGE NOTE PROVIDER - CARE PROVIDER_API CALL
Sung Pina  [address]  [place], [place],[address]  Phone: [phone]  Fax: (   )    -  Follow Up Time:     Palla, Venugopal R (MD)  Cardiovascular Disease; Internal Medicine  [address], [place] [id number]  Phone: [phone]  Fax: [phone]  Follow Up Time:     Yovani Castillo)  Gastroenterology  [address] [place] [address]  Phone: [phone]  Fax: [phone]  Follow Up Time: Palla, Venugopal R (MD)  Cardiovascular Disease; Internal Medicine  [address] [place], [place] [id number]  Phone: [phone]  Fax: [phone]  Follow Up Time:     Yovani Castillo (MD)  Gastroenterology  [address] [place] [address]  Phone: [phone]  Fax: [phone]  Follow Up Time:     Sung Pina  [address] [place], [place],[address]  Phone: [phone]  Fax: (   )    -  Established Patient  Follow Up Time:

## 2020-03-06 NOTE — PROGRESS NOTE ADULT - PROBLEM SELECTOR PLAN 1
continue ppi bid and carafate qid  cbc stable  advance diet   can dc in am if stable   hold AC for 7 days

## 2020-03-06 NOTE — PROGRESS NOTE ADULT - SUBJECTIVE AND OBJECTIVE BOX
Date/Time Patient Seen:  		  Referring MD:   Data Reviewed	       Patient is a 64y old  Male who presents with a chief complaint of GI bleed (05 Mar 2020 12:07)      Subjective/HPI     PAST MEDICAL & SURGICAL HISTORY:  Fall (on) (from) other stairs and steps, initial encounter  Mental retardation  Aggressiveness  Agitation  Nonverbal  Autism  Bipolar affective disorder  Schizophrenia, paranoid type  GERD (gastroesophageal reflux disease)  Hypertension  Hyperlipidemia  Hematuria  BPH (benign prostatic hypertrophy) with urinary obstruction  Fracture of left malleolus: repaired in 7/2018  No significant past surgical history        Medication list         MEDICATIONS  (STANDING):  atorvastatin 10 milliGRAM(s) Oral at bedtime  chlorhexidine 2% Cloths 1 Application(s) Topical <User Schedule>  fluticasone propionate 50 MICROgram(s)/spray Nasal Spray 1 Spray(s) Both Nostrils two times a day  fluvoxaMINE 50 milliGRAM(s) Oral two times a day  metoprolol succinate ER 12.5 milliGRAM(s) Oral daily  pantoprazole Infusion 8 mG/Hr (10 mL/Hr) IV Continuous <Continuous>  risperiDONE   Tablet 2 milliGRAM(s) Oral two times a day  sucralfate suspension 1 Gram(s) Oral every 6 hours  tamsulosin 0.4 milliGRAM(s) Oral at bedtime    MEDICATIONS  (PRN):  ondansetron Injectable 4 milliGRAM(s) IV Push every 6 hours PRN Nausea and/or Vomiting         Vitals log        ICU Vital Signs Last 24 Hrs  T(C): 36.8 (06 Mar 2020 07:39), Max: 37.1 (05 Mar 2020 18:34)  T(F): 98.2 (06 Mar 2020 07:39), Max: 98.8 (05 Mar 2020 18:34)  HR: 74 (06 Mar 2020 07:39) (71 - 97)  BP: 115/74 (06 Mar 2020 07:39) (107/69 - 129/79)  BP(mean): 73 (05 Mar 2020 16:00) (73 - 85)  ABP: --  ABP(mean): --  RR: 16 (06 Mar 2020 07:39) (16 - 22)  SpO2: 95% (06 Mar 2020 07:39) (93% - 100%)           Input and Output:  I&O's Detail    05 Mar 2020 07:01  -  06 Mar 2020 07:00  --------------------------------------------------------  IN:    IV PiggyBack: 466.5 mL    lactated ringers.: 300 mL    Oral Fluid: 680 mL    pantoprazole Infusion: 230 mL  Total IN: 1676.5 mL    OUT:  Total OUT: 0 mL    Total NET: 1676.5 mL          Lab Data                        9.3    7.72  )-----------( 178      ( 06 Mar 2020 07:04 )             27.8     03-06    142  |  110<H>  |  12  ----------------------------<  119<H>  3.7   |  31  |  0.74    Ca    8.8      06 Mar 2020 07:04  Phos  2.7     03-06  Mg     2.2     03-06    TPro  5.2<L>  /  Alb  2.4<L>  /  TBili  0.2  /  DBili  x   /  AST  28  /  ALT  32  /  AlkPhos  67  03-06      CARDIAC MARKERS ( 04 Mar 2020 10:09 )  .572 ng/mL / x     / x     / x     / x            Review of Systems	      Objective     Physical Examination  heart s1s2  lung dec BS  abd soft  on room air        Pertinent Lab findings & Imaging      Tegan:  NO   Adequate UO     I&O's Detail    05 Mar 2020 07:01  -  06 Mar 2020 07:00  --------------------------------------------------------  IN:    IV PiggyBack: 466.5 mL    lactated ringers.: 300 mL    Oral Fluid: 680 mL    pantoprazole Infusion: 230 mL  Total IN: 1676.5 mL    OUT:  Total OUT: 0 mL    Total NET: 1676.5 mL               Discussed with:     Cultures:	        Radiology

## 2020-03-06 NOTE — PROGRESS NOTE ADULT - PROBLEM SELECTOR PLAN 1
in setting of Acute GI bleed , anemia , mostly demand ischemia , normal LVEF   continue statin , no ASA due to GI bleed , Hold low dose metoprolol given 3.1 sec pause this AM while awake. will need OP ischemic work up ( not sure whether he will cooperate for it ).

## 2020-03-09 LAB
CULTURE RESULTS: SIGNIFICANT CHANGE UP
CULTURE RESULTS: SIGNIFICANT CHANGE UP
SPECIMEN SOURCE: SIGNIFICANT CHANGE UP
SPECIMEN SOURCE: SIGNIFICANT CHANGE UP

## 2020-03-13 ENCOUNTER — APPOINTMENT (OUTPATIENT)
Dept: CARDIOLOGY | Facility: CLINIC | Age: 65
End: 2020-03-13
Payer: MEDICARE

## 2020-03-13 ENCOUNTER — NON-APPOINTMENT (OUTPATIENT)
Age: 65
End: 2020-03-13

## 2020-03-13 VITALS
WEIGHT: 205 LBS | DIASTOLIC BLOOD PRESSURE: 73 MMHG | HEART RATE: 95 BPM | HEIGHT: 68 IN | SYSTOLIC BLOOD PRESSURE: 117 MMHG | OXYGEN SATURATION: 97 % | BODY MASS INDEX: 31.07 KG/M2

## 2020-03-13 PROCEDURE — 93000 ELECTROCARDIOGRAM COMPLETE: CPT

## 2020-03-13 PROCEDURE — 99214 OFFICE O/P EST MOD 30 MIN: CPT

## 2020-03-13 RX ORDER — RIVAROXABAN 10 MG/1
10 TABLET, FILM COATED ORAL
Qty: 30 | Refills: 3 | Status: DISCONTINUED | COMMUNITY
End: 2020-03-13

## 2020-03-13 RX ORDER — FLUOXETINE HYDROCHLORIDE 40 MG/1
40 CAPSULE ORAL DAILY
Qty: 30 | Refills: 0 | Status: COMPLETED | COMMUNITY
Start: 2020-03-13

## 2020-03-13 RX ORDER — PRAVASTATIN SODIUM 40 MG/1
40 TABLET ORAL DAILY
Qty: 90 | Refills: 1 | Status: ACTIVE | COMMUNITY
Start: 2020-03-13

## 2020-03-13 NOTE — PHYSICAL EXAM
[General Appearance - Well Developed] : well developed [Normal Conjunctiva] : the conjunctiva exhibited no abnormalities [Normal Oral Mucosa] : normal oral mucosa [] : no respiratory distress [Respiration, Rhythm And Depth] : normal respiratory rhythm and effort [Exaggerated Use Of Accessory Muscles For Inspiration] : no accessory muscle use [Auscultation Breath Sounds / Voice Sounds] : lungs were clear to auscultation bilaterally [Chest Palpation] : palpation of the chest revealed no abnormalities [Lungs Percussion] : the lungs were normal to percussion [Heart Rate And Rhythm] : heart rate and rhythm were normal [Heart Sounds] : normal S1 and S2 [Arterial Pulses Normal] : the arterial pulses were normal [Edema] : no peripheral edema present [Systolic grade ___/6] : A grade [unfilled]/6 systolic murmur was heard. [Bowel Sounds] : normal bowel sounds [Abdomen Soft] : soft [Abnormal Walk] : normal gait [Nail Clubbing] : no clubbing of the fingernails [No Anxiety] : not feeling anxious [Normal Appearance] : was normal in appearance [FreeTextEntry1] : multiple scratch marks on both forearms  , right ankle swelling

## 2020-03-13 NOTE — HISTORY OF PRESENT ILLNESS
[FreeTextEntry1] : Patient  with severe autism,hypertension,hyperlipidemia who came for follow up .  patient is non verbal \par patient had DVT to left lower extremity, was on Xarelto been followed by hematologist  presented to office s/p hospitalization for GI bleed, s/p endoscopy found to have gastritis, follows up with GI and PCP. Troponin were mildly elevated during hospitalization with lactic acidosis and anemia. Xarelto on hold as per GI. to restart once cleared.  patient is not communicative  Patient had ekg without ischemic changes and had hyperdynamic  left ventricular function in hospital . \par , \par has   blood  works due next week from PCP.\par \par  \par his blood pressure is controlled ,\par \par

## 2020-04-17 ENCOUNTER — APPOINTMENT (OUTPATIENT)
Dept: CARDIOLOGY | Facility: CLINIC | Age: 65
End: 2020-04-17

## 2020-06-10 NOTE — PATIENT PROFILE ADULT. - ASSIST WITH
A NOTE FROM DR. ARREDONDO AND OUR STAFF     Thank you for being a patient at [facility].  Our goal is to provide the best care possible and for you to be an active and engaged participant in your care.  Please review the information below which contains specific instructions and information regarding the plan outlined today by Dr. Arredondo.  If you have any questions about these instructions or the plan of care that was discussed today, please feel free to call us or reach out via the online [employer] portal.        INSTRUCTIONS AND INFORMATION      TMJ treatment:   Soft foods, no gum chewing, jaw rest, ice jaw (10 minutes on 10 minutes off)        LABS AND TESTING     Were any labs or tests ordered today:  no  · If labs/tests were ordered, we will call you with the results after we have received them and Dr. Arredondo has reviewed them.    · It is our goal to call you with results within 24 hours of us receiving test results, but please note that it can take various lengths of time for us to receive the test results:    · Most blood work comes back within 24 hours, but some tests may take longer.  · Imaging tests (i.e. CT scans, MRIs, and ultrasounds) are reviewed by a radiologist before the results are given to Dr. Arredondo.  This can take a few days.  · Biopsies and cultures can take up to 7 days to receive results.      TIMELINESS     We know your time is valuable and we make every effort to stay on schedule. We can only do this with your help.  We appreciate your cooperation in making every effort to arrive on time so that we can see you at your scheduled time, and keep on schedule for all of our patients following you.  If you are running late for your appointment, we would appreciate a phone call to keep us appraised of your progress.      Please note that if you arrive late for your appointment, we will make an effort to see you but the time allocated for your appointment will not be  extended.  Also, in some cases you may be asked to reschedule your appointment.      ADDITIONAL EDUCATIONAL HANDOUTS/INFORMATION (if applicable)        standing/walking

## 2020-06-19 ENCOUNTER — APPOINTMENT (OUTPATIENT)
Dept: CARDIOLOGY | Facility: CLINIC | Age: 65
End: 2020-06-19
Payer: MEDICARE

## 2020-06-19 ENCOUNTER — NON-APPOINTMENT (OUTPATIENT)
Age: 65
End: 2020-06-19

## 2020-06-19 VITALS
SYSTOLIC BLOOD PRESSURE: 125 MMHG | DIASTOLIC BLOOD PRESSURE: 81 MMHG | OXYGEN SATURATION: 91 % | WEIGHT: 200 LBS | HEIGHT: 68 IN | BODY MASS INDEX: 30.31 KG/M2 | HEART RATE: 89 BPM

## 2020-06-19 DIAGNOSIS — R79.89 OTHER SPECIFIED ABNORMAL FINDINGS OF BLOOD CHEMISTRY: ICD-10-CM

## 2020-06-19 PROCEDURE — 93000 ELECTROCARDIOGRAM COMPLETE: CPT

## 2020-06-19 PROCEDURE — 99214 OFFICE O/P EST MOD 30 MIN: CPT

## 2020-06-19 NOTE — HISTORY OF PRESENT ILLNESS
[FreeTextEntry1] : Patient  with severe autism,hypertension,hyperlipidemia who came for follow up .  patient is non verbal \par patient had DVT to left lower extremity, was on Xarelto s/p GI bleed, s/p endoscopy found to have gastritis, follows up with GI and PCP. Troponin were mildly elevated during hospitalization with lactic acidosis and anemia and was ordered a Nuclear stress test. Was unable to do it as patient was uncooperative.  Xarelto still on hold as per GI. Has appointment next week   patient is not communicative  Patient had ekg without ischemic changes and had hyperdynamic  left ventricular function in hospital . \par , \par Recent   blood  works  PCP reviewed, , HDL 45, LDL 52\par \par  \par his blood pressure is controlled ,\par \par

## 2020-06-19 NOTE — PHYSICAL EXAM
[General Appearance - Well Developed] : well developed [] : no respiratory distress [Normal Oral Mucosa] : normal oral mucosa [Normal Conjunctiva] : the conjunctiva exhibited no abnormalities [Respiration, Rhythm And Depth] : normal respiratory rhythm and effort [Auscultation Breath Sounds / Voice Sounds] : lungs were clear to auscultation bilaterally [Exaggerated Use Of Accessory Muscles For Inspiration] : no accessory muscle use [Lungs Percussion] : the lungs were normal to percussion [Chest Palpation] : palpation of the chest revealed no abnormalities [Heart Rate And Rhythm] : heart rate and rhythm were normal [Systolic grade ___/6] : A grade [unfilled]/6 systolic murmur was heard. [Arterial Pulses Normal] : the arterial pulses were normal [Heart Sounds] : normal S1 and S2 [Edema] : no peripheral edema present [Bowel Sounds] : normal bowel sounds [Abnormal Walk] : normal gait [Abdomen Soft] : soft [No Anxiety] : not feeling anxious [Nail Clubbing] : no clubbing of the fingernails [Normal Appearance] : was normal in appearance [FreeTextEntry1] : multiple scratch marks on both forearms  , right ankle swelling

## 2020-07-28 ENCOUNTER — APPOINTMENT (OUTPATIENT)
Dept: ORTHOPEDIC SURGERY | Facility: CLINIC | Age: 65
End: 2020-07-28
Payer: MEDICARE

## 2020-07-28 DIAGNOSIS — M79.89 OTHER SPECIFIED SOFT TISSUE DISORDERS: ICD-10-CM

## 2020-07-28 PROCEDURE — 99213 OFFICE O/P EST LOW 20 MIN: CPT

## 2020-07-28 PROCEDURE — 73610 X-RAY EXAM OF ANKLE: CPT | Mod: LT

## 2020-09-04 NOTE — SWALLOW BEDSIDE ASSESSMENT ADULT - H & P REVIEW
Quality 110: Preventive Care And Screening: Influenza Immunization: Influenza Immunization previously received during influenza season Quality 111:Pneumonia Vaccination Status For Older Adults: Pneumococcal Vaccination Previously Received Quality 130: Documentation Of Current Medications In The Medical Record: Current Medications Documented Detail Level: Detailed Quality 131: Pain Assessment And Follow-Up: Pain assessment using a standardized tool is documented as negative, no follow-up plan required Quality 226: Preventive Care And Screening: Tobacco Use: Screening And Cessation Intervention: Patient screened for tobacco use and is an ex/non-smoker Quality 431: Preventive Care And Screening: Unhealthy Alcohol Use - Screening: Patient screened for unhealthy alcohol use using a single question and scores less than 2 times per year yes

## 2020-12-14 ENCOUNTER — TRANSCRIPTION ENCOUNTER (OUTPATIENT)
Age: 65
End: 2020-12-14

## 2020-12-14 ENCOUNTER — EMERGENCY (EMERGENCY)
Facility: HOSPITAL | Age: 65
LOS: 1 days | End: 2020-12-14
Attending: EMERGENCY MEDICINE | Admitting: EMERGENCY MEDICINE
Payer: MEDICARE

## 2020-12-14 VITALS
RESPIRATION RATE: 18 BRPM | DIASTOLIC BLOOD PRESSURE: 102 MMHG | SYSTOLIC BLOOD PRESSURE: 170 MMHG | HEART RATE: 107 BPM | OXYGEN SATURATION: 96 % | TEMPERATURE: 98 F | HEIGHT: 68 IN | WEIGHT: 210.98 LBS

## 2020-12-14 VITALS
TEMPERATURE: 98 F | SYSTOLIC BLOOD PRESSURE: 152 MMHG | DIASTOLIC BLOOD PRESSURE: 84 MMHG | RESPIRATION RATE: 16 BRPM | OXYGEN SATURATION: 97 % | HEART RATE: 82 BPM

## 2020-12-14 DIAGNOSIS — S82.892A OTHER FRACTURE OF LEFT LOWER LEG, INITIAL ENCOUNTER FOR CLOSED FRACTURE: Chronic | ICD-10-CM

## 2020-12-14 LAB
ALBUMIN SERPL ELPH-MCNC: 3.5 G/DL — SIGNIFICANT CHANGE UP (ref 3.3–5)
ALP SERPL-CCNC: 104 U/L — SIGNIFICANT CHANGE UP (ref 30–120)
ALT FLD-CCNC: 20 U/L DA — SIGNIFICANT CHANGE UP (ref 10–60)
ANION GAP SERPL CALC-SCNC: 6 MMOL/L — SIGNIFICANT CHANGE UP (ref 5–17)
APPEARANCE UR: CLEAR — SIGNIFICANT CHANGE UP
APTT BLD: 43.4 SEC — HIGH (ref 27.5–35.5)
AST SERPL-CCNC: 20 U/L — SIGNIFICANT CHANGE UP (ref 10–40)
BASOPHILS # BLD AUTO: 0.06 K/UL — SIGNIFICANT CHANGE UP (ref 0–0.2)
BASOPHILS NFR BLD AUTO: 0.6 % — SIGNIFICANT CHANGE UP (ref 0–2)
BILIRUB SERPL-MCNC: 0.3 MG/DL — SIGNIFICANT CHANGE UP (ref 0.2–1.2)
BILIRUB UR-MCNC: NEGATIVE — SIGNIFICANT CHANGE UP
BUN SERPL-MCNC: 13 MG/DL — SIGNIFICANT CHANGE UP (ref 7–23)
CALCIUM SERPL-MCNC: 9.8 MG/DL — SIGNIFICANT CHANGE UP (ref 8.4–10.5)
CHLORIDE SERPL-SCNC: 104 MMOL/L — SIGNIFICANT CHANGE UP (ref 96–108)
CO2 SERPL-SCNC: 28 MMOL/L — SIGNIFICANT CHANGE UP (ref 22–31)
COLOR SPEC: YELLOW — SIGNIFICANT CHANGE UP
CREAT SERPL-MCNC: 0.7 MG/DL — SIGNIFICANT CHANGE UP (ref 0.5–1.3)
D DIMER BLD IA.RAPID-MCNC: 158 NG/ML DDU — SIGNIFICANT CHANGE UP
DIFF PNL FLD: NEGATIVE — SIGNIFICANT CHANGE UP
EOSINOPHIL # BLD AUTO: 0.32 K/UL — SIGNIFICANT CHANGE UP (ref 0–0.5)
EOSINOPHIL NFR BLD AUTO: 2.9 % — SIGNIFICANT CHANGE UP (ref 0–6)
GLUCOSE SERPL-MCNC: 124 MG/DL — HIGH (ref 70–99)
GLUCOSE UR QL: NEGATIVE MG/DL — SIGNIFICANT CHANGE UP
HCT VFR BLD CALC: 36.8 % — LOW (ref 39–50)
HGB BLD-MCNC: 12.6 G/DL — LOW (ref 13–17)
IMM GRANULOCYTES NFR BLD AUTO: 0.5 % — SIGNIFICANT CHANGE UP (ref 0–1.5)
INR BLD: 1.23 RATIO — HIGH (ref 0.88–1.16)
KETONES UR-MCNC: NEGATIVE — SIGNIFICANT CHANGE UP
LEUKOCYTE ESTERASE UR-ACNC: NEGATIVE — SIGNIFICANT CHANGE UP
LYMPHOCYTES # BLD AUTO: 1.71 K/UL — SIGNIFICANT CHANGE UP (ref 1–3.3)
LYMPHOCYTES # BLD AUTO: 15.7 % — SIGNIFICANT CHANGE UP (ref 13–44)
MCHC RBC-ENTMCNC: 30.1 PG — SIGNIFICANT CHANGE UP (ref 27–34)
MCHC RBC-ENTMCNC: 34.2 GM/DL — SIGNIFICANT CHANGE UP (ref 32–36)
MCV RBC AUTO: 87.8 FL — SIGNIFICANT CHANGE UP (ref 80–100)
MONOCYTES # BLD AUTO: 1.12 K/UL — HIGH (ref 0–0.9)
MONOCYTES NFR BLD AUTO: 10.3 % — SIGNIFICANT CHANGE UP (ref 2–14)
NEUTROPHILS # BLD AUTO: 7.64 K/UL — HIGH (ref 1.8–7.4)
NEUTROPHILS NFR BLD AUTO: 70 % — SIGNIFICANT CHANGE UP (ref 43–77)
NITRITE UR-MCNC: NEGATIVE — SIGNIFICANT CHANGE UP
NRBC # BLD: 0 /100 WBCS — SIGNIFICANT CHANGE UP (ref 0–0)
PH UR: 7 — SIGNIFICANT CHANGE UP (ref 5–8)
PLATELET # BLD AUTO: 241 K/UL — SIGNIFICANT CHANGE UP (ref 150–400)
POTASSIUM SERPL-MCNC: 3.7 MMOL/L — SIGNIFICANT CHANGE UP (ref 3.5–5.3)
POTASSIUM SERPL-SCNC: 3.7 MMOL/L — SIGNIFICANT CHANGE UP (ref 3.5–5.3)
PROT SERPL-MCNC: 7.5 G/DL — SIGNIFICANT CHANGE UP (ref 6–8.3)
PROT UR-MCNC: NEGATIVE MG/DL — SIGNIFICANT CHANGE UP
PROTHROM AB SERPL-ACNC: 14.7 SEC — HIGH (ref 10.6–13.6)
RBC # BLD: 4.19 M/UL — LOW (ref 4.2–5.8)
RBC # FLD: 12.1 % — SIGNIFICANT CHANGE UP (ref 10.3–14.5)
SODIUM SERPL-SCNC: 138 MMOL/L — SIGNIFICANT CHANGE UP (ref 135–145)
SP GR SPEC: 1.01 — SIGNIFICANT CHANGE UP (ref 1.01–1.02)
TROPONIN I SERPL-MCNC: 0 NG/ML — LOW (ref 0.02–0.06)
UROBILINOGEN FLD QL: NEGATIVE MG/DL — SIGNIFICANT CHANGE UP
WBC # BLD: 10.9 K/UL — HIGH (ref 3.8–10.5)
WBC # FLD AUTO: 10.9 K/UL — HIGH (ref 3.8–10.5)

## 2020-12-14 PROCEDURE — 93970 EXTREMITY STUDY: CPT | Mod: 26

## 2020-12-14 PROCEDURE — 93010 ELECTROCARDIOGRAM REPORT: CPT

## 2020-12-14 PROCEDURE — 93970 EXTREMITY STUDY: CPT

## 2020-12-14 PROCEDURE — 85610 PROTHROMBIN TIME: CPT

## 2020-12-14 PROCEDURE — 99284 EMERGENCY DEPT VISIT MOD MDM: CPT

## 2020-12-14 PROCEDURE — 85025 COMPLETE CBC W/AUTO DIFF WBC: CPT

## 2020-12-14 PROCEDURE — 80053 COMPREHEN METABOLIC PANEL: CPT

## 2020-12-14 PROCEDURE — 81003 URINALYSIS AUTO W/O SCOPE: CPT

## 2020-12-14 PROCEDURE — 84484 ASSAY OF TROPONIN QUANT: CPT

## 2020-12-14 PROCEDURE — 85730 THROMBOPLASTIN TIME PARTIAL: CPT

## 2020-12-14 PROCEDURE — 93005 ELECTROCARDIOGRAM TRACING: CPT

## 2020-12-14 PROCEDURE — 71045 X-RAY EXAM CHEST 1 VIEW: CPT

## 2020-12-14 PROCEDURE — 36415 COLL VENOUS BLD VENIPUNCTURE: CPT

## 2020-12-14 PROCEDURE — 99284 EMERGENCY DEPT VISIT MOD MDM: CPT | Mod: 25

## 2020-12-14 PROCEDURE — 71045 X-RAY EXAM CHEST 1 VIEW: CPT | Mod: 26

## 2020-12-14 PROCEDURE — 85379 FIBRIN DEGRADATION QUANT: CPT

## 2020-12-14 RX ORDER — AMLODIPINE BESYLATE 2.5 MG/1
5 TABLET ORAL ONCE
Refills: 0 | Status: COMPLETED | OUTPATIENT
Start: 2020-12-14 | End: 2020-12-14

## 2020-12-14 RX ORDER — AMLODIPINE BESYLATE 2.5 MG/1
1 TABLET ORAL
Qty: 10 | Refills: 0
Start: 2020-12-14 | End: 2020-12-23

## 2020-12-14 RX ADMIN — AMLODIPINE BESYLATE 5 MILLIGRAM(S): 2.5 TABLET ORAL at 17:52

## 2020-12-14 NOTE — ED ADULT NURSE NOTE - OBJECTIVE STATEMENT
Amb to ED with counselor from group home- Seen @ Urgent Care for lethargy, HTN & rapid HR today & advised to come to ED for further evaluation. Hx:MR Doesn't answer questions but is cooperative with staff. Color fair. Skin warm & dry to touch. Lungs clear. No SOB, fever, diarrhea. Cm- RSR  @ rate 90 without ectopics noted.

## 2020-12-14 NOTE — ED PROVIDER NOTE - CARE PROVIDER_API CALL
Conrado [facility]  INTERNAL MEDICINE  [address] [place] [id number]  Phone: [phone]  Fax: [phone]  Follow Up Time: 1-3 Days    PMD Dr Meredith,   Phone: (   )    -  Fax: (   )    -  Established Patient  Follow Up Time: 1-3 Days

## 2020-12-14 NOTE — ED ADULT TRIAGE NOTE - CHIEF COMPLAINT QUOTE
as per caregiver " we went to an urgent care because he was looking  lethargic this morning , his pulse was high " Pt sent in from [facility] urgent for hypertension and tachycardia . Pt hx of MR / Self abuse Covid negative at the urgent care

## 2020-12-14 NOTE — ED PROVIDER NOTE - CARE PROVIDERS DIRECT ADDRESSES
[email or web address]

## 2020-12-14 NOTE — ED PROVIDER NOTE - PROGRESS NOTE DETAILS
Scribe AS for Dr. Arce: 66 y/o male with a PMHx of Autism BIB aide for lethargy, high BP x today. Aide states that pt looked lethergic today, pt's Bp was 132/80 and his pulse was increasing so pt was sent to the ED. Pt was seen at  PTA, was sent to the ED. Pt is a poor historian due to being nonverbal, hx taken from aide at bedside. PE: As per aide, pt is back to baseline, no complaints. results discussed with aide, rx norvasc sent to pharmacy, given information for Dr Camp, Cardiologist, call tomorrow to arrange follow up, follow up with pmd Dr Meredith, copy of results given, any concerns return to ED

## 2020-12-14 NOTE — ED ADULT NURSE NOTE - NSIMPLEMENTINTERV_GEN_ALL_ED
Implemented All Universal Safety Interventions:  [place] to call system. Call bell, personal items and telephone within reach. Instruct patient to call for assistance. Room bathroom lighting operational. Non-slip footwear when patient is off stretcher. Physically safe environment: no spills, clutter or unnecessary equipment. Stretcher in lowest position, wheels locked, appropriate side rails in place.

## 2020-12-14 NOTE — ED PROVIDER NOTE - NSFOLLOWUPINSTRUCTIONS_ED_ALL_ED_FT
patient to follow up with cardiologist Dr Clary Camp  follow up with pmd Dr Meredith, call tomorrow to arrange follow up  start norvasc medication for hypertension  any concerns return to ED

## 2020-12-14 NOTE — ED PROVIDER NOTE - NS_ ATTENDINGSCRIBEDETAILS _ED_A_ED_FT
Price Arce MD - The scribe's documentation has been prepared under my direction and personally reviewed by me in its entirety. I confirm that the note above accurately reflects all work, treatment, procedures, and medical decision making performed by me.

## 2020-12-14 NOTE — ED PROVIDER NOTE - PROVIDER TOKENS
PROVIDER:[TOKEN:[[id number]],FOLLOWUP:[1-3 Days]],FREE:[LAST:[PMD Dr Meredith],PHONE:[(   )    -],FAX:[(   )    -],FOLLOWUP:[1-3 Days],ESTABLISHEDPATIENT:[T]]

## 2020-12-14 NOTE — ED PROVIDER NOTE - CLINICAL SUMMARY MEDICAL DECISION MAKING FREE TEXT BOX
mentally challenged male, brought to ED by aide, seen at urgent care today, covid swab negative, sent for htn, tachycardia, hx of dvt, results no dvt, htn medication rx sent to pharmacy, no acute findings on labs and cxr, follow up with cardiologist and pmd

## 2020-12-14 NOTE — ED ADULT NURSE NOTE - CHIEF COMPLAINT QUOTE
as per caregiver " we went to an urgent care because he was looking  lethargic this morning , his pulse was high " Pt sent in from [facility] urgent for hypertension and tachycardia . Pt hx of MR / Self abuse Covid negative at the urgent care

## 2020-12-14 NOTE — ED PROVIDER NOTE - OBJECTIVE STATEMENT
65 y mentally challenged male presents with htn, elevated hr, as per aide patient was seen at urgent care, covid test was negative, sent to ED for htn, tachycardia, states patient today has appeared lethargic, no other symptoms.  PMD Dr Meredith 65 y mentally challenged male presents with htn, elevated hr, as per aide patient was seen at urgent care, covid test was negative, sent to ED for htn, tachycardia, states patient today has appeared lethargic, no other symptoms. as per urgent care notes, rapid covid swab result negative today.   PMD Dr Meredith  PMH:  Aggressiveness    Agitation    Autism    Bipolar affective disorder    BPH (benign prostatic hypertrophy) with urinary obstruction    GERD (gastroesophageal reflux disease)    Hematuria    Hyperlipidemia    Hypertension    Mental retardation    Nonverbal    Schizophrenia, paranoid type

## 2020-12-14 NOTE — ED PROVIDER NOTE - PATIENT PORTAL LINK FT
You can access the FollowMyHealth Patient Portal offered by [facility] by registering at the following website: [email or web address]. By joining [employer]’s FollowMyHealth portal, you will also be able to view your health information using other applications (apps) compatible with our system.

## 2020-12-17 ENCOUNTER — APPOINTMENT (OUTPATIENT)
Dept: CARDIOLOGY | Facility: CLINIC | Age: 65
End: 2020-12-17

## 2020-12-18 ENCOUNTER — APPOINTMENT (OUTPATIENT)
Dept: CARDIOLOGY | Facility: CLINIC | Age: 65
End: 2020-12-18
Payer: MEDICARE

## 2020-12-18 ENCOUNTER — NON-APPOINTMENT (OUTPATIENT)
Age: 65
End: 2020-12-18

## 2020-12-18 VITALS
BODY MASS INDEX: 33.49 KG/M2 | HEIGHT: 68 IN | SYSTOLIC BLOOD PRESSURE: 152 MMHG | DIASTOLIC BLOOD PRESSURE: 92 MMHG | HEART RATE: 108 BPM | OXYGEN SATURATION: 95 % | WEIGHT: 221 LBS

## 2020-12-18 VITALS — SYSTOLIC BLOOD PRESSURE: 142 MMHG | DIASTOLIC BLOOD PRESSURE: 89 MMHG

## 2020-12-18 PROCEDURE — 93000 ELECTROCARDIOGRAM COMPLETE: CPT

## 2020-12-18 PROCEDURE — 99214 OFFICE O/P EST MOD 30 MIN: CPT

## 2020-12-18 NOTE — REASON FOR VISIT
[Follow-Up - From Hospitalization] : follow-up of a recent hospitalization for [Abnormal ECG] : an abnormal ECG [Dyspnea] : dyspnea [Hyperlipidemia] : hyperlipidemia [Hypertension] : hypertension [Medication Management] : Medication management

## 2020-12-18 NOTE — HISTORY OF PRESENT ILLNESS
[FreeTextEntry1] : Patient  with severe autism,hypertension,hyperlipidemia who came for follow up .  patient is non verbal \par patient had DVT to left lower extremity, was on Xarelto s/p GI bleed, s/p endoscopy found to have gastritis,  brought in for follow up with complain that his BP , heart rate is high , was apparently felt  that patietn was breathing fast on 12 /14/20 patient was taken to hospital , patient had negative covid test , was given amlodipine in ER his blood work showed negative troponin , BNP was 200s , his blood pressure is mild elevated  despite on norvasc 5 mg   patient has mild sinus tachycardia possibly due to anxiety , \par \par Patient non communicative , patient did have venous doppler , showed chronic changes no acute DVt shown \par , \par Recent   blood  works  PCP reviewed, , HDL 45, LDL 52\par \par  \par \par \par

## 2020-12-24 NOTE — PHYSICAL EXAM
[Normal Conjunctiva] : the conjunctiva exhibited no abnormalities [General Appearance - Well Developed] : well developed [Normal Oral Mucosa] : normal oral mucosa [] : no respiratory distress [Respiration, Rhythm And Depth] : normal respiratory rhythm and effort [Exaggerated Use Of Accessory Muscles For Inspiration] : no accessory muscle use [Auscultation Breath Sounds / Voice Sounds] : lungs were clear to auscultation bilaterally [Chest Palpation] : palpation of the chest revealed no abnormalities [Lungs Percussion] : the lungs were normal to percussion [Heart Rate And Rhythm] : heart rate and rhythm were normal [Heart Sounds] : normal S1 and S2 [Systolic grade ___/6] : A grade [unfilled]/6 systolic murmur was heard. [Abdomen Soft] : soft [Abdomen Tenderness] : non-tender [Abnormal Walk] : normal gait [Skin Color & Pigmentation] : normal skin color and pigmentation [No Anxiety] : not feeling anxious [Normal Appearance] : was normal in appearance [FreeTextEntry1] : No LE Edema  n/a

## 2021-02-04 ENCOUNTER — APPOINTMENT (OUTPATIENT)
Dept: CARDIOLOGY | Facility: CLINIC | Age: 66
End: 2021-02-04
Payer: MEDICARE

## 2021-02-04 PROCEDURE — 93306 TTE W/DOPPLER COMPLETE: CPT

## 2021-02-12 ENCOUNTER — APPOINTMENT (OUTPATIENT)
Dept: CARDIOLOGY | Facility: CLINIC | Age: 66
End: 2021-02-12
Payer: MEDICARE

## 2021-02-12 ENCOUNTER — NON-APPOINTMENT (OUTPATIENT)
Age: 66
End: 2021-02-12

## 2021-02-12 VITALS
HEART RATE: 75 BPM | OXYGEN SATURATION: 97 % | DIASTOLIC BLOOD PRESSURE: 92 MMHG | BODY MASS INDEX: 33.65 KG/M2 | HEIGHT: 68 IN | SYSTOLIC BLOOD PRESSURE: 143 MMHG | WEIGHT: 222 LBS

## 2021-02-12 VITALS — SYSTOLIC BLOOD PRESSURE: 143 MMHG | DIASTOLIC BLOOD PRESSURE: 80 MMHG

## 2021-02-12 PROCEDURE — 93000 ELECTROCARDIOGRAM COMPLETE: CPT

## 2021-02-12 PROCEDURE — 99214 OFFICE O/P EST MOD 30 MIN: CPT

## 2021-02-12 NOTE — PHYSICAL EXAM
[General Appearance - Well Developed] : well developed [Normal Conjunctiva] : the conjunctiva exhibited no abnormalities [Normal Oral Mucosa] : normal oral mucosa [] : no respiratory distress [Respiration, Rhythm And Depth] : normal respiratory rhythm and effort [Exaggerated Use Of Accessory Muscles For Inspiration] : no accessory muscle use [Auscultation Breath Sounds / Voice Sounds] : lungs were clear to auscultation bilaterally [Lungs Percussion] : the lungs were normal to percussion [Chest Palpation] : palpation of the chest revealed no abnormalities [Heart Rate And Rhythm] : heart rate and rhythm were normal [Heart Sounds] : normal S1 and S2 [Arterial Pulses Normal] : the arterial pulses were normal [Edema] : no peripheral edema present [Systolic grade ___/6] : A grade [unfilled]/6 systolic murmur was heard. [Bowel Sounds] : normal bowel sounds [Abdomen Soft] : soft [Abnormal Walk] : normal gait [Nail Clubbing] : no clubbing of the fingernails [No Anxiety] : not feeling anxious [FreeTextEntry1] : multiple scratch marks on both forearms  , right ankle swelling  [Normal Appearance] : was normal in appearance

## 2021-02-12 NOTE — HISTORY OF PRESENT ILLNESS
[FreeTextEntry1] : Patient  with severe autism,hypertension,hyperlipidemia non verbal , prior hx of  DVT to left lower extremity, was on Xarelto s/p GI bleed, s/p endoscopy found to have gastritis,  brought in for follow up with complain that his BP , heart rate is high , , mild elevated   , his echo showed mild LVH  normal echo \par \par Patient non communicative , patient did have venous doppler , showed chronic changes no acute DVt shown \par , \par Recent   blood  works  PCP reviewed, , HDL 45, LDL 52\par \par  \par \par \par

## 2021-05-13 ENCOUNTER — NON-APPOINTMENT (OUTPATIENT)
Age: 66
End: 2021-05-13

## 2021-05-13 ENCOUNTER — APPOINTMENT (OUTPATIENT)
Dept: CARDIOLOGY | Facility: CLINIC | Age: 66
End: 2021-05-13
Payer: MEDICARE

## 2021-05-13 VITALS
OXYGEN SATURATION: 98 % | HEART RATE: 65 BPM | DIASTOLIC BLOOD PRESSURE: 80 MMHG | HEIGHT: 68 IN | SYSTOLIC BLOOD PRESSURE: 132 MMHG | WEIGHT: 221 LBS | BODY MASS INDEX: 33.49 KG/M2

## 2021-05-13 DIAGNOSIS — E66.3 OVERWEIGHT: ICD-10-CM

## 2021-05-13 PROCEDURE — 99214 OFFICE O/P EST MOD 30 MIN: CPT

## 2021-05-13 PROCEDURE — 93000 ELECTROCARDIOGRAM COMPLETE: CPT

## 2021-05-13 NOTE — CARDIOLOGY SUMMARY
[de-identified] : 5/13/21 normal sinus  [de-identified] : February 4, 2021. Mild left ventricular hypertrophy, normal left ventricle systolic function, ejection fraction 67%. Mild diastolic dysfunction. Mild tricuspid regurgitation

## 2021-05-13 NOTE — HISTORY OF PRESENT ILLNESS
[FreeTextEntry1] : Patient  with severe autism,hypertension,hyperlipidemia non verbal , prior hx of  DVT to left lower extremity, was on Xarelto s/p GI bleed, s/p endoscopy found to have gastritis,  brought in for follow up . Patient blood pressure , heart rate is controlled on home monitoring  , \par \par Patient non communicative , patient did have venous doppler , showed chronic changes no acute DVT shown\par \par Patient had echo showed Mild LVH normal LVEF trace AI  \par , \par Recent   blood  works  PCP reviewed, , HDL 45, LDL 52 done on 4/2020 ,  no recent blood work \par \par  \par \par \par

## 2021-05-13 NOTE — DISCUSSION/SUMMARY
[FreeTextEntry1] : Hypertension: hypertensive heart disease  Controlled, encourage patient to follow low-salt diet continue medication.\par \par Hyperlipidemia: Controlled. Continue pravastatin, Niaspan.  will obtain blood work , \par \par History of chronic left lower extremity DVT off of anticoagulation, patient did have a GI bleed while on anticoagulation\par \par Cardiac murmur: Possibly due to aortic sclerosis.\par \par Obesity : recommend nutritional evaluation , calorie intake reduction , to decrease weight \par \par follow up after 6 months \par

## 2021-05-13 NOTE — REASON FOR VISIT
[Hyperlipidemia] : hyperlipidemia [Hypertension] : hypertension [Formal Caregiver] : formal caregiver [Structural Heart and Valve Disease] : structural heart and valve disease

## 2021-09-03 ENCOUNTER — NON-APPOINTMENT (OUTPATIENT)
Age: 66
End: 2021-09-03

## 2021-09-03 ENCOUNTER — APPOINTMENT (OUTPATIENT)
Dept: CARDIOLOGY | Facility: CLINIC | Age: 66
End: 2021-09-03
Payer: MEDICARE

## 2021-09-03 VITALS
BODY MASS INDEX: 32.74 KG/M2 | OXYGEN SATURATION: 96 % | DIASTOLIC BLOOD PRESSURE: 82 MMHG | WEIGHT: 216 LBS | HEIGHT: 68 IN | HEART RATE: 64 BPM | SYSTOLIC BLOOD PRESSURE: 119 MMHG

## 2021-09-03 PROCEDURE — 99215 OFFICE O/P EST HI 40 MIN: CPT

## 2021-09-03 PROCEDURE — 93000 ELECTROCARDIOGRAM COMPLETE: CPT

## 2021-09-03 RX ORDER — SUCRALFATE 1 G/1
1 TABLET ORAL 4 TIMES DAILY
Qty: 360 | Refills: 1 | Status: DISCONTINUED | COMMUNITY
End: 2021-09-03

## 2021-09-03 RX ORDER — PANTOPRAZOLE SODIUM 40 MG/1
40 TABLET, DELAYED RELEASE ORAL DAILY
Refills: 0 | Status: DISCONTINUED | COMMUNITY
End: 2021-09-03

## 2021-09-03 RX ORDER — CHLORHEXIDINE GLUCONATE 4 %
1000 LIQUID (ML) TOPICAL DAILY
Refills: 0 | Status: DISCONTINUED | COMMUNITY
End: 2021-09-03

## 2021-09-03 NOTE — REASON FOR VISIT
[Structural Heart and Valve Disease] : structural heart and valve disease [Hyperlipidemia] : hyperlipidemia [Hypertension] : hypertension [Formal Caregiver] : formal caregiver

## 2021-09-03 NOTE — DISCUSSION/SUMMARY
[FreeTextEntry1] : \par chest pain ? poor historian , appears to be GI origin ,prior hx of GERD , normal ekg doubt cardiac    will obtain chemical stress test  as patient has multiple risk factors for CAD , refer to GI \par \par Hypertension: hypertensive heart disease  Controlled, encourage patient to follow low-salt diet continue medication.\par \par Hyperlipidemia: Controlled. Continue pravastatin, Niaspan.  will obtain blood work , \par \par History of chronic left lower extremity DVT off of anticoagulation, patient did have a GI bleed while on anticoagulation\par \par Cardiac murmur: Possibly due to aortic sclerosis.\par \par Obesity : recommend nutritional evaluation , calorie intake reduction , to decrease weight \par \par follow up after the test \par

## 2021-09-03 NOTE — PHYSICAL EXAM
[Well Developed] : well developed [Well Nourished] : well nourished [No Acute Distress] : no acute distress [Normal Conjunctiva] : normal conjunctiva [Normal Venous Pressure] : normal venous pressure [No Carotid Bruit] : no carotid bruit [Normal Rate] : normal [Normal S1] : normal S1 [Normal S2] : normal S2 [II] : a grade 2 [No Pitting Edema] : no pitting edema present [2+] : left 2+ [No Abnormalities] : the abdominal aorta was not enlarged and no bruit was heard [Clear Lung Fields] : clear lung fields [Good Air Entry] : good air entry [No Respiratory Distress] : no respiratory distress  [Soft] : abdomen soft [Non Tender] : non-tender [No Masses/organomegaly] : no masses/organomegaly [Normal Bowel Sounds] : normal bowel sounds [Normal Gait] : normal gait [No Edema] : no edema [No Cyanosis] : no cyanosis [No Clubbing] : no clubbing [No Varicosities] : no varicosities [No Rash] : no rash [No Skin Lesions] : no skin lesions [Moves all extremities] : moves all extremities [Cognitive Impairment] : cognitive impairment [S3] : no S3 [S4] : no S4 [Right Carotid Bruit] : no bruit heard over the right carotid [Left Carotid Bruit] : no bruit heard over the left carotid [Right Femoral Bruit] : no bruit heard over the right femoral artery [Left Femoral Bruit] : no bruit heard over the left femoral artery

## 2021-09-03 NOTE — CARDIOLOGY SUMMARY
[de-identified] : 9/3/21  normal sinus rhythm  [de-identified] : feb 4 2021 Mild LVH  Normal EF 67% Mild DD , Mild TR

## 2021-09-03 NOTE — HISTORY OF PRESENT ILLNESS
[FreeTextEntry1] : Patient  with severe autism,hypertension,hyperlipidemia non verbal , prior hx of  DVT to left lower extremity, was on Xarelto s/p GI bleed, s/p endoscopy found to have gastritis,  brought in for follow up by care giver says patient holds his chest  while eating and stop eating after that , some time he reject food , did not look like having shortness of breath or sweating ,  able to walk without any problem \par \par \par  Patient blood pressure , heart rate is controlled on home monitoring  , \par \par Patient non communicative , patient did have venous doppler , showed chronic changes no acute DVT shown\par \par Patient had echo showed Mild LVH normal LVEF trace AI , cortic sclerosis  \par , \par Recent   blood  works  PCP reviewed, , HDL 45, LDL 52 done on 4/2020 ,  no recent blood work \par \par  \par \par \par

## 2021-09-28 ENCOUNTER — APPOINTMENT (OUTPATIENT)
Dept: GASTROENTEROLOGY | Facility: CLINIC | Age: 66
End: 2021-09-28
Payer: MEDICARE

## 2021-09-28 VITALS
BODY MASS INDEX: 33.19 KG/M2 | DIASTOLIC BLOOD PRESSURE: 85 MMHG | HEIGHT: 68 IN | HEART RATE: 71 BPM | WEIGHT: 219 LBS | OXYGEN SATURATION: 97 % | SYSTOLIC BLOOD PRESSURE: 168 MMHG

## 2021-09-28 PROCEDURE — 99204 OFFICE O/P NEW MOD 45 MIN: CPT

## 2021-09-28 NOTE — HISTORY OF PRESENT ILLNESS
[de-identified] :  Patient is a pleasant 66 year old male with severe autism, hypertension, hyperlipidemia non verbal who is unable to provide history. He presents today along with the medical aid that takes care of him at the group home as well as the RN at the facility. they bring along his charts. He has a hx of DVT to left lower extremity, was on Xarelto. He had GI bleed, s/p endoscopy found to have gastritis. Per RN this was 2 years ago. He had another episode of bleeding for which he was seen in the ED. he did not have endoscopy and was stabilized, discharged, Xarelto was discontinued. He is brought in today because his care giver says patient holds his chest while eating and stop eating after that. He was having trouble eating solids and he is placed on a mechanical soft diet. Per record and recent Cardiology visit, he has been rejecting food and appears to be uncomfortable eating. He has been having small daily BMs, per aid brown and soft/ formed. no blood or melena.

## 2021-09-28 NOTE — CONSULT LETTER
[Dear  ___] : Dear  [unfilled], [Consult Letter:] : I had the pleasure of evaluating your patient, [unfilled]. [Consult Closing:] : Thank you very much for allowing me to participate in the care of this patient.  If you have any questions, please do not hesitate to contact me. [Sincerely,] : Sincerely, [FreeTextEntry1] : This is a 66 year old male with severe impairment due to autism who presents with his caretakers, both the RN and primary medical aid that oversees his care. he has had prior GI bleed due to gastritis and HH. His last proceed was over 2 years ago per RN and he more recently was admitted with bleeding, no endoscopy performed and Xarelto stopped. He is brought in today due to inability to eat. he has been observed to have discomfort during meals, intolerance to food, dysphagia for which he has been placed on a mechanical soft diet. The patient is nonverbal and his brother would consent for him for elective procedures and group home staff members make medical decisions for him otherwise. \par We discussed today the options of endoscopy for purposes of evaluating him discomfort and addressing his dysphagia, like dilating a stricture. This would be a quality of life measure for him. However, before planning such EGD, we will obtain less invasive testing to help determine etiology of his symptoms. I will obtain labs including liver enzymes and pancreatic enzymes, blood counts and obtain xray to look for ileus/ partial obstruction. \par \par Reviewed and reconciled medications, allergies, PMHx, PSHx, SocHx, FMHx. Reviewed imaging, blood work, diagnostic testing, discussed with patient. Further recommendations pending results of above work up and evaluation.\par  [FreeTextEntry3] : Caitlin Adams MD\par Gastroenterology, Hepatology and Motility\par

## 2021-09-28 NOTE — PHYSICAL EXAM
[Normal Rate] : normal [Normal S1] : normal S1 [Normal S2] : normal S2 [No Pitting Edema] : no pitting edema present [Soft] : abdomen soft [Non Tender] : non-tender [No Masses/organomegaly] : no masses/organomegaly [Normal Bowel Sounds] : normal bowel sounds [Normal Gait] : normal gait [No Clubbing] : no clubbing [No Varicosities] : no varicosities [No Rash] : no rash [No Skin Lesions] : no skin lesions [Moves all extremities] : moves all extremities [Cognitive Impairment] : cognitive impairment [Right Carotid Bruit] : no bruit heard over the right carotid [Left Carotid Bruit] : no bruit heard over the left carotid [Right Femoral Bruit] : no bruit heard over the right femoral artery [Left Femoral Bruit] : no bruit heard over the left femoral artery

## 2021-09-28 NOTE — REASON FOR VISIT
[Initial Evaluation] : an initial evaluation [Formal Caregiver] : formal caregiver [Other: _____] : [unfilled] [FreeTextEntry1] : anemia, recurrent GI bleed, previously on anticoagulation, inability to eat, abdominal pain, dysphagia.

## 2021-09-28 NOTE — ASSESSMENT
[FreeTextEntry1] : This is a 66 year old male with severe impairment due to autism who presents with his caretakers, both the RN and primary medical aid that oversees his care. he has had prior GI bleed due to gastritis and HH. His last proceed was over 2 years ago per RN and he more recently was admitted with bleeding, no endoscopy performed and Xarelto stopped. He is brought in today due to inability to eat. he has been observed to have discomfort during meals, intolerance to food, dysphagia for which he has been placed on a mechanical soft diet. The patient is nonverbal and his brother would consent for him for elective procedures and group home staff members make medical decisions for him otherwise. \par We discussed today the options of endoscopy for purposes of evaluating him discomfort and addressing his dysphagia, like dilating a stricture. This would be a quality of life measure for him. However, before planning such EGD, we will obtain less invasive testing to help determine etiology of his symptoms. I will obtain labs including liver enzymes and pancreatic enzymes, blood counts and obtain xray to look for ileus/ partial obstruction. \par \par Reviewed and reconciled medications, allergies, PMHx, PSHx, SocHx, FMHx. Reviewed imaging, blood work, diagnostic testing, discussed with patient. Further recommendations pending results of above work up and evaluation.\par

## 2021-10-18 ENCOUNTER — APPOINTMENT (OUTPATIENT)
Dept: CARDIOLOGY | Facility: CLINIC | Age: 66
End: 2021-10-18
Payer: MEDICARE

## 2021-10-18 ENCOUNTER — OUTPATIENT (OUTPATIENT)
Dept: OUTPATIENT SERVICES | Facility: HOSPITAL | Age: 66
LOS: 1 days | End: 2021-10-18
Payer: MEDICARE

## 2021-10-18 ENCOUNTER — APPOINTMENT (OUTPATIENT)
Dept: CT IMAGING | Facility: CLINIC | Age: 66
End: 2021-10-18
Payer: MEDICARE

## 2021-10-18 ENCOUNTER — APPOINTMENT (OUTPATIENT)
Dept: PULMONOLOGY | Facility: CLINIC | Age: 66
End: 2021-10-18
Payer: MEDICARE

## 2021-10-18 ENCOUNTER — NON-APPOINTMENT (OUTPATIENT)
Age: 66
End: 2021-10-18

## 2021-10-18 VITALS
HEART RATE: 106 BPM | HEIGHT: 68 IN | OXYGEN SATURATION: 97 % | SYSTOLIC BLOOD PRESSURE: 126 MMHG | BODY MASS INDEX: 33.95 KG/M2 | WEIGHT: 224 LBS | DIASTOLIC BLOOD PRESSURE: 84 MMHG

## 2021-10-18 VITALS
BODY MASS INDEX: 33.95 KG/M2 | HEART RATE: 132 BPM | OXYGEN SATURATION: 96 % | HEIGHT: 68 IN | DIASTOLIC BLOOD PRESSURE: 87 MMHG | WEIGHT: 224 LBS | SYSTOLIC BLOOD PRESSURE: 139 MMHG

## 2021-10-18 DIAGNOSIS — S82.892A OTHER FRACTURE OF LEFT LOWER LEG, INITIAL ENCOUNTER FOR CLOSED FRACTURE: Chronic | ICD-10-CM

## 2021-10-18 DIAGNOSIS — R06.02 SHORTNESS OF BREATH: ICD-10-CM

## 2021-10-18 LAB
ALBUMIN SERPL ELPH-MCNC: 4.4 G/DL
ALBUMIN SERPL ELPH-MCNC: 4.6 G/DL
ALP BLD-CCNC: 141 U/L
ALP BLD-CCNC: 144 U/L
ALT SERPL-CCNC: 16 U/L
ALT SERPL-CCNC: 18 U/L
AMYLASE/CREAT SERPL: 38 U/L
ANION GAP SERPL CALC-SCNC: 13 MMOL/L
AST SERPL-CCNC: 22 U/L
AST SERPL-CCNC: 22 U/L
BASOPHILS # BLD AUTO: 0.07 K/UL
BASOPHILS NFR BLD AUTO: 0.7 %
BILIRUB DIRECT SERPL-MCNC: 0.1 MG/DL
BILIRUB INDIRECT SERPL-MCNC: 0.1 MG/DL
BILIRUB SERPL-MCNC: 0.2 MG/DL
BILIRUB SERPL-MCNC: <0.2 MG/DL
BUN SERPL-MCNC: 10 MG/DL
CALCIUM SERPL-MCNC: 10.3 MG/DL
CHLORIDE SERPL-SCNC: 104 MMOL/L
CHOLEST SERPL-MCNC: 158 MG/DL
CO2 SERPL-SCNC: 24 MMOL/L
CREAT SERPL-MCNC: 0.7 MG/DL
EOSINOPHIL # BLD AUTO: 0.35 K/UL
EOSINOPHIL NFR BLD AUTO: 3.6 %
FERRITIN SERPL-MCNC: 22 NG/ML
GLUCOSE SERPL-MCNC: 105 MG/DL
HCT VFR BLD CALC: 38.2 %
HDLC SERPL-MCNC: 46 MG/DL
HGB BLD-MCNC: 12.6 G/DL
IGA SER QL IEP: 122 MG/DL
IMM GRANULOCYTES NFR BLD AUTO: 0.3 %
IRON SERPL-MCNC: 62 UG/DL
LDLC SERPL CALC-MCNC: 81 MG/DL
LPL SERPL-CCNC: 19 U/L
LYMPHOCYTES # BLD AUTO: 2.2 K/UL
LYMPHOCYTES NFR BLD AUTO: 22.9 %
MAN DIFF?: NORMAL
MCHC RBC-ENTMCNC: 28.4 PG
MCHC RBC-ENTMCNC: 33 GM/DL
MCV RBC AUTO: 86 FL
MONOCYTES # BLD AUTO: 0.7 K/UL
MONOCYTES NFR BLD AUTO: 7.3 %
NEUTROPHILS # BLD AUTO: 6.24 K/UL
NEUTROPHILS NFR BLD AUTO: 65.2 %
NONHDLC SERPL-MCNC: 112 MG/DL
PLATELET # BLD AUTO: 308 K/UL
POTASSIUM SERPL-SCNC: 4.1 MMOL/L
PROT SERPL-MCNC: 6.9 G/DL
PROT SERPL-MCNC: 7.1 G/DL
RBC # BLD: 4.44 M/UL
RBC # FLD: 12 %
SODIUM SERPL-SCNC: 141 MMOL/L
TRIGL SERPL-MCNC: 153 MG/DL
TTG IGA SER IA-ACNC: <1.2 U/ML
TTG IGA SER-ACNC: NEGATIVE
WBC # FLD AUTO: 9.59 K/UL

## 2021-10-18 PROCEDURE — 71275 CT ANGIOGRAPHY CHEST: CPT

## 2021-10-18 PROCEDURE — 94762 N-INVAS EAR/PLS OXIMTRY CONT: CPT

## 2021-10-18 PROCEDURE — 93000 ELECTROCARDIOGRAM COMPLETE: CPT

## 2021-10-18 PROCEDURE — 99204 OFFICE O/P NEW MOD 45 MIN: CPT

## 2021-10-18 PROCEDURE — 71275 CT ANGIOGRAPHY CHEST: CPT | Mod: 26,MH

## 2021-10-18 PROCEDURE — 99214 OFFICE O/P EST MOD 30 MIN: CPT

## 2021-10-18 NOTE — PROCEDURE
[FreeTextEntry1] : attempted exercise oximetry with patient however he was noted to have an elevated heart rate up to 150 with O2 sat of 94%, walked for about 1 min with these vitals then had patient sit back down.  Per RN he never has elevated heart rate.

## 2021-10-18 NOTE — HISTORY OF PRESENT ILLNESS
[TextBox_4] : 66M with developmental disability, accompanied by RN, hx of DVT in 2018 (unclear why) off ac now due to GIB in 2020.  Last DVT study in 2020 showed resolution of DVT.  Here because RN noticed he seems more winded with exertion lately.  Aids from his group home also noticed he was snoring very loudly last night and didn't sleep well.  \par \par no prior pulmonary history

## 2021-10-18 NOTE — DISCUSSION/SUMMARY
[FreeTextEntry1] : H/O SOB/DVT: Nuclear stress test scheduled for 10/28/21, Echocardiogram 2/4/21 reviewed NL LV SYS FX,  mild DD/LVH, Had seen Pulmonary today 2/2 SOB CXR/overnight pulse oximetry and CTA chest ordered; after seeing pulmonary today for this SOB he attempted exercise oximetry however he was noted to have an elevated heart rate up to 150 with O2 sat of 94%, walked for about 1 min with rest  BPM;  CTA chest today 10/18/21  neg for PE, + RUL nodule/atelectasis, Multinodular thyroid, Moderate Hiatal hernia CTA chest neg for PE, + RUL nodule/atelectasis, Multinodular thyroid, Moderate Hiatal hernia; have advised follow up with PCP in group home and suggest in addition to Pulmonary evaluation  advised consult with Endocrine and GI ( Currently seeing GI ) \par \par Tachycardia: 24 HR holter monitor applied will return for stress test as scheduled additional labs ordered encourage PO fluids\par \par Hypertension: Controlled \par \par Hyperlipidemia: Continue statin will obtain blood work , \par \par History of chronic left lower extremity DVT off of anticoagulation, patient did have a GI bleed while on anticoagulation\par \par Cardiac murmur: Possibly due to aortic sclerosis.\par \par Obesity : recommend nutritional evaluation , calorie intake reduction , to decrease weight \par \par follow one week also advised Home care RN to monitor HR/BP/temp 4x/day \par

## 2021-10-18 NOTE — ASSESSMENT
[FreeTextEntry1] : given history of DVT, off ac now, elevated HR, SOB will send for CTA now to rule out PE.  Also suggest cardiac evaluation.\par \par also discussed possibility of COLLINS with RN given pt's body habitus, she feels he will not be compliant with home sleep study but may be agreeable to overnight oximetry.  At follow up with arrange for this.

## 2021-10-18 NOTE — HISTORY OF PRESENT ILLNESS
[FreeTextEntry1] : 65 Y/O male PMH: severe autism no verbal, HTN, HLD, DVT LLE no oral AC had  GI bleed while on anticoagulation ( s/p endoscopy 2019 found to have gastritis ) upon his last visit he was pointing to his chest with eating  which home RN advised these symptoms resolved once resuming protonix he is currently under the care of GI, he is here today after seeing pulmonary for this SOB which he attempted exercise oximetry with patient however he was noted to have an elevated heart rate up to 150 with O2 sat of 94%, walked for about 1 min with rest  CTA chest today 10/18/21  neg for PE, + RUL nodule/atelectasis, Multinodular thyroid, Moderate Hiatal hernia\par \par Echo 2/4/21 NL LV SYS FX, Mild LVH, Mild AI, Mild DD\par Stress test scheduled \par \par Patient blood pressure , heart rate is controlled on home monitoring  , \par \par Patient non communicative \par  \par \par \par

## 2021-10-18 NOTE — PHYSICAL EXAM
[Normal Conjunctiva] : normal conjunctiva [Normal S1, S2] : normal S1, S2 [Soft] : abdomen soft [Non Tender] : non-tender [Normal Gait] : normal gait [No Edema] : no edema [Normal] : moves all extremities, no focal deficits, normal speech [de-identified] : Overweight [de-identified] : Mentally challenged

## 2021-10-19 ENCOUNTER — NON-APPOINTMENT (OUTPATIENT)
Age: 66
End: 2021-10-19

## 2021-10-20 ENCOUNTER — TRANSCRIPTION ENCOUNTER (OUTPATIENT)
Age: 66
End: 2021-10-20

## 2021-10-20 ENCOUNTER — APPOINTMENT (OUTPATIENT)
Dept: GASTROENTEROLOGY | Facility: AMBULATORY MEDICAL SERVICES | Age: 66
End: 2021-10-20

## 2021-10-20 LAB — GGT SERPL-CCNC: 19 U/L

## 2021-10-21 ENCOUNTER — LABORATORY RESULT (OUTPATIENT)
Age: 66
End: 2021-10-21

## 2021-10-21 ENCOUNTER — EMERGENCY (EMERGENCY)
Facility: HOSPITAL | Age: 66
LOS: 1 days | Discharge: HOME | End: 2021-10-21
Attending: EMERGENCY MEDICINE | Admitting: EMERGENCY MEDICINE
Payer: MEDICARE

## 2021-10-21 VITALS
HEIGHT: 68 IN | TEMPERATURE: 99 F | RESPIRATION RATE: 20 BRPM | OXYGEN SATURATION: 95 % | SYSTOLIC BLOOD PRESSURE: 160 MMHG | WEIGHT: 214.95 LBS | HEART RATE: 92 BPM | DIASTOLIC BLOOD PRESSURE: 100 MMHG

## 2021-10-21 VITALS
SYSTOLIC BLOOD PRESSURE: 156 MMHG | DIASTOLIC BLOOD PRESSURE: 90 MMHG | TEMPERATURE: 99 F | RESPIRATION RATE: 19 BRPM | HEART RATE: 90 BPM | OXYGEN SATURATION: 98 %

## 2021-10-21 DIAGNOSIS — S82.892A OTHER FRACTURE OF LEFT LOWER LEG, INITIAL ENCOUNTER FOR CLOSED FRACTURE: Chronic | ICD-10-CM

## 2021-10-21 LAB
ALBUMIN SERPL ELPH-MCNC: 3.4 G/DL — SIGNIFICANT CHANGE UP (ref 3.3–5)
ALP SERPL-CCNC: 123 U/L — HIGH (ref 30–120)
ALT FLD-CCNC: 27 U/L DA — SIGNIFICANT CHANGE UP (ref 10–60)
ANION GAP SERPL CALC-SCNC: 10 MMOL/L — SIGNIFICANT CHANGE UP (ref 5–17)
AST SERPL-CCNC: 22 U/L — SIGNIFICANT CHANGE UP (ref 10–40)
BASOPHILS # BLD AUTO: 0.05 K/UL — SIGNIFICANT CHANGE UP (ref 0–0.2)
BASOPHILS NFR BLD AUTO: 0.5 % — SIGNIFICANT CHANGE UP (ref 0–2)
BILIRUB SERPL-MCNC: 0.2 MG/DL — SIGNIFICANT CHANGE UP (ref 0.2–1.2)
BLD GP AB SCN SERPL QL: SIGNIFICANT CHANGE UP
BUN SERPL-MCNC: 9 MG/DL — SIGNIFICANT CHANGE UP (ref 7–23)
CALCIUM SERPL-MCNC: 9.7 MG/DL — SIGNIFICANT CHANGE UP (ref 8.4–10.5)
CHLORIDE SERPL-SCNC: 101 MMOL/L — SIGNIFICANT CHANGE UP (ref 96–108)
CO2 SERPL-SCNC: 26 MMOL/L — SIGNIFICANT CHANGE UP (ref 22–31)
CREAT SERPL-MCNC: 0.74 MG/DL — SIGNIFICANT CHANGE UP (ref 0.5–1.3)
ENDOMYSIUM IGA SER QL: NEGATIVE
ENDOMYSIUM IGA TITR SER: NORMAL
EOSINOPHIL # BLD AUTO: 0.18 K/UL — SIGNIFICANT CHANGE UP (ref 0–0.5)
EOSINOPHIL NFR BLD AUTO: 1.7 % — SIGNIFICANT CHANGE UP (ref 0–6)
GLUCOSE SERPL-MCNC: 112 MG/DL — HIGH (ref 70–99)
HCT VFR BLD CALC: 38.3 % — LOW (ref 39–50)
HGB BLD-MCNC: 12.8 G/DL — LOW (ref 13–17)
IMM GRANULOCYTES NFR BLD AUTO: 0.2 % — SIGNIFICANT CHANGE UP (ref 0–1.5)
LYMPHOCYTES # BLD AUTO: 1.84 K/UL — SIGNIFICANT CHANGE UP (ref 1–3.3)
LYMPHOCYTES # BLD AUTO: 17 % — SIGNIFICANT CHANGE UP (ref 13–44)
MCHC RBC-ENTMCNC: 28.4 PG — SIGNIFICANT CHANGE UP (ref 27–34)
MCHC RBC-ENTMCNC: 33.4 GM/DL — SIGNIFICANT CHANGE UP (ref 32–36)
MCV RBC AUTO: 85.1 FL — SIGNIFICANT CHANGE UP (ref 80–100)
MONOCYTES # BLD AUTO: 0.79 K/UL — SIGNIFICANT CHANGE UP (ref 0–0.9)
MONOCYTES NFR BLD AUTO: 7.3 % — SIGNIFICANT CHANGE UP (ref 2–14)
NEUTROPHILS # BLD AUTO: 7.94 K/UL — HIGH (ref 1.8–7.4)
NEUTROPHILS NFR BLD AUTO: 73.3 % — SIGNIFICANT CHANGE UP (ref 43–77)
NRBC # BLD: 0 /100 WBCS — SIGNIFICANT CHANGE UP (ref 0–0)
NT-PROBNP SERPL-SCNC: 146 PG/ML — HIGH (ref 0–125)
OB PNL STL: NEGATIVE — SIGNIFICANT CHANGE UP
PLATELET # BLD AUTO: 298 K/UL — SIGNIFICANT CHANGE UP (ref 150–400)
POTASSIUM SERPL-MCNC: 3.7 MMOL/L — SIGNIFICANT CHANGE UP (ref 3.5–5.3)
POTASSIUM SERPL-SCNC: 3.7 MMOL/L — SIGNIFICANT CHANGE UP (ref 3.5–5.3)
PROT SERPL-MCNC: 7.3 G/DL — SIGNIFICANT CHANGE UP (ref 6–8.3)
RBC # BLD: 4.5 M/UL — SIGNIFICANT CHANGE UP (ref 4.2–5.8)
RBC # FLD: 11.9 % — SIGNIFICANT CHANGE UP (ref 10.3–14.5)
SARS-COV-2 RNA SPEC QL NAA+PROBE: SIGNIFICANT CHANGE UP
SODIUM SERPL-SCNC: 137 MMOL/L — SIGNIFICANT CHANGE UP (ref 135–145)
WBC # BLD: 10.82 K/UL — HIGH (ref 3.8–10.5)
WBC # FLD AUTO: 10.82 K/UL — HIGH (ref 3.8–10.5)

## 2021-10-21 PROCEDURE — 82272 OCCULT BLD FECES 1-3 TESTS: CPT

## 2021-10-21 PROCEDURE — 36415 COLL VENOUS BLD VENIPUNCTURE: CPT

## 2021-10-21 PROCEDURE — 93005 ELECTROCARDIOGRAM TRACING: CPT

## 2021-10-21 PROCEDURE — 93010 ELECTROCARDIOGRAM REPORT: CPT

## 2021-10-21 PROCEDURE — 86901 BLOOD TYPING SEROLOGIC RH(D): CPT

## 2021-10-21 PROCEDURE — 71045 X-RAY EXAM CHEST 1 VIEW: CPT | Mod: 26

## 2021-10-21 PROCEDURE — 86850 RBC ANTIBODY SCREEN: CPT

## 2021-10-21 PROCEDURE — 71045 X-RAY EXAM CHEST 1 VIEW: CPT

## 2021-10-21 PROCEDURE — 83880 ASSAY OF NATRIURETIC PEPTIDE: CPT

## 2021-10-21 PROCEDURE — 85025 COMPLETE CBC W/AUTO DIFF WBC: CPT

## 2021-10-21 PROCEDURE — 86900 BLOOD TYPING SEROLOGIC ABO: CPT

## 2021-10-21 PROCEDURE — 80053 COMPREHEN METABOLIC PANEL: CPT

## 2021-10-21 PROCEDURE — 99284 EMERGENCY DEPT VISIT MOD MDM: CPT

## 2021-10-21 PROCEDURE — 87635 SARS-COV-2 COVID-19 AMP PRB: CPT

## 2021-10-21 PROCEDURE — 99284 EMERGENCY DEPT VISIT MOD MDM: CPT | Mod: 25

## 2021-10-21 NOTE — ED PROVIDER NOTE - CLINICAL SUMMARY MEDICAL DECISION MAKING FREE TEXT BOX
67 yo male with MR, nonverbal at baseline, autism, GERD, BPH, HTN, HLD, GI bleed, DVT (not on AC) sent to ED from group  home for labored breathing, elevated HR and concern for GI bleed. Patient was scheduled for EDG yesterday however patient ate and was unable to get procedure. Patient was evaluated by pulm on Monday, had CTA chest which was neg for PE. No fall or trauma. Patient tolerating PO. no vomiting. no visible blood in stool or black stool. PE: as above. A/P: ekg, labs, cxr, guaiac, reassess. [place]: 65 yo male with MR, nonverbal at baseline, autism, GERD, BPH, HTN, HLD, GI bleed, DVT (not on AC) sent to ED from group  home for labored breathing, elevated HR and concern for GI bleed. Patient was scheduled for EDG yesterday however patient ate and was unable to get procedure. Patient was evaluated by pulm on Monday, had CTA chest which was neg for PE. No fall or trauma. Patient tolerating PO. no vomiting. no visible blood in stool or black stool. PE: as above. A/P: ekg, labs, cxr, guaiac, reassess.    pt is comfortable in ED, no signs of distress, guaiac negative, Hgb stable, pt stable for discharge.

## 2021-10-21 NOTE — ED PROVIDER NOTE - OBJECTIVE STATEMENT
65 yo male with MR, nonverbal at baseline, autism, GERD, BPH, HTN, HLD, GI bleed, DVT (not on AC) sent to ED from group  home for labored breathing, elevated HR and concern for GI bleed. Patient was scheduled for EDG yesterday however patient ate and was unable to get procedure. Patient was evaluated by pulm on Monday, had CTA chest which was neg for PE. No fall or trauma. Patient tolerating PO. no vomiting. no visible blood in stool or black stool. 67 yo male with MR, nonverbal at baseline, autism, GERD, BPH, HTN, HLD, GI bleed, DVT (not on AC) sent to ED from group  home for labored breathing, elevated HR and concern for GI bleed as per nursing staff at [facility]. Patient was scheduled for EDG yesterday however patient ate and was unable to get procedure. Patient was evaluated by pulm on Monday, had CTA chest which was neg for PE. No fall or trauma. Patient tolerating PO. no vomiting. no visible blood in stool or black stool.

## 2021-10-21 NOTE — ED ADULT NURSE NOTE - OBJECTIVE STATEMENT
pt is A&ox4, brought in by his caregiver for checkup, pt is nonverbal, able to express himself with actions, looks pleasant, denies any pain by nodding, abd soft on touch. non distended, resp even and unlabored, nad noted, caregiver updated on the plan fo care, verbalizes understanding. Nurse at the group home was updated with the plan of care, will continue to monitor

## 2021-10-21 NOTE — ED PROVIDER NOTE - NSFOLLOWUPINSTRUCTIONS_ED_ALL_ED_FT
-- Follow up with your outpatient work up.  ED Lab work is attached.    -- Return to the ER for worsening or persistent symptoms, and/or ANY NEW OR CONCERNING SYMPTOMS.    -- If you have difficulty following up, please call: [phone] to obtain a [facility] doctor or specialist who takes your insurance in your area.

## 2021-10-21 NOTE — ED PROVIDER NOTE - GASTROINTESTINAL, MLM
Abdomen soft, non-tender, no guarding. Abdomen soft, non-tender, no guarding. Rectal: no hemorrhoids, no fissures, brown stool, no melena

## 2021-10-21 NOTE — ED ADULT TRIAGE NOTE - CHIEF COMPLAINT QUOTE
was scheduled for EGD yesterday but staff fed pt and wants today  states having gastric distress and elevated heartrate

## 2021-10-22 ENCOUNTER — APPOINTMENT (OUTPATIENT)
Dept: ULTRASOUND IMAGING | Facility: CLINIC | Age: 66
End: 2021-10-22
Payer: MEDICARE

## 2021-10-22 ENCOUNTER — OUTPATIENT (OUTPATIENT)
Dept: OUTPATIENT SERVICES | Facility: HOSPITAL | Age: 66
LOS: 1 days | End: 2021-10-22
Payer: MEDICARE

## 2021-10-22 ENCOUNTER — NON-APPOINTMENT (OUTPATIENT)
Age: 66
End: 2021-10-22

## 2021-10-22 ENCOUNTER — RESULT REVIEW (OUTPATIENT)
Age: 66
End: 2021-10-22

## 2021-10-22 DIAGNOSIS — S82.892A OTHER FRACTURE OF LEFT LOWER LEG, INITIAL ENCOUNTER FOR CLOSED FRACTURE: Chronic | ICD-10-CM

## 2021-10-22 DIAGNOSIS — R06.02 SHORTNESS OF BREATH: ICD-10-CM

## 2021-10-22 LAB
ALBUMIN SERPL ELPH-MCNC: 4.2 G/DL
ALP BLD-CCNC: 132 U/L
ALT SERPL-CCNC: 19 U/L
ANION GAP SERPL CALC-SCNC: 16 MMOL/L
AST SERPL-CCNC: 20 U/L
B BURGDOR IGG+IGM SER QL IB: NORMAL
BASOPHILS # BLD AUTO: 0.06 K/UL
BASOPHILS NFR BLD AUTO: 0.5 %
BILIRUB SERPL-MCNC: 0.2 MG/DL
BUN SERPL-MCNC: 9 MG/DL
CALCIUM SERPL-MCNC: 10.1 MG/DL
CHLORIDE SERPL-SCNC: 101 MMOL/L
CO2 SERPL-SCNC: 23 MMOL/L
CREAT SERPL-MCNC: 0.73 MG/DL
EOSINOPHIL # BLD AUTO: 0.27 K/UL
EOSINOPHIL NFR BLD AUTO: 2.4 %
GLUCOSE SERPL-MCNC: 122 MG/DL
HCT VFR BLD CALC: 39.6 %
HGB BLD-MCNC: 13 G/DL
IMM GRANULOCYTES NFR BLD AUTO: 0.3 %
LYMPHOCYTES # BLD AUTO: 1.77 K/UL
LYMPHOCYTES NFR BLD AUTO: 15.5 %
MAGNESIUM SERPL-MCNC: 2.1 MG/DL
MAN DIFF?: NORMAL
MCHC RBC-ENTMCNC: 28.4 PG
MCHC RBC-ENTMCNC: 32.8 GM/DL
MCV RBC AUTO: 86.7 FL
MONOCYTES # BLD AUTO: 0.79 K/UL
MONOCYTES NFR BLD AUTO: 6.9 %
NEUTROPHILS # BLD AUTO: 8.52 K/UL
NEUTROPHILS NFR BLD AUTO: 74.4 %
PLATELET # BLD AUTO: 323 K/UL
POTASSIUM SERPL-SCNC: 4.2 MMOL/L
PROT SERPL-MCNC: 6.7 G/DL
RBC # BLD: 4.57 M/UL
RBC # FLD: 12.2 %
SODIUM SERPL-SCNC: 140 MMOL/L
T3 SERPL-MCNC: 94 NG/DL
T4 SERPL-MCNC: 7.5 UG/DL
TSH SERPL-ACNC: 1.52 UIU/ML
WBC # FLD AUTO: 11.44 K/UL

## 2021-10-22 PROCEDURE — 93970 EXTREMITY STUDY: CPT | Mod: 26

## 2021-10-22 PROCEDURE — 93970 EXTREMITY STUDY: CPT

## 2021-10-25 ENCOUNTER — APPOINTMENT (OUTPATIENT)
Dept: PULMONOLOGY | Facility: CLINIC | Age: 66
End: 2021-10-25
Payer: MEDICARE

## 2021-10-25 VITALS
OXYGEN SATURATION: 96 % | BODY MASS INDEX: 32.74 KG/M2 | DIASTOLIC BLOOD PRESSURE: 71 MMHG | SYSTOLIC BLOOD PRESSURE: 142 MMHG | WEIGHT: 216 LBS | HEIGHT: 68 IN | HEART RATE: 84 BPM

## 2021-10-25 PROCEDURE — 99214 OFFICE O/P EST MOD 30 MIN: CPT

## 2021-10-26 ENCOUNTER — APPOINTMENT (OUTPATIENT)
Dept: CARDIOLOGY | Facility: CLINIC | Age: 66
End: 2021-10-26
Payer: MEDICARE

## 2021-10-26 ENCOUNTER — NON-APPOINTMENT (OUTPATIENT)
Age: 66
End: 2021-10-26

## 2021-10-26 VITALS
HEIGHT: 68 IN | OXYGEN SATURATION: 95 % | WEIGHT: 218 LBS | BODY MASS INDEX: 33.04 KG/M2 | DIASTOLIC BLOOD PRESSURE: 74 MMHG | HEART RATE: 92 BPM | SYSTOLIC BLOOD PRESSURE: 112 MMHG

## 2021-10-26 PROCEDURE — 93224 XTRNL ECG REC UP TO 48 HRS: CPT

## 2021-10-26 NOTE — ASSESSMENT
[FreeTextEntry1] : probable COLLINS given body habitus, snoring and O2 desat at night\par \par suggest trial of overnight supplemental O2, will order\par will repeat overnight oximetry in 1 mo\par \par cont fu with cardiology

## 2021-10-26 NOTE — HISTORY OF PRESENT ILLNESS
[TextBox_4] : doing better than last visit\par is scheduled for cardiac stress/holter\par \par did overnight oximetry\par desat below 88% for 5 min \par qualifies for overnight O2\par would not tolerate home sleep study given severe autism

## 2021-10-26 NOTE — PROCEDURE
[FreeTextEntry1] : Reviewed:\par EXAM: CT ANGIO CHEST PULM ART [facility]AWIC\par \par \par PROCEDURE DATE: 10/18/2021\par \par \par \par INTERPRETATION: CTA CHEST\par \par INDICATION: tachycardia, obliterating 94%, difficulty breathing, history of DVT\par \par TECHNIQUE: Axial images of the chest were obtained after the administration of 90 mL of Ominipaque 350. Maximum intensity projection images were generated.\par \par COMPARISON: None.\par \par FINDINGS:\par \par Pulmonary arteries: No pulmonary embolism. Normal caliber main pulmonary artery.\par \par Lungs, airways and pleura: Patent airways to the segmental bronchi. Subsegmental atelectasis in the right upper, middle, and lower lobes. 5 mm nodule in the right upper lobe (10-38). Unremarkable pleura.\par \par Lymph nodes and mediastinum: Mildly enlarged multinodular thyroid. No enlarged lymph nodes. Moderate hiatal hernia.\par \par Heart, pericardium, and vasculature: Normal heart size. No pericardial effusion. Normal caliber aorta.\par \par Bones and soft tissues: Degenerative changes of the spine.\par \par Other: Unremarkable partially included upper abdomen.\par \par \par IMPRESSION:\par \par No pulmonary embolism or other acute pulmonary process.\par \par 5 mm right upper lobe nodule. 1 year follow-up is recommended.\par \par --- End of Report ---\par \par \par \par PADMINI MARTIN M.D., ATTENDING RADIOGIST\par This document has been electronically signed. Oct 18 2021 2:11PM

## 2021-10-27 ENCOUNTER — NON-APPOINTMENT (OUTPATIENT)
Age: 66
End: 2021-10-27

## 2021-10-28 ENCOUNTER — NON-APPOINTMENT (OUTPATIENT)
Age: 66
End: 2021-10-28

## 2021-10-28 ENCOUNTER — APPOINTMENT (OUTPATIENT)
Dept: CARDIOLOGY | Facility: CLINIC | Age: 66
End: 2021-10-28
Payer: MEDICARE

## 2021-10-28 PROCEDURE — 93015 CV STRESS TEST SUPVJ I&R: CPT

## 2021-10-28 PROCEDURE — 78452 HT MUSCLE IMAGE SPECT MULT: CPT

## 2021-10-28 PROCEDURE — A9500: CPT

## 2021-11-02 ENCOUNTER — APPOINTMENT (OUTPATIENT)
Dept: CARDIOLOGY | Facility: CLINIC | Age: 66
End: 2021-11-02
Payer: MEDICARE

## 2021-11-02 VITALS
BODY MASS INDEX: 33.04 KG/M2 | HEART RATE: 99 BPM | OXYGEN SATURATION: 95 % | DIASTOLIC BLOOD PRESSURE: 82 MMHG | WEIGHT: 218 LBS | SYSTOLIC BLOOD PRESSURE: 128 MMHG | HEIGHT: 68 IN

## 2021-11-02 DIAGNOSIS — R06.02 SHORTNESS OF BREATH: ICD-10-CM

## 2021-11-02 PROCEDURE — 99214 OFFICE O/P EST MOD 30 MIN: CPT

## 2021-11-02 NOTE — DISCUSSION/SUMMARY
[FreeTextEntry1] : \par chest pain ? poor historian , appears to be GI origin ,prior hx of GERD with hiatal hernia  , normal ekg doubt cardiac   no evidence of ischemia on stress test , continue PPI \par \par Hypertension: hypertensive heart disease  Controlled, encourage patient to follow low-salt diet continue medication.\par \par Hyperlipidemia: Controlled. Continue pravastatin, Niaspan.  will obtain blood work , \par \par History of chronic left lower extremity DVT off of anticoagulation, patient did have a GI bleed while on anticoagulation\par \par Cardiac murmur: Possibly due to aortic sclerosis.\par \par Obesity : recommend nutritional evaluation , calorie intake reduction , to decrease weight \par \par \par follow up after  6 months

## 2021-11-02 NOTE — HISTORY OF PRESENT ILLNESS
[FreeTextEntry1] : Patient  with severe autism,hypertension,hyperlipidemia non verbal , prior hx of  DVT to left lower extremity, was on Xarelto s/p GI bleed, s/p endoscopy found to have gastritis, hiatal hernia GERD  brought in for follow up , patient was evaluated with pulmonary , patient was noted to have nocturnal desaturation was recommended to use nocturnal oxygen supplements . patient did have nuclear stress test showed no significant ischemia , though patient was moving while on scanning table . \par \par Patient did have holter monitor , for few hours showed no significant tachy or larry arrhythmia \par \par \par  Patient blood pressure , heart rate is controlled on home monitoring  , \par \par Patient non communicative , patient did have venous doppler , showed chronic changes no acute DVT shown\par \par Patient had echo showed Mild LVH normal LVEF trace AI , aortic sclerosis  \par , \par Recent   blood  works  PCP reviewed, , HDL 45, LDL 52 done on 4/2020 ,  no recent blood work \par \par  \par \par \par

## 2021-11-02 NOTE — CARDIOLOGY SUMMARY
[de-identified] : 9/3/21  normal sinus rhythm  [de-identified] : 10/28/21 no evidence of ischemia on chemical nuclear stress test ( significant motion  due to patient factors )  [de-identified] : feb 4 2021 Mild LVH  Normal EF 67% Mild DD , Mild TR [de-identified] : 10/18/21 CT angio  No PE

## 2021-11-29 ENCOUNTER — APPOINTMENT (OUTPATIENT)
Dept: PULMONOLOGY | Facility: CLINIC | Age: 66
End: 2021-11-29
Payer: MEDICARE

## 2021-11-29 VITALS
HEIGHT: 68 IN | BODY MASS INDEX: 32.74 KG/M2 | OXYGEN SATURATION: 95 % | SYSTOLIC BLOOD PRESSURE: 146 MMHG | HEART RATE: 73 BPM | DIASTOLIC BLOOD PRESSURE: 86 MMHG | WEIGHT: 216 LBS

## 2021-11-29 PROCEDURE — 99214 OFFICE O/P EST MOD 30 MIN: CPT

## 2021-11-29 NOTE — ASSESSMENT
[FreeTextEntry1] : cont to use oxygen during sleep\par will confirm O2 supplementation is adequate with another overnight oximetry while he is wearing 2L of O2

## 2021-11-29 NOTE — PROCEDURE
[FreeTextEntry1] : Reviewed:\par EXAM: CT ANGIO CHEST PULM ART WAWIC\par \par \par PROCEDURE DATE: 10/18/2021\par \par \par \par INTERPRETATION: CTA CHEST\par \par INDICATION: tachycardia, obliterating 94%, difficulty breathing, history of DVT\par \par TECHNIQUE: Axial images of the chest were obtained after the administration of 90 mL of Ominipaque 350. Maximum intensity projection images were generated.\par \par COMPARISON: None.\par \par FINDINGS:\par \par Pulmonary arteries: No pulmonary embolism. Normal caliber main pulmonary artery.\par \par Lungs, airways and pleura: Patent airways to the segmental bronchi. Subsegmental atelectasis in the right upper, middle, and lower lobes. 5 mm nodule in the right upper lobe (10-38). Unremarkable pleura.\par \par Lymph nodes and mediastinum: Mildly enlarged multinodular thyroid. No enlarged lymph nodes. Moderate hiatal hernia.\par \par Heart, pericardium, and vasculature: Normal heart size. No pericardial effusion. Normal caliber aorta.\par \par Bones and soft tissues: Degenerative changes of the spine.\par \par Other: Unremarkable partially included upper abdomen.\par \par \par IMPRESSION:\par \par No pulmonary embolism or other acute pulmonary process.\par \par 5 mm right upper lobe nodule. 1 year follow-up is recommended.\par \par --- End of Report ---\par \par \par \par PADMINI AMRTIN M.D., ATTENDING RADIOGIST\par This document has been electronically signed. Oct 18 2021 2:11PM

## 2022-01-18 ENCOUNTER — APPOINTMENT (OUTPATIENT)
Dept: GASTROENTEROLOGY | Facility: CLINIC | Age: 67
End: 2022-01-18
Payer: MEDICARE

## 2022-01-18 VITALS
BODY MASS INDEX: 32.58 KG/M2 | DIASTOLIC BLOOD PRESSURE: 84 MMHG | SYSTOLIC BLOOD PRESSURE: 156 MMHG | OXYGEN SATURATION: 95 % | WEIGHT: 215 LBS | HEIGHT: 68 IN | HEART RATE: 66 BPM

## 2022-01-18 PROCEDURE — 99214 OFFICE O/P EST MOD 30 MIN: CPT

## 2022-01-21 NOTE — ASSESSMENT
[FreeTextEntry1] : This is a pleasant 66 year old male with severe impairment due to autism who presents with his caretaker for follow up and to schedule his EGD. he presented to his procedure after having stopped by at a fast food restaurant because of miscommunication with the car service and caretakers accompanying him that day. I discussed instructions with his caretaker today and he will reschedule. Reason for EGD as follows:\par Henrique he has had prior GI bleed due to gastritis and HH. His last proceed was over 2 years ago per RN and he more recently was admitted with bleeding, no endoscopy performed and Xarelto stopped. He is brought in today due to inability to eat. he has been observed to have discomfort during meals, intolerance to food, dysphagia for which he has been placed on a mechanical soft diet. The patient is nonverbal and his brother would consent for him for elective procedures and group home staff members make medical decisions for him otherwise. \par We discussed today the options of endoscopy for purposes of evaluating him discomfort and addressing his dysphagia, like dilating a stricture. This would be a quality of life measure for him. However, before planning such EGD, we will obtain less invasive testing to help determine etiology of his symptoms. I will obtain labs including liver enzymes and pancreatic enzymes, blood counts and obtain xray to look for ileus/ partial obstruction. \par \par Reviewed and reconciled medications, allergies, PMHx, PSHx, SocHx, FMHx. Reviewed imaging, blood work, diagnostic testing, discussed with patient. Further recommendations pending results of above work up and evaluation.\par

## 2022-01-21 NOTE — HISTORY OF PRESENT ILLNESS
[de-identified] :  Patient is a pleasant 66 year old male with severe autism, hypertension, hyperlipidemia non verbal who is unable to provide history. He presents today along with the medical aid that takes care of him at the group home as well as the RN at the facility. they bring along his charts. He has a hx of DVT to left lower extremity, was on Xarelto. He had GI bleed, s/p endoscopy found to have gastritis. Per RN this was 2 years ago. He had another episode of bleeding for which he was seen in the ED. he did not have endoscopy and was stabilized, discharged, Xarelto was discontinued. He is brought in today because his care giver says patient holds his chest while eating and stop eating after that. He was having trouble eating solids and he is placed on a mechanical soft diet. Per record and recent Cardiology visit, he has been rejecting food and appears to be uncomfortable eating. He has been having small daily BMs, per aid brown and soft/ formed. no blood or melena.

## 2022-01-21 NOTE — PHYSICAL EXAM
[Normal Rate] : normal [Normal S1] : normal S1 [Normal S2] : normal S2 [No Pitting Edema] : no pitting edema present [Clear Lung Fields] : clear lung fields [Soft] : abdomen soft [Non Tender] : non-tender [Normal Bowel Sounds] : normal bowel sounds [Normal Gait] : normal gait [No Rash] : no rash [Cognitive Impairment] : cognitive impairment [Right Carotid Bruit] : no bruit heard over the right carotid [Left Carotid Bruit] : no bruit heard over the left carotid [Right Femoral Bruit] : no bruit heard over the right femoral artery [Left Femoral Bruit] : no bruit heard over the left femoral artery

## 2022-01-21 NOTE — REASON FOR VISIT
[Follow-Up: _____] : a [unfilled] follow-up visit [Formal Caregiver] : formal caregiver [FreeTextEntry1] : anemia, recurrent GI bleed, previously on anticoagulation, inability to eat, abdominal pain, dysphagia.  normal...

## 2022-02-15 ENCOUNTER — RESULT REVIEW (OUTPATIENT)
Age: 67
End: 2022-02-15

## 2022-02-16 ENCOUNTER — APPOINTMENT (OUTPATIENT)
Dept: GASTROENTEROLOGY | Facility: AMBULATORY MEDICAL SERVICES | Age: 67
End: 2022-02-16
Payer: MEDICARE

## 2022-02-16 PROCEDURE — 43239 EGD BIOPSY SINGLE/MULTIPLE: CPT

## 2022-02-17 ENCOUNTER — NON-APPOINTMENT (OUTPATIENT)
Age: 67
End: 2022-02-17

## 2022-02-19 ENCOUNTER — NON-APPOINTMENT (OUTPATIENT)
Age: 67
End: 2022-02-19

## 2022-04-07 ENCOUNTER — APPOINTMENT (OUTPATIENT)
Dept: RHEUMATOLOGY | Facility: CLINIC | Age: 67
End: 2022-04-07

## 2022-04-08 ENCOUNTER — APPOINTMENT (OUTPATIENT)
Dept: PULMONOLOGY | Facility: CLINIC | Age: 67
End: 2022-04-08

## 2022-04-11 ENCOUNTER — APPOINTMENT (OUTPATIENT)
Dept: GASTROENTEROLOGY | Facility: CLINIC | Age: 67
End: 2022-04-11
Payer: MEDICARE

## 2022-04-11 PROCEDURE — 99214 OFFICE O/P EST MOD 30 MIN: CPT

## 2022-04-11 NOTE — CONSULT LETTER
[Dear  ___] : Dear  [unfilled], [Sincerely,] : Sincerely, [Courtesy Letter:] : I had the pleasure of seeing your patient, [unfilled], in my office today. [Consult Closing:] : Thank you very much for allowing me to participate in the care of this patient.  If you have any questions, please do not hesitate to contact me. [FreeTextEntry1] : This is a pleasant 67 year old male with severe impairment due to autism who presents with his caretaker for follow up and to review results of his EGD. He has an esophageal stricture that was dilated at the time and he tolerated this well. He also had mild gastritis. antral biopsies were negative.. I spoke with his brother at the time of his EGD and he has decided with his Cardiologist to stay off on AC and has since not had bleeding, Hgb stable.   He will continue current regimen, follow up in 1 year, sooner if any recurrent/ new symptoms\par \par Reviewed and reconciled medications, allergies, PMHx, PSHx, SocHx, FMHx. Reviewed imaging, blood work, diagnostic testing, discussed with patient. Further recommendations pending results of above work up and evaluation.\par  [FreeTextEntry3] : Caitlin Adams MD\par Gastroenterology, Hepatology and Motility\par

## 2022-04-11 NOTE — ASSESSMENT
[FreeTextEntry1] : This is a pleasant 67 year old male with severe impairment due to autism who presents with his caretaker for follow up and to review results of his EGD. He has an esophageal stricture that was dilated at the time and he tolerated this well. He also had mild gastritis. antral biopsies were negative.. I spoke with his brother at the time of his EGD and he has decided with his Cardiologist to stay off on AC and has since not had bleeding, Hgb stable.   He will continue current regimen, follow up in 1 year, sooner if any recurrent/ new symptoms\par \par Reviewed and reconciled medications, allergies, PMHx, PSHx, SocHx, FMHx. Reviewed imaging, blood work, diagnostic testing, discussed with patient. Further recommendations pending results of above work up and evaluation.\par

## 2022-04-11 NOTE — REASON FOR VISIT
[Follow-Up: _____] : a [unfilled] follow-up visit [Formal Caregiver] : formal caregiver [FreeTextEntry1] : anemia, recurrent GI bleed, previously on anticoagulation, inability to eat, abdominal pain, dysphagia.

## 2022-04-11 NOTE — HISTORY OF PRESENT ILLNESS
[de-identified] :  Patient is a pleasant 67 year old male with severe autism, hypertension, hyperlipidemia non verbal who is unable to provide history. He presents today along with the medical aid that takes care of him at the group home. they bring along his charts. Aid reports he is doing well. no observed food intolerance, pain or bowel issues. He has EGD and biopsies.\par  He has a hx of DVT to left lower extremity, was on Xarelto. He had GI bleed, s/p endoscopy found to have gastritis. Per RN this was 2 years ago. He had another episode of bleeding for which he was seen in the ED. he did not have endoscopy and was stabilized, discharged, Xarelto was discontinued. He is brought in today because his care giver says patient holds his chest while eating and stop eating after that. He was having trouble eating solids and he is placed on a mechanical soft diet. Per record and recent Cardiology visit, he has been rejecting food and appears to be uncomfortable eating. He has been having small daily BMs, per aid brown and soft/ formed. no blood or melena.

## 2022-04-14 NOTE — ED ADULT NURSE NOTE - SUICIDE SCREENING QUESTION 2
Worse TSH. Recheck 2 months. Increase synthroid to 50 mcg. If too high of dose may need to alternate dosing such as 50 mcg on MWF and 25 mcg all other days etc.    Andrew Mccollum MD  [facility]     Patient unable to complete

## 2022-04-22 ENCOUNTER — APPOINTMENT (OUTPATIENT)
Dept: RHEUMATOLOGY | Facility: CLINIC | Age: 67
End: 2022-04-22
Payer: MEDICARE

## 2022-04-22 VITALS
TEMPERATURE: 97.2 F | SYSTOLIC BLOOD PRESSURE: 164 MMHG | WEIGHT: 208 LBS | DIASTOLIC BLOOD PRESSURE: 93 MMHG | HEART RATE: 81 BPM | BODY MASS INDEX: 31.52 KG/M2 | HEIGHT: 68 IN | OXYGEN SATURATION: 97 %

## 2022-04-22 PROCEDURE — 99203 OFFICE O/P NEW LOW 30 MIN: CPT

## 2022-04-25 LAB
25(OH)D3 SERPL-MCNC: 32 NG/ML
ALBUMIN SERPL ELPH-MCNC: 4.4 G/DL
ALP BLD-CCNC: 130 U/L
ALT SERPL-CCNC: 17 U/L
ANION GAP SERPL CALC-SCNC: 13 MMOL/L
AST SERPL-CCNC: 19 U/L
BASOPHILS # BLD AUTO: 0.06 K/UL
BASOPHILS NFR BLD AUTO: 0.7 %
BILIRUB SERPL-MCNC: 0.2 MG/DL
BUN SERPL-MCNC: 11 MG/DL
CALCIUM SERPL-MCNC: 10 MG/DL
CALCIUM SERPL-MCNC: 10 MG/DL
CHLORIDE SERPL-SCNC: 104 MMOL/L
CO2 SERPL-SCNC: 24 MMOL/L
CREAT SERPL-MCNC: 0.65 MG/DL
EGFR: 102 ML/MIN/1.73M2
EOSINOPHIL # BLD AUTO: 0.23 K/UL
EOSINOPHIL NFR BLD AUTO: 2.6 %
GLUCOSE SERPL-MCNC: 164 MG/DL
HCT VFR BLD CALC: 40.2 %
HGB BLD-MCNC: 13.4 G/DL
IMM GRANULOCYTES NFR BLD AUTO: 0.2 %
LYMPHOCYTES # BLD AUTO: 1.59 K/UL
LYMPHOCYTES NFR BLD AUTO: 17.7 %
MAGNESIUM SERPL-MCNC: 2 MG/DL
MAN DIFF?: NORMAL
MCHC RBC-ENTMCNC: 29.5 PG
MCHC RBC-ENTMCNC: 33.3 GM/DL
MCV RBC AUTO: 88.5 FL
MONOCYTES # BLD AUTO: 0.64 K/UL
MONOCYTES NFR BLD AUTO: 7.1 %
NEUTROPHILS # BLD AUTO: 6.42 K/UL
NEUTROPHILS NFR BLD AUTO: 71.7 %
PARATHYROID HORMONE INTACT: 39 PG/ML
PHOSPHATE SERPL-MCNC: 2.8 MG/DL
PLATELET # BLD AUTO: 281 K/UL
POTASSIUM SERPL-SCNC: 4.2 MMOL/L
PROT SERPL-MCNC: 6.8 G/DL
RBC # BLD: 4.54 M/UL
RBC # FLD: 12.5 %
SODIUM SERPL-SCNC: 140 MMOL/L
WBC # FLD AUTO: 8.96 K/UL

## 2022-04-27 ENCOUNTER — NON-APPOINTMENT (OUTPATIENT)
Age: 67
End: 2022-04-27

## 2022-04-29 LAB
ALP BLD-CCNC: 126 IU/L
ALP BONE CFR SERPL: 31 %
ALP INTEST CFR SERPL: 5 %
ALP LIVER CFR SERPL: 64 %

## 2022-05-03 ENCOUNTER — APPOINTMENT (OUTPATIENT)
Dept: RHEUMATOLOGY | Facility: CLINIC | Age: 67
End: 2022-05-03
Payer: MEDICARE

## 2022-05-03 PROCEDURE — 99441: CPT | Mod: 95

## 2022-05-05 ENCOUNTER — NON-APPOINTMENT (OUTPATIENT)
Age: 67
End: 2022-05-05

## 2022-05-05 ENCOUNTER — APPOINTMENT (OUTPATIENT)
Dept: CARDIOLOGY | Facility: CLINIC | Age: 67
End: 2022-05-05
Payer: MEDICARE

## 2022-05-05 VITALS
HEART RATE: 60 BPM | BODY MASS INDEX: 30.87 KG/M2 | SYSTOLIC BLOOD PRESSURE: 147 MMHG | OXYGEN SATURATION: 95 % | DIASTOLIC BLOOD PRESSURE: 80 MMHG | WEIGHT: 203 LBS

## 2022-05-05 VITALS — DIASTOLIC BLOOD PRESSURE: 76 MMHG | SYSTOLIC BLOOD PRESSURE: 120 MMHG

## 2022-05-05 PROCEDURE — 93000 ELECTROCARDIOGRAM COMPLETE: CPT

## 2022-05-05 PROCEDURE — 99214 OFFICE O/P EST MOD 30 MIN: CPT

## 2022-05-05 NOTE — HISTORY OF PRESENT ILLNESS
[FreeTextEntry1] : Patient  with severe autism,hypertension,hyperlipidemia non verbal , prior hx of  DVT to left lower extremity, was on Xarelto s/p GI bleed, s/p endoscopy found to have gastritis, hiatal hernia GERD  brought in for follow up  by caregiver , patient had no new problem \par \par \par patient was evaluated with pulmonary , patient was noted to have nocturnal desaturation was recommended to use nocturnal oxygen supplements . patient did have nuclear stress test showed no significant ischemia , though patient was moving while on scanning table . \par \par Patient did have holter monitor , for few hours showed no significant tachy or larry arrhythmia \par \par  Patient blood pressure , heart rate is controlled on home monitoring  , \par \par Patient non communicative , patient did have venous doppler , showed chronic changes no acute DVT shown\par \par Patient had echo showed Mild LVH normal LVEF trace AI , aortic sclerosis  \par , \par Recent   blood  works october 2021  normal lipid profile on medication \par \par  \par \par \par

## 2022-05-05 NOTE — CARDIOLOGY SUMMARY
[de-identified] : 5/5/22   normal sinus rhythm  [de-identified] : 10/28/21 no evidence of ischemia on chemical nuclear stress test ( significant motion  due to patient factors )  [de-identified] : feb 4 2021 Mild LVH  Normal EF 67% Mild DD , Mild TR [de-identified] : 10/18/21 CT angio  No PE

## 2022-05-05 NOTE — PHYSICAL EXAM
[Well Developed] : well developed [Well Nourished] : well nourished [No Acute Distress] : no acute distress [Normal Conjunctiva] : normal conjunctiva [Normal Venous Pressure] : normal venous pressure [No Carotid Bruit] : no carotid bruit [Normal Rate] : normal [Normal S1] : normal S1 [Normal S2] : normal S2 [II] : a grade 2 [No Pitting Edema] : no pitting edema present [2+] : left 2+ [No Abnormalities] : the abdominal aorta was not enlarged and no bruit was heard [Clear Lung Fields] : clear lung fields [Good Air Entry] : good air entry [No Respiratory Distress] : no respiratory distress  [Soft] : abdomen soft [Non Tender] : non-tender [No Masses/organomegaly] : no masses/organomegaly [Normal Bowel Sounds] : normal bowel sounds [Normal Gait] : normal gait [No Edema] : no edema [No Cyanosis] : no cyanosis [No Clubbing] : no clubbing [No Varicosities] : no varicosities [No Rash] : no rash [No Skin Lesions] : no skin lesions [Moves all extremities] : moves all extremities [Cognitive Impairment] : cognitive impairment [Murmur] : murmur [S3] : no S3 [S4] : no S4 [Right Carotid Bruit] : no bruit heard over the right carotid [Left Carotid Bruit] : no bruit heard over the left carotid [Right Femoral Bruit] : no bruit heard over the right femoral artery [Left Femoral Bruit] : no bruit heard over the left femoral artery

## 2022-06-02 NOTE — DISCHARGE NOTE NURSING/CASE MANAGEMENT/SOCIAL WORK - PATIENT PORTAL LINK FT
Continue Regimen: Triple cream from [place] \\nSodium sulfur\\n\\nDoxycycline 100 mg bid
You can access the FollowMyHealth Patient Portal offered by [facility] by registering at the following website: [email or web address]. By joining [facility]’s FollowMyHealth portal, you will also be able to view your health information using other applications (apps) compatible with our system.
Detail Level: Zone
Render In Strict Bullet Format?: No
Continue Regimen: Hydroquinone pads 8%

## 2022-06-23 ENCOUNTER — APPOINTMENT (OUTPATIENT)
Dept: RHEUMATOLOGY | Facility: CLINIC | Age: 67
End: 2022-06-23
Payer: MEDICARE

## 2022-06-23 VITALS
HEART RATE: 59 BPM | OXYGEN SATURATION: 96 % | DIASTOLIC BLOOD PRESSURE: 82 MMHG | SYSTOLIC BLOOD PRESSURE: 138 MMHG | RESPIRATION RATE: 16 BRPM | TEMPERATURE: 97.4 F

## 2022-06-23 VITALS
HEART RATE: 55 BPM | RESPIRATION RATE: 16 BRPM | SYSTOLIC BLOOD PRESSURE: 134 MMHG | DIASTOLIC BLOOD PRESSURE: 72 MMHG | OXYGEN SATURATION: 95 %

## 2022-06-23 PROCEDURE — 96374 THER/PROPH/DIAG INJ IV PUSH: CPT | Mod: 59

## 2022-06-23 RX ORDER — ZOLEDRONIC ACID 5 MG/100ML
5 INJECTION INTRAVENOUS
Qty: 0 | Refills: 0 | Status: COMPLETED | OUTPATIENT
Start: 2022-05-31

## 2022-07-06 NOTE — ED ADULT NURSE REASSESSMENT NOTE - RESPIRATORY WDL
Patient weaned and extubated. Placed on 3L N/C. HR 92,SPO2 98. NARD noted at this time. Will continue to monitor. Breathing spontaneous and unlabored. Breath sounds clear and equal bilaterally with regular rhythm.

## 2022-07-08 ENCOUNTER — APPOINTMENT (OUTPATIENT)
Dept: PULMONOLOGY | Facility: CLINIC | Age: 67
End: 2022-07-08

## 2022-07-08 VITALS
SYSTOLIC BLOOD PRESSURE: 134 MMHG | HEIGHT: 68 IN | DIASTOLIC BLOOD PRESSURE: 85 MMHG | OXYGEN SATURATION: 96 % | WEIGHT: 210 LBS | HEART RATE: 68 BPM | BODY MASS INDEX: 31.83 KG/M2

## 2022-07-08 PROCEDURE — 99213 OFFICE O/P EST LOW 20 MIN: CPT

## 2022-10-28 ENCOUNTER — APPOINTMENT (OUTPATIENT)
Dept: ORTHOPEDIC SURGERY | Facility: CLINIC | Age: 67
End: 2022-10-28

## 2022-10-28 VITALS — HEIGHT: 66 IN | BODY MASS INDEX: 33.75 KG/M2 | WEIGHT: 210 LBS

## 2022-10-28 DIAGNOSIS — C80.1 MALIGNANT (PRIMARY) NEOPLASM, UNSPECIFIED: ICD-10-CM

## 2022-10-28 DIAGNOSIS — M19.079 PRIMARY OSTEOARTHRITIS, UNSPECIFIED ANKLE AND FOOT: ICD-10-CM

## 2022-10-28 DIAGNOSIS — Z86.39 PERSONAL HISTORY OF OTHER ENDOCRINE, NUTRITIONAL AND METABOLIC DISEASE: ICD-10-CM

## 2022-10-28 DIAGNOSIS — Z87.898 PERSONAL HISTORY OF OTHER SPECIFIED CONDITIONS: ICD-10-CM

## 2022-10-28 DIAGNOSIS — S82.852D DISPLACED TRIMALLEOLAR FRACTURE OF LEFT LOWER LEG, SUBSEQUENT ENCOUNTER FOR CLOSED FRACTURE WITH ROUTINE HEALING: ICD-10-CM

## 2022-10-28 DIAGNOSIS — Z86.79 PERSONAL HISTORY OF OTHER DISEASES OF THE CIRCULATORY SYSTEM: ICD-10-CM

## 2022-10-28 DIAGNOSIS — M19.172 POST-TRAUMATIC OSTEOARTHRITIS, LEFT ANKLE AND FOOT: ICD-10-CM

## 2022-10-28 PROCEDURE — 99213 OFFICE O/P EST LOW 20 MIN: CPT

## 2022-10-28 PROCEDURE — 73610 X-RAY EXAM OF ANKLE: CPT | Mod: LT

## 2022-10-28 NOTE — HISTORY OF PRESENT ILLNESS
[0] : 0 [Disabled] : Work status: disabled [de-identified] : 10/28/22:  Patient returns today 3 years s/p ORIF lt ankle fx. Seems to be doing well. No c/o pain.\par \par 9/24/21 Initial visit for this 65 yo male ,hx of autism, lives in a group home, hx of osteopenia, s/p ORIF lt ankle fx in 2019. Ambulates independently, although he walks slowly and cautiously. [] : no [FreeTextEntry1] : left ankle [FreeTextEntry9] : none [de-identified] : none [de-identified] : 9/24/22 [de-identified] : dr velazquez [de-identified] : 2019 [de-identified] : home PT

## 2022-10-28 NOTE — PHYSICAL EXAM
[Well Developed] : well developed [Well Nourished] : well nourished [5___] : eversion 5[unfilled]/5 [Left] : left ankle [No loss of surgical correlation. Bony alignment acceptable. Hardware in appropriate position] : No loss of surgical correlation. Bony alignment acceptable. Hardware in appropriate position [Tibio-talar degenerative changes] : Tibio-talar degenerative changes [de-identified] : Autism [] : non-antalgic [FreeTextEntry3] : Has healed medial and lateral incisions  [FreeTextEntry9] : s/p ORIF. Hardware intact. 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

## 2022-11-03 ENCOUNTER — APPOINTMENT (OUTPATIENT)
Dept: CARDIOLOGY | Facility: CLINIC | Age: 67
End: 2022-11-03

## 2022-11-03 ENCOUNTER — NON-APPOINTMENT (OUTPATIENT)
Age: 67
End: 2022-11-03

## 2022-11-03 VITALS
DIASTOLIC BLOOD PRESSURE: 66 MMHG | BODY MASS INDEX: 32.62 KG/M2 | SYSTOLIC BLOOD PRESSURE: 110 MMHG | WEIGHT: 203 LBS | OXYGEN SATURATION: 96 % | HEART RATE: 66 BPM | HEIGHT: 66 IN

## 2022-11-03 DIAGNOSIS — R09.02 HYPOXEMIA: ICD-10-CM

## 2022-11-03 PROCEDURE — 93000 ELECTROCARDIOGRAM COMPLETE: CPT

## 2022-11-03 PROCEDURE — 99214 OFFICE O/P EST MOD 30 MIN: CPT

## 2022-11-03 RX ORDER — MULTIVIT-MIN/IRON FUM/FOLIC AC 7.5 MG-4
TABLET ORAL DAILY
Refills: 0 | Status: DISCONTINUED | COMMUNITY
End: 2022-11-03

## 2022-11-03 RX ORDER — CLINDAMYCIN PHOSPHATE 10 MG/ML
1 LOTION TOPICAL
Qty: 60 | Refills: 0 | Status: DISCONTINUED | COMMUNITY
Start: 2022-04-06 | End: 2022-11-03

## 2022-11-03 RX ORDER — RISPERIDONE 0.5 MG/1
0.5 TABLET, FILM COATED ORAL
Qty: 30 | Refills: 0 | Status: DISCONTINUED | COMMUNITY
Start: 2021-12-22 | End: 2022-11-03

## 2022-11-03 RX ORDER — CHLORHEXIDINE GLUCONATE, 0.12% ORAL RINSE 1.2 MG/ML
0.12 SOLUTION DENTAL
Qty: 473 | Refills: 0 | Status: DISCONTINUED | COMMUNITY
Start: 2021-11-02 | End: 2022-11-03

## 2022-11-03 RX ORDER — FLUOCINONIDE 0.5 MG/G
0.05 CREAM TOPICAL
Qty: 60 | Refills: 0 | Status: DISCONTINUED | COMMUNITY
Start: 2021-12-07 | End: 2022-11-03

## 2022-11-03 RX ORDER — ZOLEDRONIC ACID 5 MG/100ML
5 INJECTION INTRAVENOUS
Qty: 1 | Refills: 0 | Status: DISCONTINUED | OUTPATIENT
Start: 2022-05-03 | End: 2022-11-03

## 2022-11-03 RX ORDER — METOPROLOL SUCCINATE 50 MG/1
50 TABLET, EXTENDED RELEASE ORAL DAILY
Qty: 90 | Refills: 1 | Status: DISCONTINUED | COMMUNITY
Start: 2020-12-18 | End: 2022-11-03

## 2022-11-03 NOTE — DISCUSSION/SUMMARY
[FreeTextEntry1] : \par chest pain ? poor historian , appears to be GI origin ,prior hx of GERD with hiatal hernia  no recurrence  , normal ekg doubt cardiac   no evidence of ischemia on stress test , continue PPI \par \par Hypertension: hypertensive heart disease  Controlled, encourage patient to follow low-salt diet continue medication.\par \par Hyperlipidemia: Controlled. Continue pravastatin, Niaspan. \par \par History of chronic left lower extremity DVT off of anticoagulation, patient did have a GI bleed while on anticoagulation\par \par Cardiac murmur: Possibly due to aortic sclerosis.\par \par Obesity : recommend nutritional evaluation , calorie intake reduction , to decrease weight \par \par \par follow up after  6 months

## 2022-11-03 NOTE — PHYSICAL EXAM
[Well Developed] : well developed [Well Nourished] : well nourished [No Acute Distress] : no acute distress [Normal Conjunctiva] : normal conjunctiva [Normal Venous Pressure] : normal venous pressure [No Carotid Bruit] : no carotid bruit [Murmur] : murmur [Normal Rate] : normal [Normal S1] : normal S1 [Normal S2] : normal S2 [II] : a grade 2 [No Pitting Edema] : no pitting edema present [2+] : left 2+ [No Abnormalities] : the abdominal aorta was not enlarged and no bruit was heard [Clear Lung Fields] : clear lung fields [Good Air Entry] : good air entry [No Respiratory Distress] : no respiratory distress  [Soft] : abdomen soft [Non Tender] : non-tender [Normal Bowel Sounds] : normal bowel sounds [Normal Gait] : normal gait [No Edema] : no edema [No Cyanosis] : no cyanosis [No Clubbing] : no clubbing [No Varicosities] : no varicosities [No Rash] : no rash [No Skin Lesions] : no skin lesions [Moves all extremities] : moves all extremities [Cognitive Impairment] : cognitive impairment [S3] : no S3 [S4] : no S4 [Right Carotid Bruit] : no bruit heard over the right carotid [Left Carotid Bruit] : no bruit heard over the left carotid [Right Femoral Bruit] : no bruit heard over the right femoral artery [Left Femoral Bruit] : no bruit heard over the left femoral artery

## 2022-11-03 NOTE — CARDIOLOGY SUMMARY
[de-identified] : 11/3/22  normal sinus rhythm  [de-identified] : 10/28/21 no evidence of ischemia on chemical nuclear stress test ( significant motion  due to patient factors )  [de-identified] : feb 4 2021 Mild LVH  Normal EF 67% Mild DD , Mild TR [de-identified] : 10/18/21 CT angio  No PE

## 2022-11-03 NOTE — HISTORY OF PRESENT ILLNESS
[FreeTextEntry1] : Patient  with severe autism,hypertension,hyperlipidemia non verbal , prior hx of  DVT to left lower extremity, was on Xarelto s/p GI bleed, s/p endoscopy found to have gastritis, hiatal hernia GERD  brought in for follow up  by caregiver , patient had no new problem \par \par \par Patient did have holter monitor , for few hours showed no significant tachy or larry arrhythmia \par \par Patient blood pressure , heart rate is controlled on home monitoring  , \par \par Patient had echo showed Mild LVH normal LVEF trace AI , aortic sclerosis  \par \par  patient was evaluated with pulmonary , patient was noted to have nocturnal desaturation was recommended to use nocturnal oxygen supplements . patient did have nuclear stress test showed no significant ischemia , though patient was moving while on scanning table\par \par \par Recent   blood  works 3/28/22   normal lipid profile on medication  HDL 52 LDL 83,   TG 88 Hb a1c 5.5

## 2022-11-28 ENCOUNTER — APPOINTMENT (OUTPATIENT)
Dept: RHEUMATOLOGY | Facility: CLINIC | Age: 67
End: 2022-11-28

## 2022-11-28 VITALS
OXYGEN SATURATION: 98 % | BODY MASS INDEX: 33.43 KG/M2 | TEMPERATURE: 96.4 F | WEIGHT: 208 LBS | DIASTOLIC BLOOD PRESSURE: 74 MMHG | SYSTOLIC BLOOD PRESSURE: 134 MMHG | HEART RATE: 90 BPM | HEIGHT: 66 IN | RESPIRATION RATE: 16 BRPM

## 2022-11-28 PROCEDURE — 99213 OFFICE O/P EST LOW 20 MIN: CPT

## 2022-11-28 NOTE — HISTORY OF PRESENT ILLNESS
[FreeTextEntry1] : Patient brought in today for follow-up for osteoporosis management\par Received first dose of IV zoledronic acid June 2022, no issues

## 2023-01-26 NOTE — SWALLOW BEDSIDE ASSESSMENT ADULT - COMMENTS
Please see below.  Thank you.  
Pt mother called to schedule the pt's ablation. She says she was told to call if she haven't heard from the office within 2 days   
Spoke to mom and scheduled Jak's ablation. Thank you!  
The patient is a 63 year old male, A&Ox0-1, admitted from group home and currently NPO. The patient is non-verbal, however his private aide expressed that the patient was tolerating a finely chopped diet texture at his group home prior to admission to the hospital. The patient presents with adequate dentition, lingual and labial ROM and strength are WFL however shoveling behaviors are noted when self feeding. The patient trialed puree, mechanical soft, and regular solids with thin liquids via cup sip and straw. Adequate oral prep and A-P transport, timely swallow trigger with hyolaryngeal excursion. +Throat clearing post swallow with regular solids. No overt s/s penetration/aspiration with mechanical soft textures with thin liquids.

## 2023-02-16 NOTE — H&P ADULT - PROBLEM SELECTOR PROBLEM 8
February 16, 2023      ARACELIS DIAZ  [address]   [place] [address]      Dear Aracelis:    At [facility], we re dedicated to improving your health and wellness. Enclosed is the Care Plan developed with you on 2/10/23. Please review the Care Plan carefully.    As a reminder, during your visit we talked about:    Ways to manage your physical and mental health    Using health care to maintain and improve your health     Your preventive care needs     Remember to contact your care coordinator if you:    Are hospitalized, or plan to be hospitalized     Have a fall      Have a change in your physical or mental health    Need help finding support or services    If you have questions, or don t agree with your Care Plan, call me at [phone]. You can also call me if your needs change. TTY users, call the [place] [employer] at (480) or [phone] (cwlkxd-lq-jfpehz relay service).    Sincerely,    Komal Hector RN, MA    E-mail: [email or web address]  Phone: [phone]      [facility] ([facility]) is a health plan that contracts with both Medicare and the [place] Medical Assistance (Medicaid) program to provide benefits of both programs to enrollees. Enrollment in [facility] depends on contract renewal.    [id number] accepted    [id number] (07/2022)                         Lactate blood increase

## 2023-03-27 NOTE — PATIENT PROFILE ADULT. - NSTOBACCONEVERSMOKERY/N_GEN_A
ED Attending Physician Note      Patient : Dylan Gipson Age: 1 year old Sex: male   MRN: [id number] Encounter Date: 3/27/2023      History     Chief Complaint   Patient presents with   • Fever 9 Weeks to 74 years     Patient is a 1 yr old  UNIMMUNIZED male who presents to ER for evaluation of day 6 of fever with tmax 106.5.  Patient has rhinorrhea and mild cough. No vomiting, diarrhea, rashes.   No sick contacts at home          No Known Allergies    There are no discharge medications for this patient.      There are no discharge medications for this patient.      No past medical history on file.    No past surgical history on file.    No family history on file.         Review of Systems   Constitutional: Positive for fever.   HENT: Positive for congestion and rhinorrhea.    Eyes: Negative.    Respiratory: Negative.    Cardiovascular: Negative.    Gastrointestinal: Negative.    Endocrine: Negative.    Genitourinary: Negative.    Musculoskeletal: Negative.    Skin: Negative.    Allergic/Immunologic: Negative.    Neurological: Negative.    Hematological: Negative.    Psychiatric/Behavioral: Negative.        Physical Exam     ED Triage Vitals   ED Triage Vitals Group      Temp 03/27/23 0248 98.2 °F (36.8 °C)      Heart Rate 03/27/23 0248 115      Resp 03/27/23 0437 28      BP 03/27/23 0248 (!) 113/46      SpO2 03/27/23 0248 99 %      EtCO2 mmHg --       Height --       Weight 03/27/23 0248 41 lb 3.6 oz (18.7 kg)      Weight Scale Used 03/27/23 0248 Standing scale      BMI (Calculated) --       IBW/kg (Calculated) --        Physical Exam  Vitals reviewed.   Constitutional:       General: He is active.      Appearance: Normal appearance. He is well-developed and normal weight.   HENT:      Head: Normocephalic.      Right Ear: Tympanic membrane normal.      Nose: Nose normal.      Mouth/Throat:      Mouth: Mucous membranes are moist.      Pharynx: Oropharynx is clear.      Neck: Normal range of motion and neck supple. 
  Eyes:      Extraocular Movements: Extraocular movements intact.      Conjunctiva/sclera: Conjunctivae normal.      Pupils: Pupils are equal, round, and reactive to light.   Cardiovascular:      Rate and Rhythm: Normal rate and regular rhythm.      Pulses: Normal pulses.      Heart sounds: Normal heart sounds.   Pulmonary:      Effort: Pulmonary effort is normal.      Breath sounds: Normal breath sounds.   Abdominal:      General: Abdomen is flat. Bowel sounds are normal.      Palpations: Abdomen is soft.   Genitourinary:     Rectum: Normal.   Musculoskeletal:         General: Normal range of motion.   Skin:     General: Skin is warm.      Capillary Refill: Capillary refill takes less than 2 seconds.   Neurological:      Mental Status: He is alert and oriented for age.         ED Course     Procedures    Lab Results     Results for orders placed or performed during the hospital encounter of 03/27/23   COVID/Flu/RSV panel   Result Value Ref Range    Rapid SARS-COV-2 by PCR Not Detected Not Detected / Detected / Presumptive Positive / Inhibitors present    Influenza A by PCR Not Detected Not Detected    Influenza B by PCR Not Detected Not Detected    RSV BY PCR Not Detected Not Detected    Isolation Guidelines      Procedural Comment     Comprehensive Metabolic Panel   Result Value Ref Range    Fasting Status      Sodium 134 (L) 135 - 145 mmol/L    Potassium 4.0 3.4 - 5.1 mmol/L    Chloride 103 97 - 110 mmol/L    Carbon Dioxide 23 21 - 32 mmol/L    Anion Gap 12 7 - 19 mmol/L    Glucose 98 70 - 99 mg/dL    BUN 9 5 - 18 mg/dL    Creatinine 0.42 0.21 - 0.70 mg/dL    Glomerular Filtration Rate      BUN/Cr 21 7 - 25    Calcium 9.0 8.0 - 11.0 mg/dL    Bilirubin, Total 0.4 0.2 - 1.4 mg/dL    GOT/AST 56 (H) 10 - 55 Units/L    GPT/ (H) 6 - 45 Units/L    Alkaline Phosphatase 354 (H) 125 - 272 Units/L    Albumin 3.0 (L) 3.5 - 4.8 g/dL    Protein, Total 7.6 6.0 - 8.0 g/dL    Globulin 4.6 (H) 2.0 - 4.0 g/dL    A/G Ratio 
0.7 (L) 1.0 - 2.4   Procalcitonin   Result Value Ref Range    Procalcitonin 2.52 (H) <=0.09 ng/mL   Sedimentation Rate   Result Value Ref Range    RBC Sedimentation Rate 88 (H) 0 - 20 mm/hr   C Reactive Protein   Result Value Ref Range    C-Reactive Protein 5.9 (H) <=1.0 mg/dL   Urinalysis With Microscopy Exam W/O C/S   Result Value Ref Range    COLOR, URINALYSIS Straw     APPEARANCE, URINALYSIS Clear     GLUCOSE, URINALYSIS Negative Negative mg/dL    BILIRUBIN, URINALYSIS Negative Negative    KETONES, URINALYSIS Negative Negative mg/dL    SPECIFIC GRAVITY, URINALYSIS 1.011 1.005 - 1.030    OCCULT BLOOD, URINALYSIS Negative Negative    PH, URINALYSIS 5.5 5.0 - 7.0    PROTEIN, URINALYSIS Negative Negative mg/dL    UROBILINOGEN, URINALYSIS 0.2 0.2, 1.0 mg/dL    NITRITE, URINALYSIS Negative Negative    LEUKOCYTE ESTERASE, URINALYSIS Negative Negative    SQUAMOUS EPITHELIAL, URINALYSIS None Seen None Seen, 1 to 5 /hpf    ERYTHROCYTES, URINALYSIS 1 to 2 None Seen, 1 to 2 /hpf    LEUKOCYTES, URINALYSIS 1 to 5 None Seen, 1 to 5 /hpf    BACTERIA, URINALYSIS None Seen None Seen /hpf    HYALINE CASTS, URINALYSIS None Seen None Seen, 1 to 5 /lpf    MUCUS Present    CBC with Automated Differential (performable only)   Result Value Ref Range    WBC 14.5 6.0 - 17.0 K/mcL    RBC 4.51 3.90 - 5.30 mil/mcL    HGB 11.7 11.5 - 13.5 g/dL    HCT 36.9 34.0 - 40.0 %    MCV 81.8 70.0 - 86.0 fl    MCH 25.9 24.0 - 30.0 pg    MCHC 31.7 30.0 - 36.0 g/dL    RDW-CV 15.0 11.0 - 15.0 %    RDW-SD 44.9 35.0 - 47.0 fL     140 - 450 K/mcL    NRBC 0 <=0 /100 WBC   Respiratory Pathogen Panel   Result Value Ref Range    Adenovirus Not Detected Not Detected    Bocavirus Not Detected Not Detected    Coronavirus, 229E Not Detected Not Detected    Coronavirus, HKU1 Not Detected Not Detected    Chlamydophila pneumoniae Not Detected Not Detected    Coronavirus, NL63 Not Detected Not Detected    Coronavirus, OC43 Not Detected Not Detected    Influenza A, 
Subtype H1 Not Detected Not Detected    Influenza A, 2009 H1N1 Subtype Not Detected Not Detected    Influenza A, Subtype H3 Not Detected Not Detected    Influenza A, Not Subtyped Not Applicable Not Detected, Not Applicable    Influenza B Not Detected Not Detected    Legionella Pneumophila Not Detected Not Detected    Metapneumovirus Not Detected Not Detected    Mycoplasma Pneumoniae Not Detected Not Detected    Parainfluenza, Type 1 Not Detected Not Detected    Parainfluenza, Type 2 Not Detected Not Detected    Parainfluenza, Type 3 Not Detected Not Detected    Parainfluenza, Type 4 Not Detected Not Detected    Rhinovirus / Enterovirus Not Detected Not Detected    Respiratory Syncytial Virus (RSV), Subtype A Not Detected Not Detected    Respiratory Syncytial Virus (RSV), Subtype B Not Detected Not Detected    Procedural Notes       This result was obtained by Multiplex Reverse Transcriptase PCR amplification followed by Liquid Bead Array Hybridization.      This test can detect the following viral and microbial pathogens: Adenovirus, Bocavirus, Coronaviruses (229E, OC43, NL63, and HKU1), Enterovirus, Influenza A, Influenza A H1, Influenza A H3, Influenza A 2009 H1N1, Influenza B, Human Metapneumovirus (hMPV), Mycoplasma pneumoniae, Chlamydia pneumoniae, Parainfluenza Viruses Types 1 - 4, Respiratory Syncytial Viruses A (RSV A),  Respiratory Syncytial Virus B (RSV B), Rhinovirus and Legionella pneumophila.    This test cannot differentiate between Rhinovirus and Enterovirus due to their close genomic homology. Legionella pneumophila was validated and its performance characteristics were determined by [facility].     This test was developed, and its performance characteristics were determined by the FDA. This test is used for clinical purposes. This laboratory is certified under the Clinical Laboratory Improvement Amendments (CLIA) as qualified to perform high complexity clinical laboratory testing.     Manual 
Differential   Result Value Ref Range    Neutrophil, Percent 46 %    Lymphocytes, Percent 39 %    Mono, Percent 9 %    Eosinophils, Percent 0 %    Basophils, Percent 0 %    Bands, Percent 2 0 - 10 %    Reactive Lymphocytes, Percent 4 0 - 5 %    Absolute Neutrophil 7.0 1.5 - 8.5 K/mcL    Absolute Lymphocytes 6.2 3.0 - 9.5 K/mcL    Absolute Monocytes 1.3 (H) 0.0 - 0.8 K/mcL    Absolute Eosinophils 0.0 0.0 - 0.7 K/mcL    Absolute Basophils 0.0 0.0 - 0.2 K/mcL    WBC Morphology Normal Normal    Platelet Morphology Normal Normal    Polychromasia Few    Streptococcus group A PCR    Specimen: Throat; Swab   Result Value Ref Range    STREPTOCOCCUS GROUP A PCR Detected (A) Not Detected   Blood Culture    Specimen: Blood   Result Value Ref Range    Culture, Blood or Bone Marrow No Growth 5 Days.    Blood Culture    Specimen: Blood, Peripheral   Result Value Ref Range    Culture, Blood or Bone Marrow No Growth 5 Days.    Urine, Bacterial Culture    Specimen: Urine clean catch   Result Value Ref Range    Urine, Bacterial Culture No Growth.        EKG Results       Radiology Results     Imaging Results          XR CHEST PA AND LATERAL 2 VIEWS (Final result)  Result time 03/27/23 07:03:02    Final result                 Impression:    Findings of viral pneumonitis versus other atypical pneumonia.     Electronically Signed by: PONCHO PIZANO M.D.   Signed on: 3/27/2023 7:03 AM   Workstation ID: [id number]             Narrative:    EXAMINATION: XR CHEST PA AND LATERAL 2 VIEWS      EXAM DATE: 3/27/2023 6:42 AM    CLINICAL HISTORY: 1 years Male  fever and cough r/o pneumonia     COMPARISON: None     FINDINGS:  Normal cardiomediastinal silhouette.    Hazy bilateral opacities are patent. Peribronchial opacities predominate  towards the lower lungs. There is trace bilateral pleural fluid. Lungs are  not hyperinflated.    Upper abdomen is unremarkable.    Skeletal structures appear normal.  There is no displaced fracture.            
                    ED Medication Orders (From admission, onward)    Ordered Start     Status Ordering Provider    03/27/23 0838 03/27/23 0845  ibuprofen (CHILDRENS ADVIL) 100 MG/5ML suspension 188 mg  ONCE         Last MAR action: Given CINDY RIVAS          ED Course as of 04/04/23 0825   Mon Mar 27, 2023   0501 QUAD neg and strept positive [AO]   0736 STREPTOCOCCUS GROUP A PCR(!): Detected [AM]   0736 XR CHEST PA AND LATERAL 2 VIEWS  IMPRESSION:  Findings of viral pneumonitis versus other atypical pneumonia.  [AM]   0853 Spoke with Dr. Tomas (@[phone]), who is on call for Dr. Carrasco. I reviewed the labs with them. The PMD is concerned about the patient's unvaccinated status and continued fever (now 6 days). Patient does have strep+ and has been on omnicef since Saturday (has received 2 doses thus far). The patient has not had s/s Kawasaki. We discussed potential dispo for this patient. Dr. Tomas is amenable to discharge home with close follow-up in the office, recommending tomorrow. In agreement for rocephin prior to discharge home. [AM]      ED Course User Index  [AM] Cindy Rivas MD  [AO] Renay Nye MD       Medical Decision Making  Patient is a 1 y rold who presents to ER with fever and mild rhinorrhea.   Patient has no stigmata of Kawasaki disease, however will do workup for prolonged fever.  Patient endorsed to [facility] day team at 0600        Clinical Impression     ED Diagnosis   1. Fever, unspecified fever cause            Disposition        Discharge after Treatment 3/27/2023 10:15 AM  There is no comment      Renay Nye MD   4/4/2023 5:04 AM                      Renay Nye MD  04/04/23 0874    
2 seconds or less
No

## 2023-04-10 ENCOUNTER — APPOINTMENT (OUTPATIENT)
Dept: GASTROENTEROLOGY | Facility: CLINIC | Age: 68
End: 2023-04-10
Payer: MEDICARE

## 2023-04-10 VITALS
OXYGEN SATURATION: 96 % | DIASTOLIC BLOOD PRESSURE: 86 MMHG | HEIGHT: 67.5 IN | BODY MASS INDEX: 29.78 KG/M2 | WEIGHT: 192 LBS | HEART RATE: 50 BPM | SYSTOLIC BLOOD PRESSURE: 136 MMHG

## 2023-04-10 DIAGNOSIS — I82.409 ACUTE EMBOLISM AND THROMBOSIS OF UNSPECIFIED DEEP VEINS OF UNSPECIFIED LOWER EXTREMITY: ICD-10-CM

## 2023-04-10 DIAGNOSIS — R13.19 OTHER DYSPHAGIA: ICD-10-CM

## 2023-04-10 DIAGNOSIS — R07.89 OTHER CHEST PAIN: ICD-10-CM

## 2023-04-10 PROCEDURE — 99213 OFFICE O/P EST LOW 20 MIN: CPT

## 2023-04-10 NOTE — PATIENT PROFILE ADULT - NSPROPOAPRESSUREINJURY_GEN_A_NUR
Patient attended Phase 2 Cardiac Rehab Exercise Session. Further documentation will be completed in [facility] and will be scanned into the medical record upon discharge.  
Patient attended dining out class today  
no

## 2023-05-02 ENCOUNTER — APPOINTMENT (OUTPATIENT)
Dept: RHEUMATOLOGY | Facility: CLINIC | Age: 68
End: 2023-05-02
Payer: MEDICARE

## 2023-05-02 VITALS
WEIGHT: 190 LBS | SYSTOLIC BLOOD PRESSURE: 129 MMHG | RESPIRATION RATE: 16 BRPM | HEART RATE: 56 BPM | TEMPERATURE: 97.1 F | DIASTOLIC BLOOD PRESSURE: 75 MMHG | HEIGHT: 78 IN | BODY MASS INDEX: 21.98 KG/M2 | OXYGEN SATURATION: 98 %

## 2023-05-02 PROCEDURE — 99214 OFFICE O/P EST MOD 30 MIN: CPT

## 2023-05-02 RX ADMIN — ZOLEDRONIC ACID 0 MG/100ML: 5 INJECTION, SOLUTION INTRAVENOUS at 00:00

## 2023-05-02 NOTE — ASSESSMENT
[FreeTextEntry1] : #Osteoporosis\par -due for repeat DEXA, pending scheduling\par -monitoring blood work ordered today\par -due for next Reclast infusion June 2023\par \par will notify RN with final plan after review of DEXA and blood work \par \par

## 2023-05-02 NOTE — HISTORY OF PRESENT ILLNESS
[FreeTextEntry1] : No issues reported\par DEXA was canceled and is being rescheduled \par no recent blood work done\par received Reclast June 2022

## 2023-05-04 ENCOUNTER — APPOINTMENT (OUTPATIENT)
Dept: CARDIOLOGY | Facility: CLINIC | Age: 68
End: 2023-05-04
Payer: MEDICARE

## 2023-05-04 ENCOUNTER — NON-APPOINTMENT (OUTPATIENT)
Age: 68
End: 2023-05-04

## 2023-05-04 VITALS
DIASTOLIC BLOOD PRESSURE: 60 MMHG | HEIGHT: 67.5 IN | HEART RATE: 63 BPM | WEIGHT: 200 LBS | OXYGEN SATURATION: 96 % | BODY MASS INDEX: 31.02 KG/M2 | SYSTOLIC BLOOD PRESSURE: 96 MMHG

## 2023-05-04 PROCEDURE — 99214 OFFICE O/P EST MOD 30 MIN: CPT

## 2023-05-04 PROCEDURE — 93000 ELECTROCARDIOGRAM COMPLETE: CPT

## 2023-05-04 RX ORDER — NIACIN 1000 MG
1000 TABLET, EXTENDED RELEASE ORAL DAILY
Refills: 0 | Status: ACTIVE | COMMUNITY

## 2023-05-04 RX ORDER — PANTOPRAZOLE 40 MG/1
40 TABLET, DELAYED RELEASE ORAL
Refills: 0 | Status: ACTIVE | COMMUNITY

## 2023-05-04 RX ORDER — DOCUSATE SODIUM 100 MG/1
100 CAPSULE, LIQUID FILLED ORAL
Qty: 60 | Refills: 0 | Status: ACTIVE | COMMUNITY
Start: 2022-10-13

## 2023-05-04 RX ORDER — ZOLEDRONIC ACID 5 MG/100ML
5 INJECTION INTRAVENOUS
Refills: 0 | Status: DISCONTINUED | OUTPATIENT
Start: 2023-05-02 | End: 2023-05-04

## 2023-05-04 RX ORDER — LORATADINE 10 MG/1
10 TABLET ORAL DAILY
Refills: 0 | Status: ACTIVE | COMMUNITY

## 2023-05-04 RX ORDER — LORATADINE 10 MG/1
10 TABLET ORAL
Qty: 90 | Refills: 3 | Status: DISCONTINUED | COMMUNITY
End: 2023-05-04

## 2023-05-04 NOTE — DISCUSSION/SUMMARY
[FreeTextEntry1] : \par  hx of GERD with hiatal hernia  no recurrence  , normal ekg doubt cardiac   no evidence of ischemia on stress test , continue PPI \par \par Hypertension: hypertensive heart disease  Controlled, encourage patient to follow low-salt diet continue medication.\par \par Hyperlipidemia: Controlled. Continue pravastatin, Niaspan. \par \par History of chronic left lower extremity DVT off of anticoagulation, patient did have a GI bleed while on anticoagulation\par \par Cardiac murmur: Possibly due to aortic sclerosis.\par \par Obesity : recommend nutritional evaluation , calorie intake reduction , to decrease weight \par \par \par follow up after  6 months . please fax me the blood work results that was done today  [EKG obtained to assist in diagnosis and management of assessed problem(s)] : EKG obtained to assist in diagnosis and management of assessed problem(s)

## 2023-05-04 NOTE — CARDIOLOGY SUMMARY
[de-identified] : 5/4/23   normal sinus rhythm  [de-identified] : 10/28/21 no evidence of ischemia on chemical nuclear stress test ( significant motion  due to patient factors )  [de-identified] : feb 4 2021 Mild LVH  Normal EF 67% Mild DD , Mild TR [de-identified] : 10/18/21 CT angio  No PE

## 2023-05-04 NOTE — HISTORY OF PRESENT ILLNESS
[FreeTextEntry1] : Patient  with severe autism,hypertension,hyperlipidemia non verbal , prior hx of  DVT to left lower extremity, was on Xarelto s/p GI bleed, s/p endoscopy found to have gastritis, hiatal hernia GERD  brought in for follow up  by caregiver , patient had no new problem \par \par Patient did have holter monitor , for few hours showed no significant tachy or larry arrhythmia \par \par Patient blood pressure , heart rate is controlled on home monitoring  , \par \par Patient had echo showed Mild LVH normal LVEF trace AI , aortic sclerosis  \par \par  patient was evaluated with pulmonary , patient was noted to have nocturnal desaturation was recommended to use nocturnal oxygen supplements . patient did have nuclear stress test showed no significant ischemia ,though patient was moving while on scanning table\par   \par blood  works 3/28/22   normal lipid profile on medication  HDL 52 LDL 83,   TG 88 Hb a1c 5.5  no recent blood work report

## 2023-05-04 NOTE — PHYSICAL EXAM
[Well Developed] : well developed [Well Nourished] : well nourished [No Acute Distress] : no acute distress [Normal Conjunctiva] : normal conjunctiva [Normal Venous Pressure] : normal venous pressure [No Carotid Bruit] : no carotid bruit [Murmur] : murmur [Normal Rate] : normal [Normal S1] : normal S1 [Normal S2] : normal S2 [II] : a grade 2 [No Pitting Edema] : no pitting edema present [2+] : left 2+ [No Abnormalities] : the abdominal aorta was not enlarged and no bruit was heard [Clear Lung Fields] : clear lung fields [Good Air Entry] : good air entry [No Respiratory Distress] : no respiratory distress  [Soft] : abdomen soft [Non Tender] : non-tender [Normal Bowel Sounds] : normal bowel sounds [Normal Gait] : normal gait [No Edema] : no edema [No Cyanosis] : no cyanosis [No Clubbing] : no clubbing [No Varicosities] : no varicosities [No Rash] : no rash [No Skin Lesions] : no skin lesions [Moves all extremities] : moves all extremities [Cognitive Impairment] : cognitive impairment [S3] : no S3 [S4] : no S4 [Right Carotid Bruit] : no bruit heard over the right carotid [Left Carotid Bruit] : no bruit heard over the left carotid [Right Femoral Bruit] : no bruit heard over the right femoral artery [Left Femoral Bruit] : no bruit heard over the left femoral artery [Normal Radial B/L] : normal radial B/L

## 2023-05-05 ENCOUNTER — NON-APPOINTMENT (OUTPATIENT)
Age: 68
End: 2023-05-05

## 2023-05-08 ENCOUNTER — NON-APPOINTMENT (OUTPATIENT)
Age: 68
End: 2023-05-08

## 2023-05-17 ENCOUNTER — NON-APPOINTMENT (OUTPATIENT)
Age: 68
End: 2023-05-17

## 2023-05-17 RX ADMIN — ZOLEDRONIC ACID 0 MG/100ML: 5 INJECTION, SOLUTION INTRAVENOUS at 00:00

## 2023-06-02 ENCOUNTER — APPOINTMENT (OUTPATIENT)
Dept: PULMONOLOGY | Facility: CLINIC | Age: 68
End: 2023-06-02
Payer: MEDICARE

## 2023-06-02 VITALS
HEART RATE: 72 BPM | DIASTOLIC BLOOD PRESSURE: 63 MMHG | WEIGHT: 200 LBS | OXYGEN SATURATION: 95 % | HEIGHT: 67.5 IN | SYSTOLIC BLOOD PRESSURE: 114 MMHG | BODY MASS INDEX: 31.02 KG/M2

## 2023-06-02 DIAGNOSIS — G47.33 OBSTRUCTIVE SLEEP APNEA (ADULT) (PEDIATRIC): ICD-10-CM

## 2023-06-02 PROCEDURE — 99213 OFFICE O/P EST LOW 20 MIN: CPT

## 2023-06-02 NOTE — ASSESSMENT
[FreeTextEntry1] : Given that he had minimal O2 desaturations at night to only 89%, we will discontinue nocturnal O2 and he will start to use cpap instead as ordered by Dr. Reed.  patient will continue to follow up with  for management of his COLLINS.

## 2023-06-02 NOTE — PROCEDURE
[FreeTextEntry1] : \par Reviewed data :\par  PSG done at [facility] on 11/29/22, diagnosed with mild-mod COLLINS, lowest oxygen sat was 89%, he did not require supplemental O2 during sleep testing, he was then prescribed CPAP by sleep doctor Dr. Neda Reed.  \par \par jan 2022 overnight oximetry reviewed while wearing 2L oxygen, adequate.

## 2023-06-02 NOTE — HISTORY OF PRESENT ILLNESS
[Never] : never [TextBox_4] : Last seen July 2022, he was prescribed nocturnal O2 at the time as his nurses felt he wouldn't be able to complete sleep testing or tolerate cpap if needed.  He then followed up at [facility] sleep center and had PSG done at [facility] on 11/29/22, diagnosed with mild-mod COLLINS, lowest oxygen sat was 89%, he did not require supplemental O2 during sleep testing, he was then prescribed CPAP by sleep doctor Dr. Neda Reed.  \par \par He is here today to see if nocturnal O2 can be discontinued\par no complaints

## 2023-06-26 ENCOUNTER — APPOINTMENT (OUTPATIENT)
Dept: RHEUMATOLOGY | Facility: CLINIC | Age: 68
End: 2023-06-26
Payer: MEDICARE

## 2023-06-26 VITALS
TEMPERATURE: 96.7 F | DIASTOLIC BLOOD PRESSURE: 97 MMHG | SYSTOLIC BLOOD PRESSURE: 157 MMHG | RESPIRATION RATE: 18 BRPM | HEART RATE: 61 BPM | OXYGEN SATURATION: 94 %

## 2023-06-26 VITALS
RESPIRATION RATE: 18 BRPM | DIASTOLIC BLOOD PRESSURE: 74 MMHG | SYSTOLIC BLOOD PRESSURE: 130 MMHG | HEART RATE: 60 BPM | OXYGEN SATURATION: 95 %

## 2023-06-26 PROCEDURE — 96374 THER/PROPH/DIAG INJ IV PUSH: CPT | Mod: 59

## 2023-06-26 RX ORDER — ZOLEDRONIC ACID 5 MG/100ML
5 INJECTION INTRAVENOUS
Qty: 0 | Refills: 0 | Status: COMPLETED | OUTPATIENT
Start: 2023-05-17

## 2023-06-26 NOTE — HISTORY OF PRESENT ILLNESS
[N/A] : N/A [Denies] : Denies [No] : No [Yes] : Yes [Declined] : Declined [Vitamin D] : Vitamin D [Bone Density/DEXA] : Bone Density/DEXA [Right upper extremity] : Right upper extremity [24g] : 24g [Start Time: ___] : Medication Start Time: [unfilled] [End Time: ___] : Medication End Time: [unfilled] [IV discontinued. Intact. No signs or symptoms of IV complications noted. Time: ___] : IV discontinued. Intact. No signs or symptoms of IV complications noted. Time: [unfilled] [Patient  instructed to seek medical attention with signs and symptoms of adverse effects] : Patient  instructed to seek medical attention with signs and symptoms of adverse effects [Patient left unit in no acute distress] : Patient left unit in no acute distress [Medications administered as ordered and tolerated well.] : Medications administered as ordered and tolerated well. [de-identified] : 01:25 pm

## 2023-06-29 NOTE — ED ADULT TRIAGE NOTE - PRO INTERPRETER NEED 2
[FreeTextEntry1] : X-ray images were obtained at the office today.  AP, lateral, oblique views of the left foot weightbearing reveal callus formation over the shaft of the fifth metatarsal.
English

## 2023-07-01 NOTE — ED PROVIDER NOTE - OBJECTIVE STATEMENT
Pt is a 63 yo male with pmhx of PMHx of aggressiveness, agitation, autism, bipolar affective disorder, BPH, GERD, hematuria, HLD, HTN, developmental delay, schizophrenia, paranoid type here for sob from [facility].  Pt is non verbal and is unable to provide history. As per staff pt noted to be have sob abdominal pain and pale skin. Staff unsure about dark stools no nvd fever chills as per staff. Pt is on xarelto due to DVT and iron.     pcp Yovani
no

## 2023-08-09 ENCOUNTER — RESULT REVIEW (OUTPATIENT)
Age: 68
End: 2023-08-09

## 2023-08-09 ENCOUNTER — APPOINTMENT (OUTPATIENT)
Dept: RADIOLOGY | Facility: CLINIC | Age: 68
End: 2023-08-09
Payer: MEDICARE

## 2023-08-09 ENCOUNTER — OUTPATIENT (OUTPATIENT)
Dept: OUTPATIENT SERVICES | Facility: HOSPITAL | Age: 68
LOS: 1 days | End: 2023-08-09
Payer: MEDICARE

## 2023-08-09 DIAGNOSIS — Z00.8 ENCOUNTER FOR OTHER GENERAL EXAMINATION: ICD-10-CM

## 2023-08-09 DIAGNOSIS — S82.892A OTHER FRACTURE OF LEFT LOWER LEG, INITIAL ENCOUNTER FOR CLOSED FRACTURE: Chronic | ICD-10-CM

## 2023-08-09 PROCEDURE — 77085 DXA BONE DENSITY AXL VRT FX: CPT

## 2023-08-09 PROCEDURE — 77085 DXA BONE DENSITY AXL VRT FX: CPT | Mod: 26

## 2023-08-11 ENCOUNTER — OUTPATIENT (OUTPATIENT)
Dept: OUTPATIENT SERVICES | Facility: HOSPITAL | Age: 68
LOS: 1 days | End: 2023-08-11

## 2023-08-11 VITALS
WEIGHT: 195.99 LBS | HEIGHT: 67 IN | RESPIRATION RATE: 18 BRPM | TEMPERATURE: 97 F | OXYGEN SATURATION: 97 % | SYSTOLIC BLOOD PRESSURE: 137 MMHG | DIASTOLIC BLOOD PRESSURE: 85 MMHG | HEART RATE: 68 BPM

## 2023-08-11 DIAGNOSIS — K05.6 PERIODONTAL DISEASE, UNSPECIFIED: ICD-10-CM

## 2023-08-11 DIAGNOSIS — S82.892A OTHER FRACTURE OF LEFT LOWER LEG, INITIAL ENCOUNTER FOR CLOSED FRACTURE: Chronic | ICD-10-CM

## 2023-08-11 DIAGNOSIS — K02.62 DENTAL CARIES ON SMOOTH SURFACE PENETRATING INTO DENTIN: ICD-10-CM

## 2023-08-11 DIAGNOSIS — G47.33 OBSTRUCTIVE SLEEP APNEA (ADULT) (PEDIATRIC): ICD-10-CM

## 2023-08-11 DIAGNOSIS — F20.9 SCHIZOPHRENIA, UNSPECIFIED: ICD-10-CM

## 2023-08-11 DIAGNOSIS — J30.2 OTHER SEASONAL ALLERGIC RHINITIS: ICD-10-CM

## 2023-08-11 LAB
ANION GAP SERPL CALC-SCNC: 12 MMOL/L — SIGNIFICANT CHANGE UP (ref 7–14)
BUN SERPL-MCNC: 13 MG/DL — SIGNIFICANT CHANGE UP (ref 7–23)
CALCIUM SERPL-MCNC: 10.1 MG/DL — SIGNIFICANT CHANGE UP (ref 8.4–10.5)
CHLORIDE SERPL-SCNC: 103 MMOL/L — SIGNIFICANT CHANGE UP (ref 98–107)
CO2 SERPL-SCNC: 25 MMOL/L — SIGNIFICANT CHANGE UP (ref 22–31)
CREAT SERPL-MCNC: 0.66 MG/DL — SIGNIFICANT CHANGE UP (ref 0.5–1.3)
EGFR: 101 ML/MIN/1.73M2 — SIGNIFICANT CHANGE UP
GLUCOSE SERPL-MCNC: 143 MG/DL — HIGH (ref 70–99)
HCT VFR BLD CALC: 38.2 % — LOW (ref 39–50)
HGB BLD-MCNC: 13.1 G/DL — SIGNIFICANT CHANGE UP (ref 13–17)
MCHC RBC-ENTMCNC: 29.9 PG — SIGNIFICANT CHANGE UP (ref 27–34)
MCHC RBC-ENTMCNC: 34.3 GM/DL — SIGNIFICANT CHANGE UP (ref 32–36)
MCV RBC AUTO: 87.2 FL — SIGNIFICANT CHANGE UP (ref 80–100)
NRBC # BLD: 0 /100 WBCS — SIGNIFICANT CHANGE UP (ref 0–0)
NRBC # FLD: 0 K/UL — SIGNIFICANT CHANGE UP (ref 0–0)
PLATELET # BLD AUTO: 222 K/UL — SIGNIFICANT CHANGE UP (ref 150–400)
POTASSIUM SERPL-MCNC: 3.8 MMOL/L — SIGNIFICANT CHANGE UP (ref 3.5–5.3)
POTASSIUM SERPL-SCNC: 3.8 MMOL/L — SIGNIFICANT CHANGE UP (ref 3.5–5.3)
RBC # BLD: 4.38 M/UL — SIGNIFICANT CHANGE UP (ref 4.2–5.8)
RBC # FLD: 12.1 % — SIGNIFICANT CHANGE UP (ref 10.3–14.5)
SODIUM SERPL-SCNC: 140 MMOL/L — SIGNIFICANT CHANGE UP (ref 135–145)
WBC # BLD: 7.82 K/UL — SIGNIFICANT CHANGE UP (ref 3.8–10.5)
WBC # FLD AUTO: 7.82 K/UL — SIGNIFICANT CHANGE UP (ref 3.8–10.5)

## 2023-08-11 RX ORDER — SODIUM CHLORIDE 9 MG/ML
1000 INJECTION, SOLUTION INTRAVENOUS
Refills: 0 | Status: DISCONTINUED | OUTPATIENT
Start: 2023-08-25 | End: 2023-09-08

## 2023-08-11 RX ORDER — MULTIVIT-MIN/FERROUS GLUCONATE 9 MG/15 ML
1 LIQUID (ML) ORAL
Qty: 0 | Refills: 0 | DISCHARGE

## 2023-08-11 RX ORDER — PREGABALIN 225 MG/1
1 CAPSULE ORAL
Qty: 0 | Refills: 0 | DISCHARGE

## 2023-08-11 RX ORDER — DOCUSATE SODIUM 100 MG
1 CAPSULE ORAL
Qty: 0 | Refills: 0 | DISCHARGE

## 2023-08-11 RX ORDER — FERROUS SULFATE 325(65) MG
1 TABLET ORAL
Refills: 0 | DISCHARGE

## 2023-08-11 RX ORDER — FLUVOXAMINE MALEATE 25 MG/1
50 TABLET ORAL
Qty: 0 | Refills: 0 | DISCHARGE

## 2023-08-11 RX ORDER — NIACIN 50 MG
1 TABLET ORAL
Qty: 0 | Refills: 0 | DISCHARGE

## 2023-08-11 RX ORDER — CARBAMIDE PEROXIDE 81.86 MG/ML
5 SOLUTION/ DROPS AURICULAR (OTIC)
Qty: 0 | Refills: 0 | DISCHARGE

## 2023-08-11 RX ORDER — FERROUS SULFATE 325(65) MG
1 TABLET ORAL
Qty: 0 | Refills: 0 | DISCHARGE

## 2023-08-11 RX ORDER — TAMSULOSIN HYDROCHLORIDE 0.4 MG/1
1 CAPSULE ORAL
Qty: 0 | Refills: 0 | DISCHARGE

## 2023-08-11 RX ORDER — ACETAMINOPHEN 500 MG
2 TABLET ORAL
Qty: 0 | Refills: 0 | DISCHARGE

## 2023-08-11 NOTE — H&P PST ADULT - PROBLEM SELECTOR PLAN 3
Patient instructed to take Claritin with a sip of water on the morning of procedure. Instructed for patient to take Claritin with a sip of water on the morning of procedure.

## 2023-08-11 NOTE — H&P PST ADULT - NSICDXPASTMEDICALHX_GEN_ALL_CORE_FT
PAST MEDICAL HISTORY:  Agitation     Arthritis     At risk for self injurious behavior     Autism     Bipolar affective disorder     BPH (benign prostatic hypertrophy) with urinary obstruction     Bradycardia     DVT, lower extremity     Dysphagia     Gastric ulcer     GERD (gastroesophageal reflux disease)     H/O hemorrhoids     H/O hiatal hernia     H/O: GI bleed     History of iron deficiency     Hyperlipidemia     Hypertension     Mental retardation     Mild mitral regurgitation     Nonverbal     COLLINS (obstructive sleep apnea)     Periodontal disease, unspecified     Schizophrenia, paranoid type     Seasonal allergies

## 2023-08-11 NOTE — H&P PST ADULT - NEGATIVE NEUROLOGICAL SYMPTOMS
no weakness/no generalized seizures/no syncope/no tremors/no difficulty walking/no loss of consciousness

## 2023-08-11 NOTE — H&P PST ADULT - CARDIOVASCULAR
details… normal/regular rate and rhythm/S1 S2 present/no gallops/no rub/no murmur normal/regular rate and rhythm/S1 S2 present/no gallops/no rub/no murmur/no pedal edema

## 2023-08-11 NOTE — H&P PST ADULT - PROBLEM SELECTOR PLAN 1
Patient tentatively scheduled for Comprehensive dental treatment on 8/18/23.  Pre-op instructions provided. Given verbal and written instructions to medical counselor from group home. Verbalized understanding with return demonstration.   Patient instructed to take Pantoprazole with a sip of water on the morning of procedure.  CBC, BMP were done at [facility].  Copy of EKG in chart.  Medical evaluation requested (hx of Autism, bipolar). Patient has an appointment Aug 15, 2023.  Brother Alexis Martines will sign for consent- [phone] Patient tentatively scheduled for Comprehensive dental treatment on 8/18/23.  Pre-op instructions provided. Given verbal and written instructions to medical counselor from group home. Verbalized understanding with return demonstration.   Patient instructed to take Pantoprazole with a sip of water on the morning of procedure.  CBC, BMP were done at [facility].  Copy of EKG in chart.  Copy of Sleep study requested.   Medical evaluation requested (hx of Autism, bipolar, HTN). Patient has an appointment Aug 15, 2023.  Brother Alexis Maritnes will sign for consent- [phone] Patient tentatively scheduled for Comprehensive dental treatment on 8/18/23.  Pre-op instructions provided. Given verbal and written instructions to medical counselor from group home. Verbalized understanding.  Patient instructed to take Pantoprazole with a sip of water on the morning of procedure.  CBC, BMP were done at [facility].  Copy of EKG in chart.  Copy of Sleep study requested.   Medical evaluation requested (hx of Autism, bipolar, HTN). Patient has an appointment Aug 15, 2023.  Brother Alexis Martines will sign for consent- [phone] Patient tentatively scheduled for Comprehensive dental treatment on 8/18/23.  Pre-op instructions provided. Given verbal and written instructions to medical counselor from group home. Verbalized understanding.  Instructed for patient to take Pantoprazole with a sip of water on the morning of procedure.  CBC, BMP were done at [facility] [place].  Copy of EKG in chart.  Copy of Sleep study requested.   Medical evaluation requested (hx of Autism, bipolar, HTN). Patient has an appointment Aug 15, 2023.  Brother Alexis Martines will sign for consent- [phone]

## 2023-08-11 NOTE — H&P PST ADULT - HISTORY OF PRESENT ILLNESS
68 year old male with pmhx of HTN, HLD, GERD, LLE was on xarelto, GI bleed, Autism, Bipolar, COLLINS on cpap, Schizophrenia presents for pre-op evaluation for diagnosis of Periodontal disease unspecified. Pt is scheduled for Comprehensive dental treatment. Patient is non-verbal.  68 year old male with pmhx of HTN, HLD, GERD, LLE was on xarelto, GI bleed, Autism, Bipolar disorder, COLLINS on cpap, Schizophrenia presents for pre-op evaluation for diagnosis of Periodontal disease unspecified. Pt is scheduled for Comprehensive dental treatment. Patient is non-verbal.  68 year old male with pmhx of HTN, HLD, GERD, LLE was on xarelto, GI bleed, Autism, Bipolar disorder, COLLINS on cpap, Schizophrenia presents for pre-op evaluation for diagnosis of Periodontal disease unspecified. Pt is scheduled for Comprehensive dental treatment. Patient is non-verbal, poor historian.

## 2023-08-11 NOTE — H&P PST ADULT - PROBLEM SELECTOR PLAN 2
Patient instructed to take Risperidone and Luvox with a sip of water on the morning of procedure. Instructed for Patient to take Risperidone and Luvox with a sip of water on the morning of procedure.

## 2023-08-11 NOTE — H&P PST ADULT - NEGATIVE ENMT SYMPTOMS
Denies dentures. Denies loose teeth./no hearing difficulty/no ear pain/no sinus symptoms/no nasal congestion/no nose bleeds/no dry mouth

## 2023-08-17 NOTE — ED ADULT NURSE NOTE - PAIN RATING/NUMBER SCALE (0-10): ACTIVITY
Called Janessa from the compounding pharmacy. She wanted clarification on the quantity of escitalopram. Told her that Dr. Garza had wanted a 30 day script. She said she will put it down for 30 days but patient has to  refill after 14 days. They only provide 14 days worth of medications.     Janie Dunlap RN on 8/17/2023 at 12:10 PM     0

## 2023-08-22 PROBLEM — R00.1 BRADYCARDIA, UNSPECIFIED: Chronic | Status: ACTIVE | Noted: 2023-08-11

## 2023-08-22 PROBLEM — R13.10 DYSPHAGIA, UNSPECIFIED: Chronic | Status: ACTIVE | Noted: 2023-08-11

## 2023-08-22 PROBLEM — Z91.89 OTHER SPECIFIED PERSONAL RISK FACTORS, NOT ELSEWHERE CLASSIFIED: Chronic | Status: ACTIVE | Noted: 2023-08-11

## 2023-08-22 PROBLEM — Z87.19 PERSONAL HISTORY OF OTHER DISEASES OF THE DIGESTIVE SYSTEM: Chronic | Status: ACTIVE | Noted: 2023-08-11

## 2023-08-22 PROBLEM — I34.0 NONRHEUMATIC MITRAL (VALVE) INSUFFICIENCY: Chronic | Status: ACTIVE | Noted: 2023-08-11

## 2023-08-22 PROBLEM — K05.6 PERIODONTAL DISEASE, UNSPECIFIED: Chronic | Status: ACTIVE | Noted: 2023-08-11

## 2023-08-22 PROBLEM — M19.90 UNSPECIFIED OSTEOARTHRITIS, UNSPECIFIED SITE: Chronic | Status: ACTIVE | Noted: 2023-08-11

## 2023-08-22 PROBLEM — K25.9 GASTRIC ULCER, UNSPECIFIED AS ACUTE OR CHRONIC, WITHOUT HEMORRHAGE OR PERFORATION: Chronic | Status: ACTIVE | Noted: 2023-08-11

## 2023-08-22 PROBLEM — I82.409 ACUTE EMBOLISM AND THROMBOSIS OF UNSPECIFIED DEEP VEINS OF UNSPECIFIED LOWER EXTREMITY: Chronic | Status: ACTIVE | Noted: 2023-08-11

## 2023-08-22 PROBLEM — J30.2 OTHER SEASONAL ALLERGIC RHINITIS: Chronic | Status: ACTIVE | Noted: 2023-08-11

## 2023-08-22 PROBLEM — G47.33 OBSTRUCTIVE SLEEP APNEA (ADULT) (PEDIATRIC): Chronic | Status: ACTIVE | Noted: 2023-08-11

## 2023-08-22 PROBLEM — Z86.39 PERSONAL HISTORY OF OTHER ENDOCRINE, NUTRITIONAL AND METABOLIC DISEASE: Chronic | Status: ACTIVE | Noted: 2023-08-11

## 2023-08-24 ENCOUNTER — TRANSCRIPTION ENCOUNTER (OUTPATIENT)
Age: 68
End: 2023-08-24

## 2023-08-25 ENCOUNTER — TRANSCRIPTION ENCOUNTER (OUTPATIENT)
Age: 68
End: 2023-08-25

## 2023-08-25 ENCOUNTER — OUTPATIENT (OUTPATIENT)
Dept: OUTPATIENT SERVICES | Facility: HOSPITAL | Age: 68
LOS: 1 days | Discharge: HOME | End: 2023-08-25

## 2023-08-25 VITALS
OXYGEN SATURATION: 96 % | TEMPERATURE: 98 F | DIASTOLIC BLOOD PRESSURE: 70 MMHG | RESPIRATION RATE: 18 BRPM | WEIGHT: 195.99 LBS | HEART RATE: 63 BPM | HEIGHT: 67 IN | SYSTOLIC BLOOD PRESSURE: 153 MMHG

## 2023-08-25 VITALS
SYSTOLIC BLOOD PRESSURE: 145 MMHG | RESPIRATION RATE: 17 BRPM | HEART RATE: 91 BPM | DIASTOLIC BLOOD PRESSURE: 74 MMHG | TEMPERATURE: 97 F | OXYGEN SATURATION: 93 %

## 2023-08-25 DIAGNOSIS — S82.892A OTHER FRACTURE OF LEFT LOWER LEG, INITIAL ENCOUNTER FOR CLOSED FRACTURE: Chronic | ICD-10-CM

## 2023-08-25 DIAGNOSIS — K02.62 DENTAL CARIES ON SMOOTH SURFACE PENETRATING INTO DENTIN: ICD-10-CM

## 2023-08-25 RX ORDER — PANTOPRAZOLE SODIUM 20 MG/1
1 TABLET, DELAYED RELEASE ORAL
Refills: 0 | DISCHARGE

## 2023-08-25 RX ORDER — DOCUSATE SODIUM 100 MG
1 CAPSULE ORAL
Refills: 0 | DISCHARGE

## 2023-08-25 RX ORDER — TAMSULOSIN HYDROCHLORIDE 0.4 MG/1
1 CAPSULE ORAL
Refills: 0 | DISCHARGE

## 2023-08-25 RX ORDER — LORATADINE 10 MG/1
1 TABLET ORAL
Refills: 0 | DISCHARGE

## 2023-08-25 RX ORDER — FLUVOXAMINE MALEATE 25 MG/1
1 TABLET ORAL
Refills: 0 | DISCHARGE

## 2023-08-25 RX ORDER — CARBAMIDE PEROXIDE 81.86 MG/ML
5 SOLUTION/ DROPS AURICULAR (OTIC)
Refills: 0 | DISCHARGE

## 2023-08-25 RX ORDER — NIACIN 50 MG
1 TABLET ORAL
Refills: 0 | DISCHARGE

## 2023-08-25 RX ORDER — RISPERIDONE 4 MG/1
1 TABLET ORAL
Refills: 0 | DISCHARGE

## 2023-08-25 RX ORDER — METOPROLOL TARTRATE 50 MG
1 TABLET ORAL
Refills: 0 | DISCHARGE

## 2023-08-25 RX ORDER — MUPIROCIN 20 MG/G
1 OINTMENT TOPICAL
Refills: 0 | DISCHARGE

## 2023-08-25 RX ORDER — FLUTICASONE PROPIONATE 50 MCG
1 SPRAY, SUSPENSION NASAL
Refills: 0 | DISCHARGE

## 2023-08-25 RX ORDER — CHLORHEXIDINE GLUCONATE 213 G/1000ML
15 SOLUTION TOPICAL
Refills: 0 | DISCHARGE

## 2023-08-25 RX ORDER — PREGABALIN 225 MG/1
0 CAPSULE ORAL
Refills: 0 | DISCHARGE

## 2023-08-25 NOTE — ASU DISCHARGE PLAN (ADULT/PEDIATRIC) - NURSING INSTRUCTIONS
DO NOT take any Tylenol (Acetaminophen) or narcotics containing Tylenol until after  _8pm_____ . You received Tylenol during your operation and it can cause damage to your liver if too much is taken within a 24 hour time period.

## 2023-08-25 NOTE — ASU PREOP CHECKLIST - 1.
Patient is autistic, bipolar, schitzophrenic Patient is autistic, bipolar, schizophrenic/ non verbal

## 2023-08-25 NOTE — ASU DISCHARGE PLAN (ADULT/PEDIATRIC) - ASU DC SPECIAL INSTRUCTIONSFT
comprehensive dental treatment under general anesthesia   ******************************************************************************************   exam, xrays, perioodntal treatment and flouride   ******************************************************************************************   Tylenol or motrin for the next few days if needed   ******************************************************************************************   see Dr Fernandez on 9/7 at 10:45  am, appt is set at [facility]

## 2023-11-02 ENCOUNTER — APPOINTMENT (OUTPATIENT)
Dept: CARDIOLOGY | Facility: CLINIC | Age: 68
End: 2023-11-02
Payer: MEDICARE

## 2023-11-02 ENCOUNTER — NON-APPOINTMENT (OUTPATIENT)
Age: 68
End: 2023-11-02

## 2023-11-02 VITALS
WEIGHT: 196 LBS | OXYGEN SATURATION: 96 % | DIASTOLIC BLOOD PRESSURE: 70 MMHG | HEART RATE: 57 BPM | BODY MASS INDEX: 30.4 KG/M2 | SYSTOLIC BLOOD PRESSURE: 108 MMHG | HEIGHT: 67.5 IN

## 2023-11-02 DIAGNOSIS — R94.31 ABNORMAL ELECTROCARDIOGRAM [ECG] [EKG]: ICD-10-CM

## 2023-11-02 PROCEDURE — 93000 ELECTROCARDIOGRAM COMPLETE: CPT

## 2023-11-02 PROCEDURE — 99214 OFFICE O/P EST MOD 30 MIN: CPT

## 2023-11-02 RX ORDER — CARBAMIDE PEROXIDE 6.5 %
6.5 DROPS OTIC (EAR)
Refills: 0 | Status: ACTIVE | COMMUNITY

## 2023-11-02 RX ORDER — TRIAMCINOLONE ACETONIDE 1 MG/G
0.1 CREAM TOPICAL
Refills: 0 | Status: DISCONTINUED | COMMUNITY
End: 2023-11-02

## 2023-11-02 RX ORDER — COLLOIDAL OATMEAL/SOAP
BAR TOPICAL
Refills: 0 | Status: DISCONTINUED | COMMUNITY
End: 2023-11-02

## 2023-11-02 RX ORDER — CHLORHEXIDINE GLUCONATE, 0.12% ORAL RINSE 1.2 MG/ML
0.12 SOLUTION DENTAL
Refills: 0 | Status: DISCONTINUED | COMMUNITY
End: 2023-11-02

## 2023-11-02 RX ORDER — CHLORHEXIDINE GLUCONATE 1.2 MG/ML
0.12 RINSE ORAL
Refills: 0 | Status: ACTIVE | COMMUNITY

## 2023-11-02 RX ORDER — MELATONIN 10 MG
600-20 TABLET, SUBLINGUAL SUBLINGUAL
Qty: 90 | Refills: 0 | Status: DISCONTINUED | COMMUNITY
Start: 2021-03-17 | End: 2023-11-02

## 2023-11-02 RX ORDER — MULTIVITAMIN/IRON/FOLIC ACID 18MG-0.4MG
600-400 TABLET ORAL TWICE DAILY
Refills: 0 | Status: ACTIVE | COMMUNITY

## 2023-11-02 RX ORDER — FLUTICASONE PROPIONATE 50 MCG
50 SPRAY, SUSPENSION NASAL DAILY
Refills: 0 | Status: ACTIVE | COMMUNITY

## 2023-11-02 RX ORDER — FLUVOXAMINE MALEATE 50 MG
50 TABLET ORAL TWICE DAILY
Refills: 0 | Status: ACTIVE | COMMUNITY

## 2023-11-02 RX ORDER — CHLORHEXIDINE GLUCONATE 4 %
325 (65 FE) LIQUID (ML) TOPICAL
Refills: 0 | Status: ACTIVE | COMMUNITY

## 2023-11-02 RX ORDER — RISPERIDONE 0.5 MG/1
0.5 TABLET, FILM COATED ORAL DAILY
Refills: 0 | Status: ACTIVE | COMMUNITY

## 2024-02-09 ENCOUNTER — APPOINTMENT (OUTPATIENT)
Dept: RHEUMATOLOGY | Facility: CLINIC | Age: 69
End: 2024-02-09
Payer: MEDICARE

## 2024-02-09 VITALS
DIASTOLIC BLOOD PRESSURE: 83 MMHG | RESPIRATION RATE: 16 BRPM | TEMPERATURE: 98.1 F | OXYGEN SATURATION: 98 % | HEART RATE: 67 BPM | BODY MASS INDEX: 31.8 KG/M2 | HEIGHT: 67.5 IN | SYSTOLIC BLOOD PRESSURE: 145 MMHG | WEIGHT: 205 LBS

## 2024-02-09 DIAGNOSIS — M81.0 AGE-RELATED OSTEOPOROSIS W/OUT CURRENT PATHOLOGICAL FRACTURE: ICD-10-CM

## 2024-02-09 PROCEDURE — 99214 OFFICE O/P EST MOD 30 MIN: CPT

## 2024-02-09 RX ADMIN — ZOLEDRONIC ACID 0 MG/100ML: 5 INJECTION, SOLUTION INTRAVENOUS at 00:00

## 2024-02-09 NOTE — ASSESSMENT
[FreeTextEntry1] : #Osteoporosis -received 2 doses of Reclast infusion (June 2022 and June 2023) -Recent DEXA August 2023: bone density is improved (in 2021 his score was -2.6 now it's -1.6) Recommend: -needs to repeat labs, ordered for May 2024- referral provided today -after review of labs, will finalize paperwork for Reclast infusion for June 2024 (IBF form and order already entered) Plan for this to be the third and last infusion -he will need repeat DEXA in August of 2025 -continue with Calcium 600 once daily and Vitamin D 800 IU once daily  follow up as needed

## 2024-02-09 NOTE — HISTORY OF PRESENT ILLNESS
[FreeTextEntry1] : Follow up for osteoporosis management no updates or new concerns today  most recent labs from 9/2023 reviewed today as well as the most updated list of medications

## 2024-02-09 NOTE — PHYSICAL EXAM
[General Appearance - Alert] : alert [General Appearance - In No Acute Distress] : in no acute distress [Musculoskeletal - Swelling] : no joint swelling seen

## 2024-03-04 ENCOUNTER — APPOINTMENT (OUTPATIENT)
Dept: GASTROENTEROLOGY | Facility: CLINIC | Age: 69
End: 2024-03-04
Payer: MEDICARE

## 2024-03-04 VITALS
WEIGHT: 192 LBS | HEIGHT: 67.5 IN | BODY MASS INDEX: 29.78 KG/M2 | DIASTOLIC BLOOD PRESSURE: 73 MMHG | SYSTOLIC BLOOD PRESSURE: 116 MMHG | OXYGEN SATURATION: 97 % | HEART RATE: 67 BPM

## 2024-03-04 DIAGNOSIS — R74.8 ABNORMAL LEVELS OF OTHER SERUM ENZYMES: ICD-10-CM

## 2024-03-04 DIAGNOSIS — Z87.19 PERSONAL HISTORY OF OTHER DISEASES OF THE DIGESTIVE SYSTEM: ICD-10-CM

## 2024-03-04 DIAGNOSIS — K21.9 GASTRO-ESOPHAGEAL REFLUX DISEASE W/OUT ESOPHAGITIS: ICD-10-CM

## 2024-03-04 DIAGNOSIS — R10.13 EPIGASTRIC PAIN: ICD-10-CM

## 2024-03-04 DIAGNOSIS — K92.2 GASTROINTESTINAL HEMORRHAGE, UNSPECIFIED: ICD-10-CM

## 2024-03-04 PROCEDURE — 99215 OFFICE O/P EST HI 40 MIN: CPT

## 2024-03-04 NOTE — PHYSICAL EXAM
[Alert] : alert [No Acute Distress] : no acute distress [Well Nourished] : well nourished [Hearing Threshold Finger Rub Not [place]] : hearing was normal [Sclera] : the sclera and conjunctiva were normal [Normal Lips/Gums] : the lips and gums were normal [Oropharynx] : the oropharynx was normal [Normal Appearance] : the appearance of the neck was normal [No Respiratory Distress] : no respiratory distress [Abdomen Soft] : soft [Abdomen Tenderness] : non-tender

## 2024-03-05 PROBLEM — R10.13 EPIGASTRIC PAIN: Status: ACTIVE | Noted: 2021-09-28

## 2024-03-05 PROBLEM — K92.2 GASTROINTESTINAL BLEEDING: Status: ACTIVE | Noted: 2020-03-13

## 2024-03-05 PROBLEM — R74.8 ELEVATED ALKALINE PHOSPHATASE LEVEL: Status: ACTIVE | Noted: 2022-04-25

## 2024-03-05 NOTE — HISTORY OF PRESENT ILLNESS
[FreeTextEntry1] : Pleasant 68-year-old man with a Hx of Osteoporosis, DVT (previously on Xarelto), recurrent GI bleed, anemia, inability to eat, abdominal pain, and dysphagia presents for follow-up. Reports well-formed and regular BMs. Reports no abdominal pain.

## 2024-03-05 NOTE — CONSULT LETTER
[Dear  ___] : Dear  [unfilled], [Courtesy Letter:] : I had the pleasure of seeing your patient, [unfilled], in my office today. [Please see my note below.] : Please see my note below. [Sincerely,] : Sincerely, [FreeTextEntry3] : Dr. Caitlin Adams

## 2024-03-05 NOTE — ASSESSMENT
[FreeTextEntry1] : Pleasant 68-year-old man with a Hx of Osteoporosis, DVT (previously on Xarelto), recurrent GI bleed, anemia, inability to eat, abdominal pain, and dysphagia presents for follow-up. Reports well-formed and regular BMs. Reports no abdominal pain. Recommend that the pt continue his current medications.   All questions answered Call with any questions or concerns Time spent before and after visit reviewing patient's chart

## 2024-05-09 ENCOUNTER — RX RENEWAL (OUTPATIENT)
Age: 69
End: 2024-05-09

## 2024-05-09 RX ORDER — METOPROLOL SUCCINATE 25 MG/1
25 TABLET, EXTENDED RELEASE ORAL DAILY
Qty: 90 | Refills: 3 | Status: ACTIVE | COMMUNITY
Start: 2024-05-09 | End: 1900-01-01

## 2024-05-13 DIAGNOSIS — D72.829 ELEVATED WHITE BLOOD CELL COUNT, UNSPECIFIED: ICD-10-CM

## 2024-05-13 LAB
25(OH)D3 SERPL-MCNC: 31.6 NG/ML
ALBUMIN SERPL ELPH-MCNC: 4.1 G/DL
ALP BLD-CCNC: 107 U/L
ALT SERPL-CCNC: 17 U/L
ANION GAP SERPL CALC-SCNC: 11 MMOL/L
AST SERPL-CCNC: 20 U/L
BASOPHILS # BLD AUTO: 0.07 K/UL
BASOPHILS NFR BLD AUTO: 0.4 %
BILIRUB SERPL-MCNC: 0.2 MG/DL
BUN SERPL-MCNC: 11 MG/DL
CALCIUM SERPL-MCNC: 9.9 MG/DL
CHLORIDE SERPL-SCNC: 102 MMOL/L
CO2 SERPL-SCNC: 27 MMOL/L
CREAT SERPL-MCNC: 0.83 MG/DL
EGFR: 94 ML/MIN/1.73M2
EOSINOPHIL # BLD AUTO: 0.25 K/UL
EOSINOPHIL NFR BLD AUTO: 1.6 %
HCT VFR BLD CALC: 38.1 %
HGB BLD-MCNC: 12.3 G/DL
IMM GRANULOCYTES NFR BLD AUTO: 0.4 %
LYMPHOCYTES # BLD AUTO: 1.13 K/UL
LYMPHOCYTES NFR BLD AUTO: 7.2 %
MAN DIFF?: NORMAL
MCHC RBC-ENTMCNC: 28.1 PG
MCHC RBC-ENTMCNC: 32.3 GM/DL
MCV RBC AUTO: 87 FL
MONOCYTES # BLD AUTO: 1.19 K/UL
MONOCYTES NFR BLD AUTO: 7.5 %
NEUTROPHILS # BLD AUTO: 13.08 K/UL
NEUTROPHILS NFR BLD AUTO: 82.9 %
PLATELET # BLD AUTO: 368 K/UL
POTASSIUM SERPL-SCNC: 4.3 MMOL/L
PROT SERPL-MCNC: 7 G/DL
RBC # BLD: 4.38 M/UL
RBC # FLD: 12.7 %
SODIUM SERPL-SCNC: 140 MMOL/L
WBC # FLD AUTO: 15.79 K/UL

## 2024-05-31 ENCOUNTER — NON-APPOINTMENT (OUTPATIENT)
Age: 69
End: 2024-05-31

## 2024-05-31 ENCOUNTER — APPOINTMENT (OUTPATIENT)
Dept: CARDIOLOGY | Facility: CLINIC | Age: 69
End: 2024-05-31
Payer: MEDICARE

## 2024-05-31 VITALS
SYSTOLIC BLOOD PRESSURE: 128 MMHG | DIASTOLIC BLOOD PRESSURE: 74 MMHG | BODY MASS INDEX: 30.09 KG/M2 | HEIGHT: 67.5 IN | HEART RATE: 70 BPM | OXYGEN SATURATION: 96 % | WEIGHT: 194 LBS

## 2024-05-31 DIAGNOSIS — E78.5 HYPERLIPIDEMIA, UNSPECIFIED: ICD-10-CM

## 2024-05-31 DIAGNOSIS — R01.1 CARDIAC MURMUR, UNSPECIFIED: ICD-10-CM

## 2024-05-31 DIAGNOSIS — I10 ESSENTIAL (PRIMARY) HYPERTENSION: ICD-10-CM

## 2024-05-31 PROCEDURE — G2211 COMPLEX E/M VISIT ADD ON: CPT

## 2024-05-31 PROCEDURE — 93000 ELECTROCARDIOGRAM COMPLETE: CPT

## 2024-05-31 PROCEDURE — 99214 OFFICE O/P EST MOD 30 MIN: CPT

## 2024-05-31 RX ORDER — ZOLEDRONIC ACID 5 MG/100ML
5 INJECTION INTRAVENOUS
Refills: 0 | Status: DISCONTINUED | OUTPATIENT
Start: 2024-02-09 | End: 2024-05-31

## 2024-05-31 NOTE — PHYSICAL EXAM
[Well Developed] : well developed [Well Nourished] : well nourished [No Acute Distress] : no acute distress [Normal Conjunctiva] : normal conjunctiva [Normal Venous Pressure] : normal venous pressure [No Carotid Bruit] : no carotid bruit [Murmur] : murmur [Normal Rate] : normal [Normal S1] : normal S1 [Normal S2] : normal S2 [II] : a grade 2 [No Pitting Edema] : no pitting edema present [2+] : left 2+ [No Abnormalities] : the abdominal aorta was not enlarged and no bruit was heard [Clear Lung Fields] : clear lung fields [Good Air Entry] : good air entry [No Respiratory Distress] : no respiratory distress  [Soft] : abdomen soft [Non Tender] : non-tender [Normal Bowel Sounds] : normal bowel sounds [Normal Gait] : normal gait [No Edema] : no edema [No Cyanosis] : no cyanosis [No Clubbing] : no clubbing [No Varicosities] : no varicosities [Normal Radial B/L] : normal radial B/L [No Rash] : no rash [No Skin Lesions] : no skin lesions [Moves all extremities] : moves all extremities [Cognitive Impairment] : cognitive impairment [S3] : no S3 [S4] : no S4 [Right Carotid Bruit] : no bruit heard over the right carotid [Left Carotid Bruit] : no bruit heard over the left carotid [Right Femoral Bruit] : no bruit heard over the right femoral artery [Left Femoral Bruit] : no bruit heard over the left femoral artery

## 2024-05-31 NOTE — DISCUSSION/SUMMARY
[FreeTextEntry1] :  hx of GERD with hiatal hernia  no recurrence  ,   Sinus bradycardia: Chronic mild bradycardia  pt without c/o cp/sob/palpitations.  no evidence of ischemia on stress test , continue PPI , dcontinue toprol XL ( metoprolol succinate ) 25 mg po daily   Hypertension: hypertensive heart disease  Controlled, encourage patient to follow low-salt diet continue medication.  Hyperlipidemia: Controlled. Continue pravastatin, Niaspan.   History of chronic left lower extremity DVT off of anticoagulation, patient did have a GI bleed while on anticoagulation  Cardiac murmur: Possibly due to aortic sclerosis. echo during next visit   Obesity : recommend nutritional evaluation , calorie intake reduction , to decrease weight   follow up after  6 months .  [EKG obtained to assist in diagnosis and management of assessed problem(s)] : EKG obtained to assist in diagnosis and management of assessed problem(s)

## 2024-05-31 NOTE — HISTORY OF PRESENT ILLNESS
[FreeTextEntry1] : Patient with severe autism,hypertension,hyperlipidemia non verbal , prior hx of  DVT to left lower extremity, was on Xarelto s/p GI bleed, s/p endoscopy found to have gastritis, hiatal hernia GERD  brought in for follow up  by caregiver. who says he is doing well , he is physically active without obvious shortness of breath   Patient did have holter monitor , for few hours showed no significant tachy or larry arrhythmia   Patient blood pressure , heart rate is controlled on home monitoring  ,  HR improved after decreasing BB dose   Patient had echo showed Mild LVH normal LVEF trace AI , aortic sclerosis     patient was evaluated by pulmonary, patient was noted to have nocturnal desaturation was recommended to use nocturnal oxygen supplements. patient did have nuclear stress test showed no significant ischemia, though patient was moving while on scanning table    blood works 9/2023 Tchol 137, HDL 54, LDL68, Trig 72.

## 2024-05-31 NOTE — CARDIOLOGY SUMMARY
[de-identified] : 5/31/24 normal sinus rhythm  [de-identified] : 10/28/21 no evidence of ischemia on chemical nuclear stress test ( significant motion  due to patient factors )  [de-identified] : feb 4 2021 Mild LVH  Normal EF 67% Mild DD , Mild TR [de-identified] : 10/18/21 CT angio  No PE

## 2024-06-07 DIAGNOSIS — Z12.11 ENCOUNTER FOR SCREENING FOR MALIGNANT NEOPLASM OF COLON: ICD-10-CM

## 2024-06-13 LAB
BASOPHILS # BLD AUTO: 0.05 K/UL
BASOPHILS NFR BLD AUTO: 0.6 %
EOSINOPHIL # BLD AUTO: 0.28 K/UL
EOSINOPHIL NFR BLD AUTO: 3.4 %
HCT VFR BLD CALC: 36 %
HGB BLD-MCNC: 11.8 G/DL
IMM GRANULOCYTES NFR BLD AUTO: 0.2 %
LYMPHOCYTES # BLD AUTO: 1.63 K/UL
LYMPHOCYTES NFR BLD AUTO: 19.7 %
MAN DIFF?: NORMAL
MCHC RBC-ENTMCNC: 28.4 PG
MCHC RBC-ENTMCNC: 32.8 GM/DL
MCV RBC AUTO: 86.5 FL
MONOCYTES # BLD AUTO: 0.68 K/UL
MONOCYTES NFR BLD AUTO: 8.2 %
NEUTROPHILS # BLD AUTO: 5.6 K/UL
NEUTROPHILS NFR BLD AUTO: 67.9 %
PLATELET # BLD AUTO: 248 K/UL
RBC # BLD: 4.16 M/UL
RBC # FLD: 13.2 %
WBC # FLD AUTO: 8.26 K/UL

## 2024-06-18 ENCOUNTER — LABORATORY RESULT (OUTPATIENT)
Age: 69
End: 2024-06-18

## 2024-06-18 ENCOUNTER — NON-APPOINTMENT (OUTPATIENT)
Age: 69
End: 2024-06-18

## 2024-06-21 ENCOUNTER — APPOINTMENT (OUTPATIENT)
Dept: CARDIOLOGY | Facility: CLINIC | Age: 69
End: 2024-06-21
Payer: MEDICARE

## 2024-06-21 PROCEDURE — 93306 TTE W/DOPPLER COMPLETE: CPT

## 2024-07-01 ENCOUNTER — APPOINTMENT (OUTPATIENT)
Dept: RHEUMATOLOGY | Facility: CLINIC | Age: 69
End: 2024-07-01
Payer: MEDICARE

## 2024-07-01 VITALS
HEART RATE: 52 BPM | RESPIRATION RATE: 16 BRPM | SYSTOLIC BLOOD PRESSURE: 134 MMHG | OXYGEN SATURATION: 98 % | DIASTOLIC BLOOD PRESSURE: 82 MMHG | TEMPERATURE: 98 F

## 2024-07-01 VITALS
OXYGEN SATURATION: 97 % | DIASTOLIC BLOOD PRESSURE: 71 MMHG | HEART RATE: 57 BPM | TEMPERATURE: 97 F | SYSTOLIC BLOOD PRESSURE: 166 MMHG | RESPIRATION RATE: 18 BRPM

## 2024-07-01 PROCEDURE — 96374 THER/PROPH/DIAG INJ IV PUSH: CPT | Mod: 59

## 2024-08-21 ENCOUNTER — NON-APPOINTMENT (OUTPATIENT)
Age: 69
End: 2024-08-21

## 2024-08-27 RX ORDER — ZOLEDRONIC ACID 5 MG/100ML
5 INJECTION INTRAVENOUS
Refills: 0 | Status: ACTIVE | COMMUNITY

## 2024-08-29 ENCOUNTER — NON-APPOINTMENT (OUTPATIENT)
Age: 69
End: 2024-08-29

## 2024-10-22 ENCOUNTER — RX RENEWAL (OUTPATIENT)
Age: 69
End: 2024-10-22

## 2024-11-13 ENCOUNTER — NON-APPOINTMENT (OUTPATIENT)
Age: 69
End: 2024-11-13

## 2024-12-06 ENCOUNTER — NON-APPOINTMENT (OUTPATIENT)
Age: 69
End: 2024-12-06

## 2024-12-06 ENCOUNTER — APPOINTMENT (OUTPATIENT)
Dept: CARDIOLOGY | Facility: CLINIC | Age: 69
End: 2024-12-06
Payer: MEDICARE

## 2024-12-06 VITALS — BODY MASS INDEX: 30.87 KG/M2 | HEIGHT: 67.5 IN | WEIGHT: 199 LBS

## 2024-12-06 VITALS
RESPIRATION RATE: 14 BRPM | OXYGEN SATURATION: 98 % | DIASTOLIC BLOOD PRESSURE: 76 MMHG | HEART RATE: 55 BPM | SYSTOLIC BLOOD PRESSURE: 120 MMHG | HEIGHT: 67.5 IN | WEIGHT: 199 LBS | BODY MASS INDEX: 30.87 KG/M2

## 2024-12-06 DIAGNOSIS — E78.5 HYPERLIPIDEMIA, UNSPECIFIED: ICD-10-CM

## 2024-12-06 DIAGNOSIS — I10 ESSENTIAL (PRIMARY) HYPERTENSION: ICD-10-CM

## 2024-12-06 DIAGNOSIS — R01.1 CARDIAC MURMUR, UNSPECIFIED: ICD-10-CM

## 2024-12-06 DIAGNOSIS — E66.3 OVERWEIGHT: ICD-10-CM

## 2024-12-06 DIAGNOSIS — G47.33 OBSTRUCTIVE SLEEP APNEA (ADULT) (PEDIATRIC): ICD-10-CM

## 2024-12-06 PROCEDURE — 93000 ELECTROCARDIOGRAM COMPLETE: CPT

## 2024-12-06 PROCEDURE — G2211 COMPLEX E/M VISIT ADD ON: CPT

## 2024-12-06 PROCEDURE — 99214 OFFICE O/P EST MOD 30 MIN: CPT

## 2024-12-09 NOTE — ASU PREOP CHECKLIST - WARM FLUIDS/WARM BLANKETS
Arterial Line:    Date/Time: 12/9/2024 8:18 AM    Staffing  Performed: attending   Authorized by: Trevor Dinh MD PhD    Performed by: Derick Grace    An arterial line was placed. Procedure performed using ultrasound guidance and surface landmarks.in the OR for the following indication(s): continuous blood pressure monitoring and blood sampling needed.    A 20 gauge (size), 12 cm (length), Arrow (type) catheter was placed into the Left brachial artery, secured by Tegaderm,   Seldinger technique used.  Events:  patient tolerated procedure well with no complications.      Additional notes:  SRNA able to get flash x 2, unable to thread.  US then obtained, x 1 attempt brachial with US by MD            
Airway  Date/Time: 12/9/2024 7:43 AM  Urgency: elective    Airway not difficult    Staffing  Performed: KAYLYN   Authorized by: Trevor Dinh MD PhD    Performed by: Derick Grace  Patient location during procedure: OR    Indications and Patient Condition  Indications for airway management: anesthesia and airway protection  Spontaneous Ventilation: absent  Sedation level: deep  Preoxygenated: yes  Patient position: sniffing  Mask difficulty assessment: 1 - vent by mask  Planned trial extubation    Final Airway Details  Final airway type: endotracheal airway      Successful airway: ETT  Cuffed: yes   Successful intubation technique: direct laryngoscopy  Endotracheal tube insertion site: oral  Blade: Matthew  Blade size: #4  ETT size (mm): 7.5  Cormack-Lehane Classification: grade I - full view of glottis  Placement verified by: chest auscultation and capnometry   Measured from: lips  ETT to lips (cm): 24  Number of attempts at approach: 1  Ventilation between attempts: none  Number of other approaches attempted: 0          
Peripheral IV  Date/Time: 12/9/2024 7:15 AM  Inserted by: Derick Grace    Placement  Needle size: 20 G  Laterality: right  Location: hand  Local anesthetic: none  Site prep: alcohol  Technique: anatomical landmarks  Attempts: 1        
Peripheral IV  Date/Time: 12/9/2024 7:41 AM  Inserted by: Trevor Dinh MD PhD    Placement  Needle size: 18 G  Laterality: left  Location: wrist  Local anesthetic: none  Site prep: alcohol  Technique: anatomical landmarks  Attempts: 1        
Peripheral IV  Date/Time: 12/9/2024 8:36 AM  Inserted by: Derick Grace    Placement  Needle size: 18 G  Laterality: left  Location: hand  Local anesthetic: none  Site prep: alcohol  Technique: anatomical landmarks  Attempts: 1        
no

## 2025-01-22 NOTE — ED ADULT TRIAGE NOTE - BSA (M2)
Patient: Ruben Ellis    Procedure Summary       Date: 01/22/25 Room / Location:  [facility] /  [facility]    Anesthesia Start: 1051 Anesthesia Stop: 1145    Procedure: EXAM UNDER ANESTHESIA, HEMORRHOIDECTOMY WITH RECTAL BIOPSY (Rectum) Diagnosis:       Abdominal pain, unspecified abdominal location      (Abdominal pain, unspecified abdominal location [R10.9])    Surgeons: Nicki Ragland MD Provider: Rafy Barnard DO    Anesthesia Type: general ASA Status: 2            Anesthesia Type: general    Vitals  Vitals Value Taken Time   /86 01/22/25 1208   Temp 97.6 °F (36.4 °C) 01/22/25 1148   Pulse 72 01/22/25 1212   Resp 15 01/22/25 1208   SpO2 100 % 01/22/25 1212   Vitals shown include unfiled device data.        Post Anesthesia Care and Evaluation    Patient location during evaluation: bedside  Patient participation: complete - patient participated  Level of consciousness: awake and alert  Pain score: 1  Pain management: adequate    Airway patency: patent  Anesthetic complications: No anesthetic complications  PONV Status: none  Cardiovascular status: acceptable  Respiratory status: acceptable  Hydration status: acceptable     2.11

## 2025-02-07 ENCOUNTER — APPOINTMENT (OUTPATIENT)
Dept: RHEUMATOLOGY | Facility: CLINIC | Age: 70
End: 2025-02-07
Payer: MEDICARE

## 2025-02-07 VITALS
WEIGHT: 190 LBS | SYSTOLIC BLOOD PRESSURE: 140 MMHG | DIASTOLIC BLOOD PRESSURE: 85 MMHG | HEART RATE: 77 BPM | OXYGEN SATURATION: 94 % | BODY MASS INDEX: 29.47 KG/M2 | HEIGHT: 67.5 IN

## 2025-02-07 DIAGNOSIS — M81.0 AGE-RELATED OSTEOPOROSIS W/OUT CURRENT PATHOLOGICAL FRACTURE: ICD-10-CM

## 2025-02-07 PROCEDURE — 99214 OFFICE O/P EST MOD 30 MIN: CPT

## 2025-02-12 ENCOUNTER — NON-APPOINTMENT (OUTPATIENT)
Age: 70
End: 2025-02-12

## 2025-02-12 LAB
25(OH)D3 SERPL-MCNC: 34.6 NG/ML
ALBUMIN SERPL ELPH-MCNC: 4.1 G/DL
ALP BLD-CCNC: 130 U/L
ALT SERPL-CCNC: 16 U/L
ANION GAP SERPL CALC-SCNC: 15 MMOL/L
AST SERPL-CCNC: 20 U/L
BILIRUB SERPL-MCNC: 0.2 MG/DL
BUN SERPL-MCNC: 15 MG/DL
CALCIUM SERPL-MCNC: 10.5 MG/DL
CALCIUM SERPL-MCNC: 10.5 MG/DL
CHLORIDE SERPL-SCNC: 101 MMOL/L
CO2 SERPL-SCNC: 24 MMOL/L
CREAT SERPL-MCNC: 0.76 MG/DL
EGFR: 96 ML/MIN/1.73M2
MAGNESIUM SERPL-MCNC: 2 MG/DL
PARATHYROID HORMONE INTACT: 25 PG/ML
PHOSPHATE SERPL-MCNC: 2.9 MG/DL
POTASSIUM SERPL-SCNC: 4.3 MMOL/L
PROT SERPL-MCNC: 6.8 G/DL
SODIUM SERPL-SCNC: 140 MMOL/L

## 2025-03-10 ENCOUNTER — APPOINTMENT (OUTPATIENT)
Dept: GASTROENTEROLOGY | Facility: CLINIC | Age: 70
End: 2025-03-10
Payer: MEDICARE

## 2025-03-10 VITALS
WEIGHT: 199 LBS | DIASTOLIC BLOOD PRESSURE: 66 MMHG | HEIGHT: 67.5 IN | OXYGEN SATURATION: 94 % | BODY MASS INDEX: 30.87 KG/M2 | SYSTOLIC BLOOD PRESSURE: 102 MMHG | HEART RATE: 72 BPM

## 2025-03-10 DIAGNOSIS — R13.19 OTHER DYSPHAGIA: ICD-10-CM

## 2025-03-10 DIAGNOSIS — I82.409 ACUTE EMBOLISM AND THROMBOSIS OF UNSPECIFIED DEEP VEINS OF UNSPECIFIED LOWER EXTREMITY: ICD-10-CM

## 2025-03-10 DIAGNOSIS — R10.13 EPIGASTRIC PAIN: ICD-10-CM

## 2025-03-10 DIAGNOSIS — M19.079 PRIMARY OSTEOARTHRITIS, UNSPECIFIED ANKLE AND FOOT: ICD-10-CM

## 2025-03-10 DIAGNOSIS — K92.2 GASTROINTESTINAL HEMORRHAGE, UNSPECIFIED: ICD-10-CM

## 2025-03-10 DIAGNOSIS — R09.02 HYPOXEMIA: ICD-10-CM

## 2025-03-10 DIAGNOSIS — R07.89 OTHER CHEST PAIN: ICD-10-CM

## 2025-03-10 DIAGNOSIS — R94.31 ABNORMAL ELECTROCARDIOGRAM [ECG] [EKG]: ICD-10-CM

## 2025-03-10 DIAGNOSIS — K21.9 GASTRO-ESOPHAGEAL REFLUX DISEASE W/OUT ESOPHAGITIS: ICD-10-CM

## 2025-03-10 DIAGNOSIS — R74.8 ABNORMAL LEVELS OF OTHER SERUM ENZYMES: ICD-10-CM

## 2025-03-10 PROCEDURE — 99213 OFFICE O/P EST LOW 20 MIN: CPT

## 2025-05-12 ENCOUNTER — APPOINTMENT (OUTPATIENT)
Dept: GASTROENTEROLOGY | Facility: CLINIC | Age: 70
End: 2025-05-12

## 2025-06-06 ENCOUNTER — NON-APPOINTMENT (OUTPATIENT)
Age: 70
End: 2025-06-06

## 2025-06-06 ENCOUNTER — APPOINTMENT (OUTPATIENT)
Dept: CARDIOLOGY | Facility: CLINIC | Age: 70
End: 2025-06-06
Payer: MEDICARE

## 2025-06-06 VITALS
HEART RATE: 76 BPM | WEIGHT: 204 LBS | BODY MASS INDEX: 32.02 KG/M2 | OXYGEN SATURATION: 96 % | HEIGHT: 67 IN | DIASTOLIC BLOOD PRESSURE: 68 MMHG | SYSTOLIC BLOOD PRESSURE: 108 MMHG

## 2025-06-06 PROCEDURE — 93000 ELECTROCARDIOGRAM COMPLETE: CPT

## 2025-06-06 PROCEDURE — 99214 OFFICE O/P EST MOD 30 MIN: CPT

## 2025-07-11 RX ORDER — ZOLEDRONIC ACID 5 MG/100ML
5 INJECTION INTRAVENOUS
Refills: 0 | Status: ACTIVE | OUTPATIENT
Start: 2025-07-11

## 2025-07-31 ENCOUNTER — APPOINTMENT (OUTPATIENT)
Dept: RHEUMATOLOGY | Facility: CLINIC | Age: 70
End: 2025-07-31
Payer: MEDICARE

## 2025-07-31 VITALS
DIASTOLIC BLOOD PRESSURE: 72 MMHG | OXYGEN SATURATION: 98 % | RESPIRATION RATE: 18 BRPM | HEART RATE: 78 BPM | SYSTOLIC BLOOD PRESSURE: 135 MMHG | TEMPERATURE: 97.7 F

## 2025-07-31 PROCEDURE — 96374 THER/PROPH/DIAG INJ IV PUSH: CPT

## 2025-07-31 RX ORDER — ZOLEDRONIC ACID 5 MG/100ML
5 INJECTION INTRAVENOUS
Qty: 0 | Refills: 0 | Status: COMPLETED
Start: 2025-07-11

## (undated) DEVICE — DRAPE CAMERA ENDOMATE

## (undated) DEVICE — DRAPE SPLIT SHEET 77" X 120"

## (undated) DEVICE — SOL IRR POUR NS 0.9% 500ML

## (undated) DEVICE — VENODYNE/SCD SLEEVE CALF MEDIUM

## (undated) DEVICE — TUBING SUCTION NONCONDUCTIVE 6MM X 12FT

## (undated) DEVICE — DRSG CURITY GAUZE SPONGE 4 X 4" 12-PLY

## (undated) DEVICE — GOWN XL

## (undated) DEVICE — DRAPE 3/4 SHEET 52X76"

## (undated) DEVICE — LABELS BLANK W PEN

## (undated) DEVICE — POSITIONER FOAM HEAD CRADLE (PINK)

## (undated) DEVICE — PACKING GAUZE PLAIN 1"

## (undated) DEVICE — BASIN SET DOUBLE

## (undated) DEVICE — POOLE SUCTION TIP

## (undated) DEVICE — DRAPE INSTRUMENT POUCH 6.75" X 11"

## (undated) DEVICE — SYR ASEPTO

## (undated) DEVICE — SUCTION YANKAUER OPEN TIP NO VENT CURVE

## (undated) DEVICE — VAGINAL PACKING 2"

## (undated) DEVICE — DRAPE MAGNETIC INSTRUMENT MEDIUM

## (undated) DEVICE — DRAPE SURGICAL #1010

## (undated) DEVICE — DRAPE LIGHT HANDLE COVER (GREEN)

## (undated) DEVICE — ELCTR GROUNDING PAD ADULT COVIDIEN